# Patient Record
Sex: FEMALE | Race: WHITE | Employment: FULL TIME | URBAN - NONMETROPOLITAN AREA
[De-identification: names, ages, dates, MRNs, and addresses within clinical notes are randomized per-mention and may not be internally consistent; named-entity substitution may affect disease eponyms.]

---

## 2017-10-20 ENCOUNTER — HOSPITAL ENCOUNTER (OUTPATIENT)
Dept: NUCLEAR MEDICINE | Age: 27
Discharge: HOME | End: 2017-10-20
Payer: COMMERCIAL

## 2017-10-20 VITALS — WEIGHT: 80 LBS

## 2017-10-20 DIAGNOSIS — R10.811 RIGHT UPPER QUADRANT ABDOMINAL TENDERNESS, REBOUND TENDERNESS PRESENCE NOT SPECIFIED: ICD-10-CM

## 2017-10-20 PROCEDURE — A9537 TC99M MEBROFENIN: HCPCS | Performed by: INTERNAL MEDICINE

## 2017-10-20 PROCEDURE — 3430000000 HC RX DIAGNOSTIC RADIOPHARMACEUTICAL: Performed by: INTERNAL MEDICINE

## 2017-10-20 PROCEDURE — 78227 HEPATOBIL SYST IMAGE W/DRUG: CPT

## 2017-10-20 PROCEDURE — 6360000004 HC RX CONTRAST MEDICATION: Performed by: RADIOLOGY

## 2017-10-20 RX ADMIN — Medication 9 MILLICURIE: at 07:04

## 2017-10-20 RX ADMIN — SINCALIDE 0.73 MCG: 5 INJECTION, POWDER, LYOPHILIZED, FOR SOLUTION INTRAVENOUS at 08:00

## 2018-10-11 ENCOUNTER — ANESTHESIA (OUTPATIENT)
Dept: LABOR AND DELIVERY | Age: 28
End: 2018-10-11
Payer: COMMERCIAL

## 2018-10-11 ENCOUNTER — ANESTHESIA EVENT (OUTPATIENT)
Dept: LABOR AND DELIVERY | Age: 28
End: 2018-10-11
Payer: COMMERCIAL

## 2018-10-11 ENCOUNTER — HOSPITAL ENCOUNTER (INPATIENT)
Age: 28
LOS: 3 days | Discharge: HOME | End: 2018-10-14
Attending: OBSTETRICS & GYNECOLOGY | Admitting: OBSTETRICS & GYNECOLOGY
Payer: COMMERCIAL

## 2018-10-11 ENCOUNTER — APPOINTMENT (OUTPATIENT)
Dept: LABOR AND DELIVERY | Age: 28
End: 2018-10-11
Payer: COMMERCIAL

## 2018-10-11 LAB
ABO: NORMAL
AMPHETAMINE+METHAMPHETAMINE URINE SCREEN: NEGATIVE
ANTIBODY SCREEN: NORMAL
BARBITURATE QUANTITATIVE URINE: NEGATIVE
BASOPHILS # BLD: 0.2 %
BASOPHILS ABSOLUTE: 0 THOU/MM3 (ref 0–0.1)
BENZODIAZEPINE QUANTITATIVE URINE: NEGATIVE
CANNABINOID QUANTITATIVE URINE: NEGATIVE
COCAINE METABOLITE QUANTITATIVE URINE: NEGATIVE
EOSINOPHIL # BLD: 0.6 %
EOSINOPHILS ABSOLUTE: 0.1 THOU/MM3 (ref 0–0.4)
ERYTHROCYTE [DISTWIDTH] IN BLOOD BY AUTOMATED COUNT: 12.6 % (ref 11.5–14.5)
ERYTHROCYTE [DISTWIDTH] IN BLOOD BY AUTOMATED COUNT: 38.3 FL (ref 35–45)
HCT VFR BLD CALC: 33.7 % (ref 37–47)
HEMOGLOBIN: 11.2 GM/DL (ref 12–16)
IMMATURE GRANS (ABS): 0.09 THOU/MM3 (ref 0–0.07)
IMMATURE GRANULOCYTES: 0.8 %
LYMPHOCYTES # BLD: 20 %
LYMPHOCYTES ABSOLUTE: 2.3 THOU/MM3 (ref 1–4.8)
MCH RBC QN AUTO: 27.8 PG (ref 26–33)
MCHC RBC AUTO-ENTMCNC: 33.2 GM/DL (ref 32.2–35.5)
MCV RBC AUTO: 83.6 FL (ref 81–99)
MONOCYTES # BLD: 6.7 %
MONOCYTES ABSOLUTE: 0.8 THOU/MM3 (ref 0.4–1.3)
NUCLEATED RED BLOOD CELLS: 0 /100 WBC
OPIATES, URINE: NEGATIVE
OXYCODONE: NEGATIVE
PHENCYCLIDINE QUANTITATIVE URINE: NEGATIVE
PLATELET # BLD: 229 THOU/MM3 (ref 130–400)
PMV BLD AUTO: 10.1 FL (ref 9.4–12.4)
RBC # BLD: 4.03 MILL/MM3 (ref 4.2–5.4)
RH FACTOR: NORMAL
SEG NEUTROPHILS: 71.7 %
SEGMENTED NEUTROPHILS ABSOLUTE COUNT: 8.3 THOU/MM3 (ref 1.8–7.7)
WBC # BLD: 11.6 THOU/MM3 (ref 4.8–10.8)

## 2018-10-11 PROCEDURE — 1220000001 HC SEMI PRIVATE L&D R&B

## 2018-10-11 PROCEDURE — 6360000002 HC RX W HCPCS: Performed by: OBSTETRICS & GYNECOLOGY

## 2018-10-11 PROCEDURE — 86592 SYPHILIS TEST NON-TREP QUAL: CPT

## 2018-10-11 PROCEDURE — 85025 COMPLETE CBC W/AUTO DIFF WBC: CPT

## 2018-10-11 PROCEDURE — 86900 BLOOD TYPING SEROLOGIC ABO: CPT

## 2018-10-11 PROCEDURE — 2500000003 HC RX 250 WO HCPCS: Performed by: ANESTHESIOLOGY

## 2018-10-11 PROCEDURE — 3700000025 ANESTHESIA EPIDURAL BLOCK: Performed by: ANESTHESIOLOGY

## 2018-10-11 PROCEDURE — 36415 COLL VENOUS BLD VENIPUNCTURE: CPT

## 2018-10-11 PROCEDURE — 4A1HXCZ MONITORING OF PRODUCTS OF CONCEPTION, CARDIAC RATE, EXTERNAL APPROACH: ICD-10-PCS | Performed by: OBSTETRICS & GYNECOLOGY

## 2018-10-11 PROCEDURE — 6360000002 HC RX W HCPCS

## 2018-10-11 PROCEDURE — 86901 BLOOD TYPING SEROLOGIC RH(D): CPT

## 2018-10-11 PROCEDURE — 80307 DRUG TEST PRSMV CHEM ANLYZR: CPT

## 2018-10-11 PROCEDURE — 2709999900 HC NON-CHARGEABLE SUPPLY

## 2018-10-11 PROCEDURE — 2580000003 HC RX 258: Performed by: OBSTETRICS & GYNECOLOGY

## 2018-10-11 PROCEDURE — 86850 RBC ANTIBODY SCREEN: CPT

## 2018-10-11 RX ORDER — TERBUTALINE SULFATE 1 MG/ML
0.25 INJECTION, SOLUTION SUBCUTANEOUS ONCE
Status: COMPLETED | OUTPATIENT
Start: 2018-10-11 | End: 2018-10-12

## 2018-10-11 RX ORDER — SODIUM CHLORIDE, SODIUM LACTATE, POTASSIUM CHLORIDE, CALCIUM CHLORIDE 600; 310; 30; 20 MG/100ML; MG/100ML; MG/100ML; MG/100ML
INJECTION, SOLUTION INTRAVENOUS CONTINUOUS
Status: DISCONTINUED | OUTPATIENT
Start: 2018-10-11 | End: 2018-10-11 | Stop reason: SDUPTHER

## 2018-10-11 RX ORDER — DOCUSATE SODIUM 100 MG/1
100 CAPSULE, LIQUID FILLED ORAL 2 TIMES DAILY
Status: DISCONTINUED | OUTPATIENT
Start: 2018-10-11 | End: 2018-10-11 | Stop reason: SDUPTHER

## 2018-10-11 RX ORDER — BUTORPHANOL TARTRATE 1 MG/ML
INJECTION, SOLUTION INTRAMUSCULAR; INTRAVENOUS
Status: DISCONTINUED
Start: 2018-10-11 | End: 2018-10-12

## 2018-10-11 RX ORDER — DIPHENHYDRAMINE HYDROCHLORIDE 50 MG/ML
25 INJECTION INTRAMUSCULAR; INTRAVENOUS EVERY 4 HOURS PRN
Status: DISCONTINUED | OUTPATIENT
Start: 2018-10-11 | End: 2018-10-12

## 2018-10-11 RX ORDER — NALBUPHINE HCL 10 MG/ML
5 AMPUL (ML) INJECTION EVERY 4 HOURS PRN
Status: DISCONTINUED | OUTPATIENT
Start: 2018-10-11 | End: 2018-10-12 | Stop reason: HOSPADM

## 2018-10-11 RX ORDER — DIPHENHYDRAMINE HYDROCHLORIDE 50 MG/ML
25 INJECTION INTRAMUSCULAR; INTRAVENOUS EVERY 4 HOURS PRN
Status: DISCONTINUED | OUTPATIENT
Start: 2018-10-11 | End: 2018-10-11 | Stop reason: SDUPTHER

## 2018-10-11 RX ORDER — TERBUTALINE SULFATE 1 MG/ML
INJECTION, SOLUTION SUBCUTANEOUS
Status: DISCONTINUED
Start: 2018-10-11 | End: 2018-10-12

## 2018-10-11 RX ORDER — ACETAMINOPHEN 325 MG/1
650 TABLET ORAL EVERY 4 HOURS PRN
Status: DISCONTINUED | OUTPATIENT
Start: 2018-10-11 | End: 2018-10-11 | Stop reason: SDUPTHER

## 2018-10-11 RX ORDER — ACETAMINOPHEN 325 MG/1
650 TABLET ORAL EVERY 4 HOURS PRN
Status: DISCONTINUED | OUTPATIENT
Start: 2018-10-11 | End: 2018-10-12 | Stop reason: HOSPADM

## 2018-10-11 RX ORDER — SODIUM CHLORIDE, SODIUM LACTATE, POTASSIUM CHLORIDE, CALCIUM CHLORIDE 600; 310; 30; 20 MG/100ML; MG/100ML; MG/100ML; MG/100ML
INJECTION, SOLUTION INTRAVENOUS CONTINUOUS
Status: DISCONTINUED | OUTPATIENT
Start: 2018-10-11 | End: 2018-10-12

## 2018-10-11 RX ORDER — BUTORPHANOL TARTRATE 1 MG/ML
1 INJECTION, SOLUTION INTRAMUSCULAR; INTRAVENOUS
Status: DISCONTINUED | OUTPATIENT
Start: 2018-10-11 | End: 2018-10-12

## 2018-10-11 RX ORDER — ONDANSETRON 2 MG/ML
4 INJECTION INTRAMUSCULAR; INTRAVENOUS EVERY 6 HOURS PRN
Status: DISCONTINUED | OUTPATIENT
Start: 2018-10-11 | End: 2018-10-12 | Stop reason: HOSPADM

## 2018-10-11 RX ORDER — METHYLERGONOVINE MALEATE 0.2 MG/ML
200 INJECTION INTRAVENOUS PRN
Status: DISCONTINUED | OUTPATIENT
Start: 2018-10-11 | End: 2018-10-12

## 2018-10-11 RX ORDER — NALOXONE HYDROCHLORIDE 0.4 MG/ML
0.4 INJECTION, SOLUTION INTRAMUSCULAR; INTRAVENOUS; SUBCUTANEOUS PRN
Status: DISCONTINUED | OUTPATIENT
Start: 2018-10-11 | End: 2018-10-12 | Stop reason: HOSPADM

## 2018-10-11 RX ORDER — DOCUSATE SODIUM 100 MG/1
100 CAPSULE, LIQUID FILLED ORAL 2 TIMES DAILY
Status: DISCONTINUED | OUTPATIENT
Start: 2018-10-11 | End: 2018-10-12 | Stop reason: HOSPADM

## 2018-10-11 RX ORDER — ONDANSETRON 2 MG/ML
4 INJECTION INTRAMUSCULAR; INTRAVENOUS EVERY 6 HOURS PRN
Status: DISCONTINUED | OUTPATIENT
Start: 2018-10-11 | End: 2018-10-11 | Stop reason: SDUPTHER

## 2018-10-11 RX ORDER — METHYLERGONOVINE MALEATE 0.2 MG/ML
200 INJECTION INTRAVENOUS PRN
Status: DISCONTINUED | OUTPATIENT
Start: 2018-10-11 | End: 2018-10-11 | Stop reason: SDUPTHER

## 2018-10-11 RX ORDER — CARBOPROST TROMETHAMINE 250 UG/ML
250 INJECTION, SOLUTION INTRAMUSCULAR PRN
Status: DISCONTINUED | OUTPATIENT
Start: 2018-10-11 | End: 2018-10-11 | Stop reason: SDUPTHER

## 2018-10-11 RX ORDER — CARBOPROST TROMETHAMINE 250 UG/ML
250 INJECTION, SOLUTION INTRAMUSCULAR PRN
Status: DISCONTINUED | OUTPATIENT
Start: 2018-10-11 | End: 2018-10-12

## 2018-10-11 RX ADMIN — SODIUM CHLORIDE, POTASSIUM CHLORIDE, SODIUM LACTATE AND CALCIUM CHLORIDE: 600; 310; 30; 20 INJECTION, SOLUTION INTRAVENOUS at 18:23

## 2018-10-11 RX ADMIN — SODIUM CHLORIDE, POTASSIUM CHLORIDE, SODIUM LACTATE AND CALCIUM CHLORIDE: 600; 310; 30; 20 INJECTION, SOLUTION INTRAVENOUS at 21:10

## 2018-10-11 RX ADMIN — Medication 15 ML/HR: at 22:15

## 2018-10-11 RX ADMIN — TERBUTALINE SULFATE 0.25 MG: 1 INJECTION SUBCUTANEOUS at 19:01

## 2018-10-11 RX ADMIN — BUTORPHANOL TARTRATE 1 MG: 1 INJECTION, SOLUTION INTRAMUSCULAR; INTRAVENOUS at 18:24

## 2018-10-11 RX ADMIN — SODIUM CHLORIDE, POTASSIUM CHLORIDE, SODIUM LACTATE AND CALCIUM CHLORIDE: 600; 310; 30; 20 INJECTION, SOLUTION INTRAVENOUS at 16:24

## 2018-10-11 ASSESSMENT — PAIN SCALES - GENERAL: PAINLEVEL_OUTOF10: 7

## 2018-10-11 NOTE — FLOWSHEET NOTE
Dr anthony Robles phoned in to dept.  Is viewing strip and has ordered 0.25 mg of brethine subcue now and hold off on the pitocin until further orders

## 2018-10-11 NOTE — PLAN OF CARE
Problem: Anxiety:  Goal: Level of anxiety will decrease  Level of anxiety will decrease  Outcome: Ongoing  Pt anxiety level will remain low with support from staff and     Problem: Breathing Pattern - Ineffective:  Goal: Able to breathe comfortably  Able to breathe comfortably  Outcome: Ongoing  Pt will continue to have easy and unlabored respirs    Problem: Fluid Volume - Imbalance:  Goal: Absence of intrapartum hemorrhage signs and symptoms  Absence of intrapartum hemorrhage signs and symptoms  Outcome: Ongoing  Pt will continue to have absence of intrapartum heorrhage s/s    Problem: Infection - Intrapartum Infection:  Goal: Will show no infection signs and symptoms  Will show no infection signs and symptoms  Outcome: Ongoing  Pt will continue to remain afebrile    Problem: Labor Process - Prolonged:  Goal: Uterine contractions within specified parameters  Uterine contractions within specified parameters  Outcome: Ongoing  Pt will continue to have uterine ctx within normal limits    Problem:  Screening:  Goal: Ability to make informed decisions regarding treatment has improved  Ability to make informed decisions regarding treatment has improved  Outcome: Ongoing  Pt will continue to have the ability to make informed decisions regarding her tx    Problem: Pain - Acute:  Goal: Able to cope with pain  Able to cope with pain  Outcome: Ongoing  Pt is planning on an epidural for labor discomfort    Problem: Tissue Perfusion - Uteroplacental, Altered:  Goal: Absence of abnormal fetal heart rate pattern  Absence of abnormal fetal heart rate pattern  Outcome: Ongoing  FHT's will continue with mod variability and spontaneous accels    Problem: Urinary Retention:  Goal: Experiences of bladder distention will decrease  Experiences of bladder distention will decrease  Outcome: Ongoing  Pt willcontinue to void qs  Goal: Urinary elimination within specified parameters  Urinary elimination within specified

## 2018-10-12 VITALS
DIASTOLIC BLOOD PRESSURE: 59 MMHG | RESPIRATION RATE: 1 BRPM | OXYGEN SATURATION: 99 % | SYSTOLIC BLOOD PRESSURE: 106 MMHG | TEMPERATURE: 96.8 F

## 2018-10-12 LAB — RPR: NONREACTIVE

## 2018-10-12 PROCEDURE — 3609079900 HC CESAREAN SECTION: Performed by: OBSTETRICS & GYNECOLOGY

## 2018-10-12 PROCEDURE — 2580000003 HC RX 258: Performed by: OBSTETRICS & GYNECOLOGY

## 2018-10-12 PROCEDURE — 6360000002 HC RX W HCPCS: Performed by: ANESTHESIOLOGY

## 2018-10-12 PROCEDURE — 2500000003 HC RX 250 WO HCPCS: Performed by: ANESTHESIOLOGY

## 2018-10-12 PROCEDURE — 7100000000 HC PACU RECOVERY - FIRST 15 MIN: Performed by: OBSTETRICS & GYNECOLOGY

## 2018-10-12 PROCEDURE — 2500000003 HC RX 250 WO HCPCS

## 2018-10-12 PROCEDURE — 3E033VJ INTRODUCTION OF OTHER HORMONE INTO PERIPHERAL VEIN, PERCUTANEOUS APPROACH: ICD-10-PCS | Performed by: OBSTETRICS & GYNECOLOGY

## 2018-10-12 PROCEDURE — 2709999900 HC NON-CHARGEABLE SUPPLY

## 2018-10-12 PROCEDURE — 2580000003 HC RX 258: Performed by: ANESTHESIOLOGY

## 2018-10-12 PROCEDURE — 10907ZC DRAINAGE OF AMNIOTIC FLUID, THERAPEUTIC FROM PRODUCTS OF CONCEPTION, VIA NATURAL OR ARTIFICIAL OPENING: ICD-10-PCS | Performed by: OBSTETRICS & GYNECOLOGY

## 2018-10-12 PROCEDURE — 7100000001 HC PACU RECOVERY - ADDTL 15 MIN: Performed by: OBSTETRICS & GYNECOLOGY

## 2018-10-12 PROCEDURE — 6360000002 HC RX W HCPCS: Performed by: OBSTETRICS & GYNECOLOGY

## 2018-10-12 PROCEDURE — 6370000000 HC RX 637 (ALT 250 FOR IP): Performed by: OBSTETRICS & GYNECOLOGY

## 2018-10-12 PROCEDURE — 2580000003 HC RX 258

## 2018-10-12 PROCEDURE — S0028 INJECTION, FAMOTIDINE, 20 MG: HCPCS

## 2018-10-12 PROCEDURE — 2500000003 HC RX 250 WO HCPCS: Performed by: OBSTETRICS & GYNECOLOGY

## 2018-10-12 PROCEDURE — 6360000002 HC RX W HCPCS

## 2018-10-12 PROCEDURE — 1200000000 HC SEMI PRIVATE

## 2018-10-12 RX ORDER — DOCUSATE SODIUM 100 MG/1
100 CAPSULE, LIQUID FILLED ORAL 2 TIMES DAILY
Status: DISCONTINUED | OUTPATIENT
Start: 2018-10-12 | End: 2018-10-14 | Stop reason: HOSPADM

## 2018-10-12 RX ORDER — ZOLPIDEM TARTRATE 10 MG/1
10 TABLET ORAL NIGHTLY PRN
Status: DISCONTINUED | OUTPATIENT
Start: 2018-10-13 | End: 2018-10-14 | Stop reason: HOSPADM

## 2018-10-12 RX ORDER — MISOPROSTOL 200 UG/1
800 TABLET ORAL PRN
Status: DISCONTINUED | OUTPATIENT
Start: 2018-10-12 | End: 2018-10-14 | Stop reason: HOSPADM

## 2018-10-12 RX ORDER — NALOXONE HYDROCHLORIDE 0.4 MG/ML
0.4 INJECTION, SOLUTION INTRAMUSCULAR; INTRAVENOUS; SUBCUTANEOUS PRN
Status: ACTIVE | OUTPATIENT
Start: 2018-10-12 | End: 2018-10-13

## 2018-10-12 RX ORDER — CLINDAMYCIN PHOSPHATE 900 MG/50ML
900 INJECTION INTRAVENOUS
Status: COMPLETED | OUTPATIENT
Start: 2018-10-12 | End: 2018-10-12

## 2018-10-12 RX ORDER — CLINDAMYCIN PHOSPHATE 600 MG/50ML
600 INJECTION INTRAVENOUS EVERY 8 HOURS
Status: DISCONTINUED | OUTPATIENT
Start: 2018-10-12 | End: 2018-10-13

## 2018-10-12 RX ORDER — DIPHENHYDRAMINE HYDROCHLORIDE 50 MG/ML
25 INJECTION INTRAMUSCULAR; INTRAVENOUS EVERY 6 HOURS PRN
Status: DISCONTINUED | OUTPATIENT
Start: 2018-10-13 | End: 2018-10-14 | Stop reason: HOSPADM

## 2018-10-12 RX ORDER — OXYCODONE HYDROCHLORIDE 5 MG/1
5 TABLET ORAL EVERY 6 HOURS PRN
Qty: 28 TABLET | Refills: 0 | Status: SHIPPED | OUTPATIENT
Start: 2018-10-12 | End: 2018-10-19

## 2018-10-12 RX ORDER — BISACODYL 10 MG
10 SUPPOSITORY, RECTAL RECTAL DAILY PRN
Status: DISCONTINUED | OUTPATIENT
Start: 2018-10-12 | End: 2018-10-14 | Stop reason: HOSPADM

## 2018-10-12 RX ORDER — KETOROLAC TROMETHAMINE 30 MG/ML
INJECTION, SOLUTION INTRAMUSCULAR; INTRAVENOUS PRN
Status: DISCONTINUED | OUTPATIENT
Start: 2018-10-12 | End: 2018-10-12 | Stop reason: SDUPTHER

## 2018-10-12 RX ORDER — OXYCODONE HYDROCHLORIDE AND ACETAMINOPHEN 5; 325 MG/1; MG/1
1 TABLET ORAL EVERY 4 HOURS PRN
Status: DISCONTINUED | OUTPATIENT
Start: 2018-10-13 | End: 2018-10-14 | Stop reason: HOSPADM

## 2018-10-12 RX ORDER — OXYCODONE HYDROCHLORIDE 5 MG/1
5 TABLET ORAL EVERY 4 HOURS PRN
Status: DISPENSED | OUTPATIENT
Start: 2018-10-12 | End: 2018-10-13

## 2018-10-12 RX ORDER — ONDANSETRON 2 MG/ML
4 INJECTION INTRAMUSCULAR; INTRAVENOUS EVERY 6 HOURS PRN
Status: ACTIVE | OUTPATIENT
Start: 2018-10-12 | End: 2018-10-13

## 2018-10-12 RX ORDER — ACETAMINOPHEN 325 MG/1
650 TABLET ORAL EVERY 4 HOURS PRN
Status: DISCONTINUED | OUTPATIENT
Start: 2018-10-13 | End: 2018-10-14 | Stop reason: HOSPADM

## 2018-10-12 RX ORDER — MORPHINE SULFATE 10 MG/ML
INJECTION, SOLUTION INTRAMUSCULAR; INTRAVENOUS PRN
Status: DISCONTINUED | OUTPATIENT
Start: 2018-10-12 | End: 2018-10-12 | Stop reason: SDUPTHER

## 2018-10-12 RX ORDER — TRISODIUM CITRATE DIHYDRATE AND CITRIC ACID MONOHYDRATE 500; 334 MG/5ML; MG/5ML
15 SOLUTION ORAL ONCE
Status: DISCONTINUED | OUTPATIENT
Start: 2018-10-12 | End: 2018-10-12 | Stop reason: HOSPADM

## 2018-10-12 RX ORDER — CLINDAMYCIN PHOSPHATE 900 MG/50ML
INJECTION INTRAVENOUS
Status: COMPLETED
Start: 2018-10-12 | End: 2018-10-12

## 2018-10-12 RX ORDER — KETOROLAC TROMETHAMINE 30 MG/ML
15 INJECTION, SOLUTION INTRAMUSCULAR; INTRAVENOUS EVERY 6 HOURS
Status: COMPLETED | OUTPATIENT
Start: 2018-10-12 | End: 2018-10-13

## 2018-10-12 RX ORDER — DIPHENHYDRAMINE HCL 25 MG
25 TABLET ORAL EVERY 4 HOURS PRN
Status: DISCONTINUED | OUTPATIENT
Start: 2018-10-13 | End: 2018-10-14 | Stop reason: HOSPADM

## 2018-10-12 RX ORDER — PRENATAL WITH FERROUS FUM AND FOLIC ACID 3080; 920; 120; 400; 22; 1.84; 3; 20; 10; 1; 12; 200; 27; 25; 2 [IU]/1; [IU]/1; MG/1; [IU]/1; MG/1; MG/1; MG/1; MG/1; MG/1; MG/1; UG/1; MG/1; MG/1; MG/1; MG/1
1 TABLET ORAL DAILY
Status: DISCONTINUED | OUTPATIENT
Start: 2018-10-12 | End: 2018-10-14 | Stop reason: HOSPADM

## 2018-10-12 RX ORDER — ONDANSETRON 2 MG/ML
4 INJECTION INTRAMUSCULAR; INTRAVENOUS EVERY 6 HOURS PRN
Status: DISCONTINUED | OUTPATIENT
Start: 2018-10-12 | End: 2018-10-12 | Stop reason: HOSPADM

## 2018-10-12 RX ORDER — ONDANSETRON 4 MG/1
8 TABLET, FILM COATED ORAL EVERY 8 HOURS PRN
Status: DISCONTINUED | OUTPATIENT
Start: 2018-10-13 | End: 2018-10-14 | Stop reason: HOSPADM

## 2018-10-12 RX ORDER — ACETAMINOPHEN 325 MG/1
325 TABLET ORAL EVERY 4 HOURS PRN
Status: ACTIVE | OUTPATIENT
Start: 2018-10-12 | End: 2018-10-13

## 2018-10-12 RX ORDER — KETOROLAC TROMETHAMINE 30 MG/ML
30 INJECTION, SOLUTION INTRAMUSCULAR; INTRAVENOUS EVERY 6 HOURS
Status: DISPENSED | OUTPATIENT
Start: 2018-10-13 | End: 2018-10-14

## 2018-10-12 RX ORDER — DEXTROSE MONOHYDRATE 50 MG/ML
INJECTION, SOLUTION INTRAVENOUS
Status: DISCONTINUED
Start: 2018-10-12 | End: 2018-10-12

## 2018-10-12 RX ORDER — MORPHINE SULFATE 4 MG/ML
4 INJECTION, SOLUTION INTRAMUSCULAR; INTRAVENOUS
Status: DISCONTINUED | OUTPATIENT
Start: 2018-10-13 | End: 2018-10-14 | Stop reason: HOSPADM

## 2018-10-12 RX ORDER — FENTANYL CITRATE 50 UG/ML
INJECTION, SOLUTION INTRAMUSCULAR; INTRAVENOUS PRN
Status: DISCONTINUED | OUTPATIENT
Start: 2018-10-12 | End: 2018-10-12 | Stop reason: SDUPTHER

## 2018-10-12 RX ORDER — OXYCODONE HYDROCHLORIDE 5 MG/1
10 TABLET ORAL EVERY 4 HOURS PRN
Status: ACTIVE | OUTPATIENT
Start: 2018-10-12 | End: 2018-10-13

## 2018-10-12 RX ORDER — SODIUM CHLORIDE 0.9 % (FLUSH) 0.9 %
10 SYRINGE (ML) INJECTION PRN
Status: DISCONTINUED | OUTPATIENT
Start: 2018-10-12 | End: 2018-10-14 | Stop reason: HOSPADM

## 2018-10-12 RX ORDER — SODIUM CHLORIDE, SODIUM LACTATE, POTASSIUM CHLORIDE, CALCIUM CHLORIDE 600; 310; 30; 20 MG/100ML; MG/100ML; MG/100ML; MG/100ML
INJECTION, SOLUTION INTRAVENOUS CONTINUOUS PRN
Status: DISCONTINUED | OUTPATIENT
Start: 2018-10-12 | End: 2018-10-12 | Stop reason: SDUPTHER

## 2018-10-12 RX ORDER — SODIUM CHLORIDE, SODIUM LACTATE, POTASSIUM CHLORIDE, AND CALCIUM CHLORIDE .6; .31; .03; .02 G/100ML; G/100ML; G/100ML; G/100ML
1000 INJECTION, SOLUTION INTRAVENOUS ONCE
Status: DISCONTINUED | OUTPATIENT
Start: 2018-10-12 | End: 2018-10-12 | Stop reason: HOSPADM

## 2018-10-12 RX ORDER — FERROUS SULFATE 325(65) MG
325 TABLET ORAL
Status: DISCONTINUED | OUTPATIENT
Start: 2018-10-13 | End: 2018-10-14 | Stop reason: HOSPADM

## 2018-10-12 RX ORDER — TERBUTALINE SULFATE 1 MG/ML
INJECTION, SOLUTION SUBCUTANEOUS
Status: COMPLETED
Start: 2018-10-12 | End: 2018-10-12

## 2018-10-12 RX ORDER — DIPHENHYDRAMINE HYDROCHLORIDE 50 MG/ML
25 INJECTION INTRAMUSCULAR; INTRAVENOUS EVERY 6 HOURS PRN
Status: ACTIVE | OUTPATIENT
Start: 2018-10-12 | End: 2018-10-13

## 2018-10-12 RX ORDER — SIMETHICONE 80 MG
80 TABLET,CHEWABLE ORAL EVERY 6 HOURS PRN
Status: DISCONTINUED | OUTPATIENT
Start: 2018-10-12 | End: 2018-10-14 | Stop reason: HOSPADM

## 2018-10-12 RX ORDER — TRISODIUM CITRATE DIHYDRATE AND CITRIC ACID MONOHYDRATE 500; 334 MG/5ML; MG/5ML
SOLUTION ORAL
Status: DISCONTINUED
Start: 2018-10-12 | End: 2018-10-12 | Stop reason: WASHOUT

## 2018-10-12 RX ORDER — MORPHINE SULFATE 2 MG/ML
2 INJECTION, SOLUTION INTRAMUSCULAR; INTRAVENOUS
Status: DISCONTINUED | OUTPATIENT
Start: 2018-10-13 | End: 2018-10-14 | Stop reason: HOSPADM

## 2018-10-12 RX ORDER — LANOLIN 100 %
OINTMENT (GRAM) TOPICAL
Status: DISCONTINUED | OUTPATIENT
Start: 2018-10-12 | End: 2018-10-14 | Stop reason: HOSPADM

## 2018-10-12 RX ORDER — SODIUM CHLORIDE 0.9 % (FLUSH) 0.9 %
10 SYRINGE (ML) INJECTION EVERY 12 HOURS SCHEDULED
Status: DISCONTINUED | OUTPATIENT
Start: 2018-10-12 | End: 2018-10-14 | Stop reason: HOSPADM

## 2018-10-12 RX ORDER — LIDOCAINE HYDROCHLORIDE 20 MG/ML
INJECTION, SOLUTION INFILTRATION; PERINEURAL PRN
Status: DISCONTINUED | OUTPATIENT
Start: 2018-10-12 | End: 2018-10-12 | Stop reason: SDUPTHER

## 2018-10-12 RX ORDER — TERBUTALINE SULFATE 1 MG/ML
0.25 INJECTION, SOLUTION SUBCUTANEOUS ONCE
Status: DISCONTINUED | OUTPATIENT
Start: 2018-10-12 | End: 2018-10-12

## 2018-10-12 RX ORDER — ONDANSETRON 2 MG/ML
4 INJECTION INTRAMUSCULAR; INTRAVENOUS EVERY 6 HOURS PRN
Status: DISCONTINUED | OUTPATIENT
Start: 2018-10-13 | End: 2018-10-14 | Stop reason: HOSPADM

## 2018-10-12 RX ORDER — OXYTOCIN 10 [USP'U]/ML
INJECTION, SOLUTION INTRAMUSCULAR; INTRAVENOUS PRN
Status: DISCONTINUED | OUTPATIENT
Start: 2018-10-12 | End: 2018-10-12 | Stop reason: SDUPTHER

## 2018-10-12 RX ORDER — CARBOPROST TROMETHAMINE 250 UG/ML
250 INJECTION, SOLUTION INTRAMUSCULAR PRN
Status: DISCONTINUED | OUTPATIENT
Start: 2018-10-12 | End: 2018-10-14 | Stop reason: HOSPADM

## 2018-10-12 RX ORDER — AZITHROMYCIN 500 MG/1
INJECTION, POWDER, LYOPHILIZED, FOR SOLUTION INTRAVENOUS
Status: DISCONTINUED
Start: 2018-10-12 | End: 2018-10-12

## 2018-10-12 RX ORDER — OXYCODONE HYDROCHLORIDE AND ACETAMINOPHEN 5; 325 MG/1; MG/1
2 TABLET ORAL EVERY 4 HOURS PRN
Status: DISCONTINUED | OUTPATIENT
Start: 2018-10-13 | End: 2018-10-14 | Stop reason: HOSPADM

## 2018-10-12 RX ORDER — METHYLERGONOVINE MALEATE 0.2 MG/ML
200 INJECTION INTRAVENOUS PRN
Status: DISCONTINUED | OUTPATIENT
Start: 2018-10-12 | End: 2018-10-14 | Stop reason: HOSPADM

## 2018-10-12 RX ORDER — IBUPROFEN 800 MG/1
800 TABLET ORAL EVERY 8 HOURS
Status: DISCONTINUED | OUTPATIENT
Start: 2018-10-14 | End: 2018-10-13

## 2018-10-12 RX ORDER — SODIUM CHLORIDE, SODIUM LACTATE, POTASSIUM CHLORIDE, CALCIUM CHLORIDE 600; 310; 30; 20 MG/100ML; MG/100ML; MG/100ML; MG/100ML
INJECTION, SOLUTION INTRAVENOUS CONTINUOUS
Status: DISCONTINUED | OUTPATIENT
Start: 2018-10-12 | End: 2018-10-14 | Stop reason: HOSPADM

## 2018-10-12 RX ADMIN — SODIUM CHLORIDE, POTASSIUM CHLORIDE, SODIUM LACTATE AND CALCIUM CHLORIDE: 600; 310; 30; 20 INJECTION, SOLUTION INTRAVENOUS at 02:55

## 2018-10-12 RX ADMIN — CLINDAMYCIN PHOSPHATE 600 MG: 600 INJECTION, SOLUTION INTRAVENOUS at 14:03

## 2018-10-12 RX ADMIN — ACETAMINOPHEN 650 MG: 325 TABLET ORAL at 01:07

## 2018-10-12 RX ADMIN — LIDOCAINE HYDROCHLORIDE 20 ML: 20 INJECTION, SOLUTION INFILTRATION; PERINEURAL at 05:40

## 2018-10-12 RX ADMIN — Medication 1 MILLI-UNITS/MIN: at 03:13

## 2018-10-12 RX ADMIN — FENTANYL CITRATE 100 MCG: 50 INJECTION INTRAMUSCULAR; INTRAVENOUS at 05:40

## 2018-10-12 RX ADMIN — KETOROLAC TROMETHAMINE 15 MG: 30 INJECTION, SOLUTION INTRAMUSCULAR at 19:15

## 2018-10-12 RX ADMIN — Medication 50 MILLI-UNITS/MIN: at 06:54

## 2018-10-12 RX ADMIN — AZITHROMYCIN MONOHYDRATE 500 MG: 500 INJECTION, POWDER, LYOPHILIZED, FOR SOLUTION INTRAVENOUS at 05:57

## 2018-10-12 RX ADMIN — CLINDAMYCIN PHOSPHATE 900 MG: 900 INJECTION, SOLUTION INTRAVENOUS at 05:27

## 2018-10-12 RX ADMIN — PRENATAL WITH FERROUS FUM AND FOLIC ACID 1 TABLET: 3080; 920; 120; 400; 22; 1.84; 3; 20; 10; 1; 12; 200; 27; 25; 2 TABLET ORAL at 14:43

## 2018-10-12 RX ADMIN — CLINDAMYCIN PHOSPHATE 900 MG: 900 INJECTION INTRAVENOUS at 05:27

## 2018-10-12 RX ADMIN — GENTAMICIN SULFATE 267.6 MG: 40 INJECTION, SOLUTION INTRAMUSCULAR; INTRAVENOUS at 06:08

## 2018-10-12 RX ADMIN — SODIUM CHLORIDE, POTASSIUM CHLORIDE, SODIUM LACTATE AND CALCIUM CHLORIDE: 600; 310; 30; 20 INJECTION, SOLUTION INTRAVENOUS at 15:06

## 2018-10-12 RX ADMIN — OXYTOCIN 10 UNITS: 10 INJECTION, SOLUTION INTRAMUSCULAR; INTRAVENOUS at 06:05

## 2018-10-12 RX ADMIN — DOCUSATE SODIUM 100 MG: 100 CAPSULE, LIQUID FILLED ORAL at 20:55

## 2018-10-12 RX ADMIN — SODIUM CHLORIDE, POTASSIUM CHLORIDE, SODIUM LACTATE AND CALCIUM CHLORIDE: 600; 310; 30; 20 INJECTION, SOLUTION INTRAVENOUS at 05:38

## 2018-10-12 RX ADMIN — KETOROLAC TROMETHAMINE 30 MG: 60 INJECTION, SOLUTION INTRAMUSCULAR at 06:38

## 2018-10-12 RX ADMIN — TERBUTALINE SULFATE 0.25 MG: 1 INJECTION SUBCUTANEOUS at 00:33

## 2018-10-12 RX ADMIN — FAMOTIDINE 20 MG: 10 INJECTION, SOLUTION INTRAVENOUS at 05:28

## 2018-10-12 RX ADMIN — SODIUM CHLORIDE, POTASSIUM CHLORIDE, SODIUM LACTATE AND CALCIUM CHLORIDE: 600; 310; 30; 20 INJECTION, SOLUTION INTRAVENOUS at 22:00

## 2018-10-12 RX ADMIN — KETOROLAC TROMETHAMINE 30 MG: 30 INJECTION, SOLUTION INTRAMUSCULAR; INTRAVENOUS at 06:36

## 2018-10-12 RX ADMIN — OXYTOCIN 20 UNITS: 10 INJECTION, SOLUTION INTRAMUSCULAR; INTRAVENOUS at 06:35

## 2018-10-12 RX ADMIN — CLINDAMYCIN PHOSPHATE 600 MG: 600 INJECTION, SOLUTION INTRAVENOUS at 20:55

## 2018-10-12 RX ADMIN — MORPHINE SULFATE 2 MG: 10 INJECTION, SOLUTION INTRAMUSCULAR; INTRAVENOUS at 05:40

## 2018-10-12 RX ADMIN — SODIUM BICARBONATE 2 MEQ: 84 INJECTION, SOLUTION INTRAVENOUS at 05:40

## 2018-10-12 RX ADMIN — Medication 80 MG: at 20:55

## 2018-10-12 RX ADMIN — DOCUSATE SODIUM 100 MG: 100 CAPSULE, LIQUID FILLED ORAL at 14:43

## 2018-10-12 RX ADMIN — KETOROLAC TROMETHAMINE 15 MG: 30 INJECTION, SOLUTION INTRAMUSCULAR at 13:07

## 2018-10-12 ASSESSMENT — PULMONARY FUNCTION TESTS
PIF_VALUE: 0
PIF_VALUE: 1
PIF_VALUE: 0

## 2018-10-12 ASSESSMENT — PAIN SCALES - GENERAL
PAINLEVEL_OUTOF10: 5
PAINLEVEL_OUTOF10: 3
PAINLEVEL_OUTOF10: 0
PAINLEVEL_OUTOF10: 3

## 2018-10-12 NOTE — PLAN OF CARE
Problem: Fluid Volume - Imbalance:  Goal: Absence of postpartum hemorrhage signs and symptoms  Absence of postpartum hemorrhage signs and symptoms   Outcome: Ongoing  Vaginal bleeding WNL, no clots or foul odors. Problem: Pain - Acute:  Goal: Pain level will decrease  Pain level will decrease   Outcome: Ongoing  Scheduled toradol given with relief. Problem: Urinary Retention:  Goal: Urinary elimination within specified parameters  Urinary elimination within specified parameters   Outcome: Ongoing  Nieto catheter draining clear yellow urine. Problem: Discharge Planning:  Goal: Discharged to appropriate level of care  Discharged to appropriate level of care   Outcome: Ongoing  Plan to be discharged home with significant other     Problem: Infection - Surgical Site:  Goal: Will show no infection signs and symptoms  Will show no infection signs and symptoms   Outcome: Ongoing  Afebrile this shift. Problem: Mood - Altered:  Goal: Mood stable  Mood stable   Outcome: Ongoing  Emotional support provided. Problem: Nausea/Vomiting:  Goal: Absence of nausea/vomiting  Absence of nausea/vomiting   Outcome: Ongoing  No nausea noted. Problem: Venous Thromboembolism:  Goal: Will show no signs or symptoms of venous thromboembolism  Will show no signs or symptoms of venous thromboembolism   Outcome: Ongoing  Afebrile this shift. Comments: Care plan reviewed with patient and she contributes to goal setting and voices understanding of plan of care.

## 2018-10-12 NOTE — FLOWSHEET NOTE
Updated Dr. Elvis Johnson on patient status. Baseline remains at 140 with moderate variability and accelerations. Patient is having mild ctx every 6-9 min. SVE at 0130 was 5,80%-1. RN will start pitocin and continue with poc.

## 2018-10-12 NOTE — FLOWSHEET NOTE
Dr. Nabeel Bills notified of fetal heart tones and patient ctx. Patient is ctx every min. RN repositioned patient and when the patient was repositioned the heart tones dropped and the patient had a prolonged deceleration. The heart tones are trying to come back up to baseline. Interventions have been performed. RN would like Dr. Nabeel Bills to come to the unit. She is on her way and orders received.

## 2018-10-12 NOTE — L&D DELIVERY SUMMARY NOTE
Department of Obstetrics and Gynecology   Section Note    Pt Name: Sejal Woodson  MRN: [id number] [place] #: [de-identified]  YOB: 1990  Procedure Performed By: Hao Hernandez MD    Indications: non-reassuring fetal status fetal bradycardia x 3 and no progress    Pre-operative Diagnosis: 40 week pregnancy. Post-operative Diagnosis:  Same, Delivered, Living newbornMale  Procedure:  primary low transverse  section  Findings:  Normal tubes, ovaries and uterus. Baby Male, Apgars  8,9 and weight of 8 lbs and 2 ounces. Estimated Blood Loss:  700ml         Specimens: None     Complications:  CAN x 1         Condition: infant stable to general nursery and mother stable    Indications:     Sejal Woodson is a 34 y.o. female  at 44w3d who presented for   section for  failure to progress - arrest of dilation and non-reassuring fetal status. She understood the  risks and benefits and signed informed consent. Procedure: The patient was taken to the Operating Room where spinal anesthesia was placed. She was placed in the dorsal supine position with a leftward tilt and prepped and draped. A Pfannenstiel incision was made with the scalpel taken down to the fascia. The fascia was nicked in the midline. This incision extended laterally. The underlying rectus muscle dissected bluntly and with the Mitchell scissors. The peritoneum identified and entered sharply. This incision extended superiorly and inferior with good visualization of the bladder. The vesicouterine peritoneum was identified and entered sharply. This incision extended laterally and the bladder flap created digitally. The uterus was incised in a low transverse fashion and extended digitally. The vertex was removed from the uterus with the aid of vacuum assistance and fundal pressure. The nose and mouth were suctioned. The rest of the baby delivered. The cord was clamped and cut and the baby was stimulated.

## 2018-10-12 NOTE — FLOWSHEET NOTE
Patient dangled on side of bed and stood at bedside. Returned to bed ice pack to incision area. Tolerated well, call light in reach denies any needs at this time.

## 2018-10-12 NOTE — FLOWSHEET NOTE
Dr. Constantino Hopkins notified that baby is not having any accelerations. She did receive stadol at [phone]. RN will acoustic stim and check her cervix and do scalp stim.

## 2018-10-13 LAB — HEMOGLOBIN: 9.3 GM/DL (ref 12–16)

## 2018-10-13 PROCEDURE — 6370000000 HC RX 637 (ALT 250 FOR IP): Performed by: ANESTHESIOLOGY

## 2018-10-13 PROCEDURE — 2500000003 HC RX 250 WO HCPCS: Performed by: OBSTETRICS & GYNECOLOGY

## 2018-10-13 PROCEDURE — 6360000002 HC RX W HCPCS: Performed by: ANESTHESIOLOGY

## 2018-10-13 PROCEDURE — 85018 HEMOGLOBIN: CPT

## 2018-10-13 PROCEDURE — 6370000000 HC RX 637 (ALT 250 FOR IP): Performed by: OBSTETRICS & GYNECOLOGY

## 2018-10-13 PROCEDURE — 1200000000 HC SEMI PRIVATE

## 2018-10-13 PROCEDURE — 6360000002 HC RX W HCPCS: Performed by: OBSTETRICS & GYNECOLOGY

## 2018-10-13 PROCEDURE — 2709999900 HC NON-CHARGEABLE SUPPLY

## 2018-10-13 PROCEDURE — 36415 COLL VENOUS BLD VENIPUNCTURE: CPT

## 2018-10-13 RX ADMIN — KETOROLAC TROMETHAMINE 30 MG: 30 INJECTION, SOLUTION INTRAMUSCULAR at 08:10

## 2018-10-13 RX ADMIN — CLINDAMYCIN PHOSPHATE 600 MG: 600 INJECTION, SOLUTION INTRAVENOUS at 04:35

## 2018-10-13 RX ADMIN — OXYCODONE HYDROCHLORIDE AND ACETAMINOPHEN 1 TABLET: 5; 325 TABLET ORAL at 23:47

## 2018-10-13 RX ADMIN — OXYCODONE HYDROCHLORIDE 5 MG: 5 TABLET ORAL at 04:30

## 2018-10-13 RX ADMIN — OXYCODONE HYDROCHLORIDE AND ACETAMINOPHEN 1 TABLET: 5; 325 TABLET ORAL at 13:26

## 2018-10-13 RX ADMIN — IBUPROFEN 800 MG: 200 SUSPENSION ORAL at 15:18

## 2018-10-13 RX ADMIN — DOCUSATE SODIUM 100 MG: 100 CAPSULE, LIQUID FILLED ORAL at 19:43

## 2018-10-13 RX ADMIN — KETOROLAC TROMETHAMINE 15 MG: 30 INJECTION, SOLUTION INTRAMUSCULAR at 01:05

## 2018-10-13 RX ADMIN — DOCUSATE SODIUM 100 MG: 100 CAPSULE, LIQUID FILLED ORAL at 08:10

## 2018-10-13 ASSESSMENT — PAIN SCALES - GENERAL
PAINLEVEL_OUTOF10: 1
PAINLEVEL_OUTOF10: 5
PAINLEVEL_OUTOF10: 2
PAINLEVEL_OUTOF10: 4
PAINLEVEL_OUTOF10: 3
PAINLEVEL_OUTOF10: 5

## 2018-10-14 VITALS
TEMPERATURE: 97.8 F | HEIGHT: 59 IN | BODY MASS INDEX: 23.79 KG/M2 | HEART RATE: 99 BPM | SYSTOLIC BLOOD PRESSURE: 113 MMHG | WEIGHT: 118 LBS | DIASTOLIC BLOOD PRESSURE: 73 MMHG | OXYGEN SATURATION: 100 % | RESPIRATION RATE: 18 BRPM

## 2018-10-14 LAB
BASOPHILS # BLD: 0.1 %
BASOPHILS ABSOLUTE: 0 THOU/MM3 (ref 0–0.1)
EOSINOPHIL # BLD: 1.4 %
EOSINOPHILS ABSOLUTE: 0.1 THOU/MM3 (ref 0–0.4)
ERYTHROCYTE [DISTWIDTH] IN BLOOD BY AUTOMATED COUNT: 13.2 % (ref 11.5–14.5)
ERYTHROCYTE [DISTWIDTH] IN BLOOD BY AUTOMATED COUNT: 41.4 FL (ref 35–45)
HCT VFR BLD CALC: 29.5 % (ref 37–47)
HEMOGLOBIN: 9.3 GM/DL (ref 12–16)
IMMATURE GRANS (ABS): 0.1 THOU/MM3 (ref 0–0.07)
IMMATURE GRANULOCYTES: 0.9 %
LYMPHOCYTES # BLD: 19.8 %
LYMPHOCYTES ABSOLUTE: 2.1 THOU/MM3 (ref 1–4.8)
MCH RBC QN AUTO: 27.4 PG (ref 26–33)
MCHC RBC AUTO-ENTMCNC: 31.5 GM/DL (ref 32.2–35.5)
MCV RBC AUTO: 87 FL (ref 81–99)
MONOCYTES # BLD: 6.8 %
MONOCYTES ABSOLUTE: 0.7 THOU/MM3 (ref 0.4–1.3)
NUCLEATED RED BLOOD CELLS: 0 /100 WBC
PLATELET # BLD: 206 THOU/MM3 (ref 130–400)
PMV BLD AUTO: 9.9 FL (ref 9.4–12.4)
RBC # BLD: 3.39 MILL/MM3 (ref 4.2–5.4)
SEG NEUTROPHILS: 71 %
SEGMENTED NEUTROPHILS ABSOLUTE COUNT: 7.5 THOU/MM3 (ref 1.8–7.7)
WBC # BLD: 10.5 THOU/MM3 (ref 4.8–10.8)

## 2018-10-14 PROCEDURE — 36415 COLL VENOUS BLD VENIPUNCTURE: CPT

## 2018-10-14 PROCEDURE — 6370000000 HC RX 637 (ALT 250 FOR IP): Performed by: OBSTETRICS & GYNECOLOGY

## 2018-10-14 PROCEDURE — 85025 COMPLETE CBC W/AUTO DIFF WBC: CPT

## 2018-10-14 RX ORDER — IBUPROFEN 800 MG/1
800 TABLET ORAL EVERY 8 HOURS PRN
Qty: 40 TABLET | Refills: 0 | Status: ON HOLD | OUTPATIENT
Start: 2018-10-14 | End: 2021-07-03 | Stop reason: HOSPADM

## 2018-10-14 RX ORDER — OXYCODONE HYDROCHLORIDE AND ACETAMINOPHEN 5; 325 MG/1; MG/1
1 TABLET ORAL EVERY 6 HOURS PRN
Qty: 28 TABLET | Refills: 0 | Status: SHIPPED | OUTPATIENT
Start: 2018-10-14 | End: 2018-10-21

## 2018-10-14 RX ADMIN — OXYCODONE HYDROCHLORIDE AND ACETAMINOPHEN 1 TABLET: 5; 325 TABLET ORAL at 10:47

## 2018-10-14 RX ADMIN — IBUPROFEN 800 MG: 200 SUSPENSION ORAL at 05:43

## 2018-10-14 RX ADMIN — DOCUSATE SODIUM 100 MG: 100 CAPSULE, LIQUID FILLED ORAL at 10:47

## 2018-10-14 ASSESSMENT — PAIN SCALES - GENERAL
PAINLEVEL_OUTOF10: 4
PAINLEVEL_OUTOF10: 5

## 2018-10-14 NOTE — FLOWSHEET NOTE
Postpartum education brochure given, teaching complete. [place] postpartum depression screening discussed with patient, instructed to contact her healthcare provider if her score is > 10. Patient voiced understanding. Mother's blood type is AB+. Rhogam is not indicated.

## 2021-06-29 ENCOUNTER — APPOINTMENT (OUTPATIENT)
Dept: CT IMAGING | Age: 31
DRG: 387 | End: 2021-06-29
Payer: COMMERCIAL

## 2021-06-29 ENCOUNTER — HOSPITAL ENCOUNTER (INPATIENT)
Age: 31
LOS: 5 days | Discharge: HOME | DRG: 387 | End: 2021-07-04
Attending: FAMILY MEDICINE | Admitting: FAMILY MEDICINE
Payer: COMMERCIAL

## 2021-06-29 DIAGNOSIS — K50.10 CROHN'S DISEASE OF COLON WITHOUT COMPLICATION (HCC): Primary | ICD-10-CM

## 2021-06-29 PROBLEM — R10.9 ABDOMINAL PAIN: Status: ACTIVE | Noted: 2021-06-29

## 2021-06-29 LAB
ALBUMIN SERPL-MCNC: 3.6 G/DL (ref 3.5–5.1)
ALP BLD-CCNC: 160 U/L (ref 38–126)
ALT SERPL-CCNC: 12 U/L (ref 11–66)
ANION GAP SERPL CALCULATED.3IONS-SCNC: 16 MEQ/L (ref 8–16)
AST SERPL-CCNC: 11 U/L (ref 5–40)
BACTERIA: ABNORMAL /HPF
BASOPHILS # BLD: 0.2 %
BASOPHILS ABSOLUTE: 0 THOU/MM3 (ref 0–0.1)
BILIRUB SERPL-MCNC: 0.9 MG/DL (ref 0.3–1.2)
BILIRUBIN URINE: ABNORMAL
BLOOD, URINE: ABNORMAL
BUN BLDV-MCNC: 5 MG/DL (ref 7–22)
C-REACTIVE PROTEIN: 8.72 MG/DL (ref 0–1)
CALCIUM SERPL-MCNC: 9.1 MG/DL (ref 8.5–10.5)
CASTS 2: ABNORMAL /LPF
CASTS UA: ABNORMAL /LPF
CHARACTER, URINE: CLEAR
CHLORIDE BLD-SCNC: 100 MEQ/L (ref 98–111)
CO2: 21 MEQ/L (ref 23–33)
COLOR: ABNORMAL
CREAT SERPL-MCNC: 0.7 MG/DL (ref 0.4–1.2)
CRYSTALS, UA: ABNORMAL
EOSINOPHIL # BLD: 0 %
EOSINOPHILS ABSOLUTE: 0 THOU/MM3 (ref 0–0.4)
EPITHELIAL CELLS, UA: ABNORMAL /HPF
ERYTHROCYTE [DISTWIDTH] IN BLOOD BY AUTOMATED COUNT: 14.4 % (ref 11.5–14.5)
ERYTHROCYTE [DISTWIDTH] IN BLOOD BY AUTOMATED COUNT: 42 FL (ref 35–45)
GFR SERPL CREATININE-BSD FRML MDRD: > 90 ML/MIN/1.73M2
GLUCOSE BLD-MCNC: 112 MG/DL (ref 70–108)
GLUCOSE URINE: NEGATIVE MG/DL
HCT VFR BLD CALC: 37.5 % (ref 37–47)
HEMOGLOBIN: 11.8 GM/DL (ref 12–16)
ICTOTEST: NEGATIVE
IMMATURE GRANS (ABS): 0.07 THOU/MM3 (ref 0–0.07)
IMMATURE GRANULOCYTES: 0.5 %
KETONES, URINE: 80
LACTIC ACID, SEPSIS: 0.8 MMOL/L (ref 0.5–1.9)
LEUKOCYTE ESTERASE, URINE: NEGATIVE
LIPASE: 23.2 U/L (ref 5.6–51.3)
LYMPHOCYTES # BLD: 5.5 %
LYMPHOCYTES ABSOLUTE: 0.7 THOU/MM3 (ref 1–4.8)
MCH RBC QN AUTO: 25.6 PG (ref 26–33)
MCHC RBC AUTO-ENTMCNC: 31.5 GM/DL (ref 32.2–35.5)
MCV RBC AUTO: 81.3 FL (ref 81–99)
MISCELLANEOUS 2: ABNORMAL
MONOCYTES # BLD: 3.8 %
MONOCYTES ABSOLUTE: 0.5 THOU/MM3 (ref 0.4–1.3)
NITRITE, URINE: NEGATIVE
NUCLEATED RED BLOOD CELLS: 0 /100 WBC
OSMOLALITY CALCULATION: 271.8 MOSMOL/KG (ref 275–300)
PH UA: 5.5 (ref 5–9)
PLATELET # BLD: 414 THOU/MM3 (ref 130–400)
PMV BLD AUTO: 8.7 FL (ref 9.4–12.4)
POTASSIUM REFLEX MAGNESIUM: 3.7 MEQ/L (ref 3.5–5.2)
PREGNANCY, SERUM: NEGATIVE
PROCALCITONIN: < 0.02 NG/ML (ref 0.01–0.09)
PROTEIN UA: 30
RBC # BLD: 4.61 MILL/MM3 (ref 4.2–5.4)
RBC URINE: ABNORMAL /HPF
RENAL EPITHELIAL, UA: ABNORMAL
SEG NEUTROPHILS: 90 %
SEGMENTED NEUTROPHILS ABSOLUTE COUNT: 11.5 THOU/MM3 (ref 1.8–7.7)
SODIUM BLD-SCNC: 137 MEQ/L (ref 135–145)
SPECIFIC GRAVITY, URINE: 1.03 (ref 1–1.03)
TOTAL PROTEIN: 7.3 G/DL (ref 6.1–8)
UROBILINOGEN, URINE: 1 EU/DL (ref 0–1)
WBC # BLD: 12.8 THOU/MM3 (ref 4.8–10.8)
WBC UA: ABNORMAL /HPF
YEAST: ABNORMAL

## 2021-06-29 PROCEDURE — 99284 EMERGENCY DEPT VISIT MOD MDM: CPT

## 2021-06-29 PROCEDURE — 80053 COMPREHEN METABOLIC PANEL: CPT

## 2021-06-29 PROCEDURE — 99222 1ST HOSP IP/OBS MODERATE 55: CPT | Performed by: FAMILY MEDICINE

## 2021-06-29 PROCEDURE — 2580000003 HC RX 258: Performed by: PHYSICIAN ASSISTANT

## 2021-06-29 PROCEDURE — 96375 TX/PRO/DX INJ NEW DRUG ADDON: CPT

## 2021-06-29 PROCEDURE — 1200000003 HC TELEMETRY R&B

## 2021-06-29 PROCEDURE — 36415 COLL VENOUS BLD VENIPUNCTURE: CPT

## 2021-06-29 PROCEDURE — 83605 ASSAY OF LACTIC ACID: CPT

## 2021-06-29 PROCEDURE — 84145 PROCALCITONIN (PCT): CPT

## 2021-06-29 PROCEDURE — 6360000004 HC RX CONTRAST MEDICATION: Performed by: PHYSICIAN ASSISTANT

## 2021-06-29 PROCEDURE — 6360000002 HC RX W HCPCS: Performed by: PHYSICIAN ASSISTANT

## 2021-06-29 PROCEDURE — 2580000003 HC RX 258: Performed by: FAMILY MEDICINE

## 2021-06-29 PROCEDURE — 86140 C-REACTIVE PROTEIN: CPT

## 2021-06-29 PROCEDURE — 83690 ASSAY OF LIPASE: CPT

## 2021-06-29 PROCEDURE — 81001 URINALYSIS AUTO W/SCOPE: CPT

## 2021-06-29 PROCEDURE — 74177 CT ABD & PELVIS W/CONTRAST: CPT

## 2021-06-29 PROCEDURE — 6360000002 HC RX W HCPCS: Performed by: FAMILY MEDICINE

## 2021-06-29 PROCEDURE — 6370000000 HC RX 637 (ALT 250 FOR IP): Performed by: FAMILY MEDICINE

## 2021-06-29 PROCEDURE — 96374 THER/PROPH/DIAG INJ IV PUSH: CPT

## 2021-06-29 PROCEDURE — 84703 CHORIONIC GONADOTROPIN ASSAY: CPT

## 2021-06-29 PROCEDURE — 85025 COMPLETE CBC W/AUTO DIFF WBC: CPT

## 2021-06-29 PROCEDURE — 96376 TX/PRO/DX INJ SAME DRUG ADON: CPT

## 2021-06-29 RX ORDER — METHYLPREDNISOLONE SODIUM SUCCINATE 125 MG/2ML
125 INJECTION, POWDER, LYOPHILIZED, FOR SOLUTION INTRAMUSCULAR; INTRAVENOUS ONCE
Status: COMPLETED | OUTPATIENT
Start: 2021-06-29 | End: 2021-06-29

## 2021-06-29 RX ORDER — PREDNISONE 20 MG/1
40 TABLET ORAL DAILY
Status: DISCONTINUED | OUTPATIENT
Start: 2021-06-29 | End: 2021-07-01

## 2021-06-29 RX ORDER — ONDANSETRON 2 MG/ML
4 INJECTION INTRAMUSCULAR; INTRAVENOUS ONCE
Status: COMPLETED | OUTPATIENT
Start: 2021-06-29 | End: 2021-06-29

## 2021-06-29 RX ORDER — ONDANSETRON 4 MG/1
4 TABLET, ORALLY DISINTEGRATING ORAL EVERY 8 HOURS PRN
Status: DISCONTINUED | OUTPATIENT
Start: 2021-06-29 | End: 2021-07-04 | Stop reason: HOSPADM

## 2021-06-29 RX ORDER — METRONIDAZOLE 500 MG/1
500 TABLET ORAL EVERY 8 HOURS SCHEDULED
Status: DISCONTINUED | OUTPATIENT
Start: 2021-06-29 | End: 2021-07-01

## 2021-06-29 RX ORDER — FENTANYL CITRATE 50 UG/ML
75 INJECTION, SOLUTION INTRAMUSCULAR; INTRAVENOUS ONCE
Status: COMPLETED | OUTPATIENT
Start: 2021-06-29 | End: 2021-06-29

## 2021-06-29 RX ORDER — SODIUM CHLORIDE 9 MG/ML
25 INJECTION, SOLUTION INTRAVENOUS PRN
Status: DISCONTINUED | OUTPATIENT
Start: 2021-06-29 | End: 2021-07-04 | Stop reason: HOSPADM

## 2021-06-29 RX ORDER — SODIUM CHLORIDE 9 MG/ML
INJECTION, SOLUTION INTRAVENOUS CONTINUOUS
Status: DISCONTINUED | OUTPATIENT
Start: 2021-06-30 | End: 2021-07-04 | Stop reason: HOSPADM

## 2021-06-29 RX ORDER — KETOROLAC TROMETHAMINE 30 MG/ML
30 INJECTION, SOLUTION INTRAMUSCULAR; INTRAVENOUS ONCE
Status: COMPLETED | OUTPATIENT
Start: 2021-06-29 | End: 2021-06-29

## 2021-06-29 RX ORDER — SODIUM CHLORIDE 0.9 % (FLUSH) 0.9 %
5-40 SYRINGE (ML) INJECTION PRN
Status: DISCONTINUED | OUTPATIENT
Start: 2021-06-29 | End: 2021-07-04 | Stop reason: HOSPADM

## 2021-06-29 RX ORDER — MORPHINE SULFATE 2 MG/ML
2 INJECTION, SOLUTION INTRAMUSCULAR; INTRAVENOUS EVERY 4 HOURS PRN
Status: DISCONTINUED | OUTPATIENT
Start: 2021-06-29 | End: 2021-06-30

## 2021-06-29 RX ORDER — CIPROFLOXACIN 2 MG/ML
400 INJECTION, SOLUTION INTRAVENOUS EVERY 12 HOURS
Status: DISCONTINUED | OUTPATIENT
Start: 2021-06-29 | End: 2021-07-01

## 2021-06-29 RX ORDER — ACETAMINOPHEN 325 MG/1
650 TABLET ORAL EVERY 6 HOURS PRN
Status: DISCONTINUED | OUTPATIENT
Start: 2021-06-29 | End: 2021-07-04 | Stop reason: HOSPADM

## 2021-06-29 RX ORDER — POLYETHYLENE GLYCOL 3350 17 G/17G
17 POWDER, FOR SOLUTION ORAL DAILY PRN
Status: DISCONTINUED | OUTPATIENT
Start: 2021-06-29 | End: 2021-07-04 | Stop reason: HOSPADM

## 2021-06-29 RX ORDER — ACETAMINOPHEN 650 MG/1
650 SUPPOSITORY RECTAL EVERY 6 HOURS PRN
Status: DISCONTINUED | OUTPATIENT
Start: 2021-06-29 | End: 2021-07-04 | Stop reason: HOSPADM

## 2021-06-29 RX ORDER — 0.9 % SODIUM CHLORIDE 0.9 %
1000 INTRAVENOUS SOLUTION INTRAVENOUS ONCE
Status: COMPLETED | OUTPATIENT
Start: 2021-06-29 | End: 2021-06-29

## 2021-06-29 RX ORDER — SODIUM CHLORIDE 0.9 % (FLUSH) 0.9 %
5-40 SYRINGE (ML) INJECTION EVERY 12 HOURS SCHEDULED
Status: DISCONTINUED | OUTPATIENT
Start: 2021-06-29 | End: 2021-07-04 | Stop reason: HOSPADM

## 2021-06-29 RX ORDER — ONDANSETRON 2 MG/ML
4 INJECTION INTRAMUSCULAR; INTRAVENOUS EVERY 6 HOURS PRN
Status: DISCONTINUED | OUTPATIENT
Start: 2021-06-29 | End: 2021-07-04 | Stop reason: HOSPADM

## 2021-06-29 RX ADMIN — IOHEXOL 50 ML: 240 INJECTION, SOLUTION INTRATHECAL; INTRAVASCULAR; INTRAVENOUS; ORAL at 16:05

## 2021-06-29 RX ADMIN — CIPROFLOXACIN 400 MG: 2 INJECTION, SOLUTION INTRAVENOUS at 21:26

## 2021-06-29 RX ADMIN — SODIUM CHLORIDE 1000 ML: 9 INJECTION, SOLUTION INTRAVENOUS at 16:55

## 2021-06-29 RX ADMIN — SODIUM CHLORIDE 1000 ML: 9 INJECTION, SOLUTION INTRAVENOUS at 14:52

## 2021-06-29 RX ADMIN — MORPHINE SULFATE 2 MG: 2 INJECTION, SOLUTION INTRAMUSCULAR; INTRAVENOUS at 19:35

## 2021-06-29 RX ADMIN — IOPAMIDOL 80 ML: 755 INJECTION, SOLUTION INTRAVENOUS at 15:55

## 2021-06-29 RX ADMIN — FENTANYL CITRATE 75 MCG: 50 INJECTION, SOLUTION INTRAMUSCULAR; INTRAVENOUS at 16:28

## 2021-06-29 RX ADMIN — KETOROLAC TROMETHAMINE 30 MG: 30 INJECTION, SOLUTION INTRAMUSCULAR; INTRAVENOUS at 16:29

## 2021-06-29 RX ADMIN — FENTANYL CITRATE 75 MCG: 50 INJECTION, SOLUTION INTRAMUSCULAR; INTRAVENOUS at 14:52

## 2021-06-29 RX ADMIN — PREDNISONE 40 MG: 20 TABLET ORAL at 21:26

## 2021-06-29 RX ADMIN — SODIUM CHLORIDE 25 ML: 9 INJECTION, SOLUTION INTRAVENOUS at 18:26

## 2021-06-29 RX ADMIN — ONDANSETRON 4 MG: 2 INJECTION INTRAMUSCULAR; INTRAVENOUS at 14:52

## 2021-06-29 RX ADMIN — METHYLPREDNISOLONE SODIUM SUCCINATE 125 MG: 125 INJECTION, POWDER, FOR SOLUTION INTRAMUSCULAR; INTRAVENOUS at 16:55

## 2021-06-29 RX ADMIN — METRONIDAZOLE 500 MG: 500 TABLET ORAL at 21:27

## 2021-06-29 ASSESSMENT — ENCOUNTER SYMPTOMS
VOMITING: 1
ABDOMINAL PAIN: 1
NAUSEA: 1

## 2021-06-29 ASSESSMENT — PAIN SCALES - GENERAL
PAINLEVEL_OUTOF10: 7
PAINLEVEL_OUTOF10: 3
PAINLEVEL_OUTOF10: 9
PAINLEVEL_OUTOF10: 9
PAINLEVEL_OUTOF10: 7
PAINLEVEL_OUTOF10: 10
PAINLEVEL_OUTOF10: 9

## 2021-06-29 ASSESSMENT — PAIN DESCRIPTION - ORIENTATION
ORIENTATION: RIGHT;MID;LOWER
ORIENTATION: RIGHT;LOWER

## 2021-06-29 ASSESSMENT — PAIN DESCRIPTION - LOCATION
LOCATION: ABDOMEN
LOCATION: ABDOMEN

## 2021-06-29 ASSESSMENT — PAIN DESCRIPTION - FREQUENCY
FREQUENCY: CONTINUOUS
FREQUENCY: CONTINUOUS

## 2021-06-29 ASSESSMENT — PAIN DESCRIPTION - DESCRIPTORS
DESCRIPTORS: ACHING
DESCRIPTORS: SHARP

## 2021-06-29 ASSESSMENT — PAIN DESCRIPTION - PAIN TYPE
TYPE: ACUTE PAIN
TYPE: ACUTE PAIN

## 2021-06-29 NOTE — ED TRIAGE NOTES
Pt comes to the ED via lobby with LLQ pain. Pt states she has a hx of crohns. Pt was recently seen at [facility] and CT states she may have inflammation around her spleen. Pt states she may feel constipatied and hasn't eaten much and has been losing weight.

## 2021-06-29 NOTE — LETTER
[facility]  [address] [facility] [address]  Phone: [phone]    No name on file. July 4, 2021     Patient: Leia Lee   YOB: 1990   Date of Visit: 6/29/2021       To Whom It May Concern: It is my medical opinion that Carolyn Madera may return to work on 07/12/2021. If you have any questions or concerns, please don't hesitate to call. Sincerely,  Verbally Dr. Jena Bach.    Electronically signed by Wing San RN on 7/4/2021 at 3:17 PM

## 2021-06-29 NOTE — ED NOTES
Patient transferred to [facility] room 020 Nurse informed.       Zakiya Toure  06/29/21 1724

## 2021-06-29 NOTE — LETTER
[facility]  [address] [facility] [address]  Phone: [phone]             June 30, 2021    Patient: Saúl Linder   YOB: 1990   Date of Visit: 6/29/2021       To Whom It May Concern:    Kailash Jha was seen and treated in our facility  Beginning on 6/29/2021 through current.        Sincerely,       Dr Ada Chowdhury  /  Elba Herron RN        Signature:__________________________________

## 2021-06-29 NOTE — ED NOTES
ED to inpatient nurses report    Chief Complaint   Patient presents with    Abdominal Pain      Present to ED from home  LOC: alert and orientated to name, place, date  Vital signs   Vitals:    06/29/21 1424 06/29/21 1525 06/29/21 1645   BP: 108/66 95/62 99/63   Pulse: 117 98 110   Resp: 16 16 18   Temp: 98.3 °F (36.8 °C)     TempSrc: Oral     SpO2: 100% 100% 100%   Weight: 85 lb (38.6 kg)     Height: 4' 11\" (1.499 m)        Oxygen Baseline RA    Current needs required RA Bipap/Cpap No  LDAs:   Peripheral IV 06/29/21 Right Antecubital (Active)   Site Assessment Clean;Dry; Intact 06/29/21 1646   Line Status Specimen collected 06/29/21 1452   Dressing Status Clean;Dry; Intact 06/29/21 1452   Dressing Intervention New 06/29/21 1452     Mobility: Independent  Pending ED orders: None  Present condition: Stable    Electronically signed by Osorio Silva RN on 6/29/2021 at 5:07 PM       Osorio Silva RN  06/29/21 6079

## 2021-06-29 NOTE — ED PROVIDER NOTES
[facility]  eMERGENCY dEPARTMENT eNCOUnter          CHIEF COMPLAINT       Chief Complaint   Patient presents with    Abdominal Pain       Nurses Notes reviewed and I agree except as noted inthe HPI. HISTORY OF PRESENT ILLNESS    Luh Mcclure is a 31 y.o. female who presents to the Emergency Department for the evaluation of abdominal pain. Patient reports a history of Crohn's disease. She was previously on Humira but due to loss of insurance has been off of this medication for a while. She was given a trial of medications after recent visit at [place] [address] and states she just got approved through [facility] for reinitiation of the medication on , is scheduled to start this . She states she has been having some issues with generalized abdominal pain over the past 2 weeks. Around 9 AM this morning she had a bowel movement and noticed severe worsening of pain which is not typical of her prior Crohn's flares. She has had associated nausea and vomiting over the past 2 days as well as decreased oral intake and appetite. She has not tried any treatment at home. Denies any associated fever, constipation, diarrhea, urinary changes or blood in her emesis/stool. The HPI was provided by the patient. REVIEW OF SYSTEMS     Review of Systems   Gastrointestinal: Positive for abdominal pain, nausea and vomiting. All other systems reviewed and are negative. PAST MEDICAL HISTORY    has a past medical history of Crohn's colitis ([facility] [address] SURGICAL HISTORY      has a past surgical history that includes [place] tooth extraction and pr  delivery only (N/A, 10/12/2018).     CURRENT MEDICATIONS       Previous Medications    ADALIMUMAB (HUMIRA) 40 MG/0.8ML INJECTION    Inject 40 mg into the skin once    IBUPROFEN (IBU) 800 MG TABLET    Take 1 tablet by mouth every 8 hours as needed for Pain    PRENATAL VIT-FE FUMARATE-FA (PRENATAL 19 PO)    Take by mouth       ALLERGIES     is allergic to duricef [cefadroxil] and penicillins. FAMILY HISTORY     She indicated that the status of her mother is unknown.   family history includes Other in her mother. SOCIAL HISTORY      reports that she has never smoked. She has never used smokeless tobacco. She reports that she does not drink alcohol and does not use drugs. PHYSICAL EXAM     INITIAL VITALS:  height is 4' 11\" (1.499 m) and weight is 85 lb (38.6 kg). Her oral temperature is 98.3 °F (36.8 °C). Her blood pressure is 99/63 and her pulse is 110. Her respiration is 18 and oxygen saturation is 100%. Physical Exam  Vitals and nursing note reviewed. Constitutional:       Comments: Appears uncomfortable, occasional grimacing and clutching the abdomen in pain   HENT:      Head: Normocephalic and atraumatic. Cardiovascular:      Rate and Rhythm: Normal rate. Pulmonary:      Effort: Pulmonary effort is normal. No respiratory distress. Abdominal:      Tenderness: There is generalized abdominal tenderness. There is guarding. Skin:     General: Skin is warm and dry. Neurological:      General: No focal deficit present. Mental Status: She is alert. Psychiatric:         Mood and Affect: Mood normal.         DIFFERENTIAL DIAGNOSIS:   Differential diagnoses are discussed    DIAGNOSTIC RESULTS     EKG: All EKG's are interpreted by the Emergency Department Physician who either signs or Co-signsthis chart in the absence of a cardiologist.          RADIOLOGY: non-plain film images(s) such as CT, Ultrasound and MRI are read by the radiologist.    CT ABDOMEN PELVIS W IV CONTRAST Additional Contrast? Oral   Final Result   Markedly thickened edematous distal ileum including the terminal ileum highly suggestive of active inflammatory bowel disease. **This report has been created using voice recognition software. It may contain minor errors which are inherent in voice recognition technology. ** Final report electronically signed by Dr. Beto Whitaker on 6/29/2021 4:16 PM          LABS:      Labs Reviewed   CBC WITH AUTO DIFFERENTIAL - Abnormal; Notable for the following components:       Result Value    WBC 12.8 (*)     Hemoglobin 11.8 (*)     MCH 25.6 (*)     MCHC 31.5 (*)     Platelets 862 (*)     MPV 8.7 (*)     Segs Absolute 11.5 (*)     Lymphocytes Absolute 0.7 (*)     All other components within normal limits   COMPREHENSIVE METABOLIC PANEL W/ REFLEX TO MG FOR LOW K - Abnormal; Notable for the following components:    Glucose 112 (*)     BUN 5 (*)     CO2 21 (*)     Alkaline Phosphatase 160 (*)     All other components within normal limits   C-REACTIVE PROTEIN - Abnormal; Notable for the following components:    CRP 8.72 (*)     All other components within normal limits   URINE WITH REFLEXED MICRO - Abnormal; Notable for the following components:    Bilirubin Urine MODERATE (*)     Ketones, Urine 80 (*)     Blood, Urine LARGE (*)     Protein, UA 30 (*)     Color, UA DK YELLOW (*)     All other components within normal limits   OSMOLALITY - Abnormal; Notable for the following components:    Osmolality Calc 271.8 (*)     All other components within normal limits   LIPASE   HCG, SERUM, QUALITATIVE   LACTATE, SEPSIS   PROCALCITONIN   BILE ACIDS, TOTAL   ANION GAP   GLOMERULAR FILTRATION RATE, ESTIMATED       EMERGENCY DEPARTMENT COURSE:   Vitals:    Vitals:    06/29/21 1424 06/29/21 1525 06/29/21 1645   BP: 108/66 95/62 99/63   Pulse: 117 98 110   Resp: 16 16 18   Temp: 98.3 °F (36.8 °C)     TempSrc: Oral     SpO2: 100% 100% 100%   Weight: 85 lb (38.6 kg)     Height: 4' 11\" (1.499 m)        3:05 PM EDT: The patient was seen and evaluated. Patient presents tachycardic for complaint of abdominal pain. States it does not feel consistent with prior Crohn's disease flares and she is concerned it could be her gallbladder or appendix.   She has generalized tenderness with guarding on exam, appears uncomfortable. She was given IV fluids with fentanyl and Zofran. She did have some borderline hypotension but improvement in heart rate though she denied any improvement in her pain. Laboratory results revealed 12,800 white blood cell count with hemoglobin 11.8. CRP elevated at 8.72 with lactic acid and procalcitonin within normal limits. CT of the abdomen shows markedly thickened edematous distal ileum including the terminal ileum, highly suggestive of active inflammatory bowel disease. Results were discussed with the patient. Due to the persistence of her severe pain as well as rebound tachycardia, discussed potential admission and she was agreeable with this. Discussed with attending provider who is agreeable as well. Will initiate treatment with Solu-Medrol. I did reach out to Dr. Saniya Morales, GI on call for Dr. Carolin Tinajero. No return call after 45 minutes. Discussed with the hospitalist service will admit for further care. CRITICAL CARE:   None    CONSULTS:  Hospitalist    PROCEDURES:  None    FINAL IMPRESSION      1. Crohn's disease of colon without complication ([facility] [address]          DISPOSITION/PLAN   Admit    PATIENT REFERRED TO:  No follow-up provider specified.     DISCHARGEMEDICATIONS:  New Prescriptions    No medications on file       (Please note that portions of this note were completedwith a voice recognition program.  Efforts were made to edit the dictations but occasionally words are mis-transcribed.)        Dany Walker PA-C  06/29/21 6421

## 2021-06-29 NOTE — H&P
History & Physical        Patient:  Leia Lee  YOB: 1990    MRN: [id number]     Acct: [de-identified]    PCP: Wellington Shukla MD    Date of Admission: 2021    Date of Service: Pt seen/examined on 21    Chief Complaint:    Abdominal pain    History Of Present Illness:    31 y.o. female, with a hx of Crohn's disease, who presented to [address] [place] [address] with generalized abdominal pain, intermittent for the past 2 weeks, non radiating, not related to food intake and accompanied by night sweats and chills. This morning, pain has gotten more severe, around 9-10/10, accompanied nausea but not vomiting. She denies any fever, nausea, vomiting, diarrhea, constipation, hematochezia or melena. ]    She has been on Humira for around 2 years, but had to stop last February due to a change in insurance. She was noted to be tachycardic in ED. CT abdomen/plv showed Markedly thickened edematous distal ileum including the terminal ileum highly suggestive of active inflammatory bowel disease. She was given Solumedrol x 1 and admitted under the hospitalist service. Past Medical History:          Diagnosis Date    Crohn's colitis [facility])        Past Surgical History:          Procedure Laterality Date    PA  DELIVERY ONLY N/A 10/12/2018     SECTION performed by Rad Portillo MD at [address] [place] [address]         Medications Prior to Admission:      Prior to Admission medications    Medication Sig Start Date End Date Taking?  Authorizing Provider   ibuprofen (IBU) 800 MG tablet Take 1 tablet by mouth every 8 hours as needed for Pain 10/14/18   Olman Monroy MD   adalimumab (HUMIRA) 40 MG/0.8ML injection Inject 40 mg into the skin once    Historical Provider, MD   Prenatal Vit-Fe Fumarate-FA (PRENATAL 19 PO) Take by mouth    Historical Provider, MD       Allergies:  Duricef [cefadroxil] and Penicillins    Social History:      TOBACCO:   reports that she has never smoked. She has never used smokeless tobacco.  ETOH:   reports no history of alcohol use. Family History:       Reviewed in detail and negative for DM, CAD, Cancer, CVA. Positive as follows:        Problem Relation Age of Onset    Other Mother        Diet:  ADULT DIET; Clear Liquid    REVIEW OF SYSTEMS:   Pertinent positives as noted in the HPI. All other systems reviewed and negative. PHYSICAL EXAM:    BP (!) 101/59   Pulse 108   Temp 99.1 °F (37.3 °C) (Oral)   Resp 20   Ht 4' 11\" (1.499 m)   Wt 85 lb (38.6 kg)   SpO2 97%   BMI 17.17 kg/m²     General appearance:  No apparent distress, appears stated age and cooperative. HEENT:  Normal cephalic, atraumatic without obvious deformity. Pupils equal, round, and reactive to light. Extra ocular muscles intact. Conjunctivae/corneas clear. Neck: Supple, with full range of motion. No jugular venous distention. Trachea midline. Respiratory:  Normal respiratory effort. Clear to auscultation, bilaterally without Rales/Wheezes/Rhonchi. Cardiovascular:  Regular rate and rhythm with normal S1/S2 without murmurs, rubs or gallops. Abdomen: Soft, diffused tenderness but more prominent in the hypogastric/LLQ, voluntary guarding, no rigidity  Musculoskeletal:  No clubbing, cyanosis or edema bilaterally. Full range of motion without deformity. Skin: Skin color, texture, turgor normal.  No rashes or lesions. Neurologic:  Neurovascularly intact without any focal sensory/motor deficits.  Cranial nerves: II-XII intact, grossly non-focal.  Psychiatric:  Alert and oriented, thought content appropriate, normal insight  Capillary Refill: Brisk,< 3 seconds   Peripheral Pulses: +2 palpable, equal bilaterally       Labs:     Recent Labs     06/29/21  1451   WBC 12.8*   HGB 11.8*   HCT 37.5   *     Recent Labs     06/29/21  1451      K 3.7      CO2 21*   BUN 5*   CREATININE 0.7   CALCIUM 9.1     Recent Labs     06/29/21  1451   AST 11   ALT 12 BILITOT 0.9   ALKPHOS 160*     No results for input(s): INR in the last 72 hours. No results for input(s): Jadene Moh in the last 72 hours. Urinalysis:      Lab Results   Component Value Date    NITRU NEGATIVE 06/29/2021    WBCUA 0-2 06/29/2021    BACTERIA NONE SEEN 06/29/2021    RBCUA 10-15 06/29/2021    BLOODU LARGE 06/29/2021    GLUCOSEU NEGATIVE 06/29/2021       Intake & Output:  No intake/output data recorded. No intake/output data recorded. Radiology:       CT ABDOMEN PELVIS W IV CONTRAST Additional Contrast? Oral   Final Result   Markedly thickened edematous distal ileum including the terminal ileum highly suggestive of active inflammatory bowel disease. **This report has been created using voice recognition software. It may contain minor errors which are inherent in voice recognition technology. **      Final report electronically signed by Dr. Ruy Blancas on 6/29/2021 4:16 PM             Code Status: Full Code      PT/OT Eval Status:     [facility] Problems    Diagnosis Date Noted    Abdominal pain [R10.9] 06/29/2021       PLAN:    Acute Flare of Crohn's Disease  CT abdomen/pelvis shows markedly thickened edematous distal ileum including the terminal ileum highly suggestive of active inflammatory bowel disease. Leukocytosis, CRP elevated  Started on Cipro + Flagyl for now  Patient given solumedrol in ED, continue with steroids  Adequate pain control with tylenol and morphine  Clear liquid for now, antiemetics  Consult GI      Thank you Dann Zhang MD for the opportunity to be involved in this patient's care.     Electronically signed by Gabriela Bansal MD on 6/29/2021 at 6:16 PM

## 2021-06-30 LAB
ADENOVIRUS F 40 41 PCR: NOT DETECTED
ANION GAP SERPL CALCULATED.3IONS-SCNC: 10 MEQ/L (ref 8–16)
ASTROVIRUS PCR: NOT DETECTED
BUN BLDV-MCNC: 5 MG/DL (ref 7–22)
CALCIUM SERPL-MCNC: 8.1 MG/DL (ref 8.5–10.5)
CAMPYLOBACTER PCR: NOT DETECTED
CHLORIDE BLD-SCNC: 107 MEQ/L (ref 98–111)
CLOSTRIDIUM DIFFICILE, PCR: NOT DETECTED
CO2: 22 MEQ/L (ref 23–33)
CREAT SERPL-MCNC: 0.5 MG/DL (ref 0.4–1.2)
CRYPTOSPORIDIUM PCR: NOT DETECTED
CYCLOSPORA CAYETANENSIS PCR: NOT DETECTED
E COLI 0157 PCR: NORMAL
E COLI ENTEROAGGREGATIVE PCR: NOT DETECTED
E COLI ENTEROPATHOGENIC PCR: NOT DETECTED
E COLI ENTEROTOXIGENIC PCR: NOT DETECTED
E COLI SHIGA LIKE TOXIN PCR: NOT DETECTED
E COLI SHIGELLA/ENTEROINVASIVE PCR: NOT DETECTED
E HISTOLYTICA GI FILM ARRAY: NOT DETECTED
ERYTHROCYTE [DISTWIDTH] IN BLOOD BY AUTOMATED COUNT: 14.3 % (ref 11.5–14.5)
ERYTHROCYTE [DISTWIDTH] IN BLOOD BY AUTOMATED COUNT: 43.1 FL (ref 35–45)
GFR SERPL CREATININE-BSD FRML MDRD: > 90 ML/MIN/1.73M2
GIARDIA LAMBLIA PCR: NOT DETECTED
GLUCOSE BLD-MCNC: 135 MG/DL (ref 70–108)
HCT VFR BLD CALC: 31.2 % (ref 37–47)
HEMOGLOBIN: 9.7 GM/DL (ref 12–16)
MCH RBC QN AUTO: 25.8 PG (ref 26–33)
MCHC RBC AUTO-ENTMCNC: 31.1 GM/DL (ref 32.2–35.5)
MCV RBC AUTO: 83 FL (ref 81–99)
NOROVIRUS GI GII PCR: NOT DETECTED
PLATELET # BLD: 321 THOU/MM3 (ref 130–400)
PLESIOMONAS SHIGELLOIDES PCR: NOT DETECTED
PMV BLD AUTO: 9 FL (ref 9.4–12.4)
POTASSIUM REFLEX MAGNESIUM: 3.8 MEQ/L (ref 3.5–5.2)
RBC # BLD: 3.76 MILL/MM3 (ref 4.2–5.4)
ROTAVIRUS A PCR: NOT DETECTED
SALMONELLA PCR: NOT DETECTED
SAPOVIRUS PCR: NOT DETECTED
SODIUM BLD-SCNC: 139 MEQ/L (ref 135–145)
VIBRIO CHOLERAE PCR: NOT DETECTED
VIBRIO PCR: NOT DETECTED
WBC # BLD: 11.6 THOU/MM3 (ref 4.8–10.8)
YERSINIA ENTEROCOLITICA PCR: NOT DETECTED

## 2021-06-30 PROCEDURE — 6360000002 HC RX W HCPCS: Performed by: FAMILY MEDICINE

## 2021-06-30 PROCEDURE — 2580000003 HC RX 258: Performed by: FAMILY MEDICINE

## 2021-06-30 PROCEDURE — 99232 SBSQ HOSP IP/OBS MODERATE 35: CPT | Performed by: INTERNAL MEDICINE

## 2021-06-30 PROCEDURE — 80048 BASIC METABOLIC PNL TOTAL CA: CPT

## 2021-06-30 PROCEDURE — 6360000002 HC RX W HCPCS: Performed by: INTERNAL MEDICINE

## 2021-06-30 PROCEDURE — 1200000003 HC TELEMETRY R&B

## 2021-06-30 PROCEDURE — 6370000000 HC RX 637 (ALT 250 FOR IP): Performed by: FAMILY MEDICINE

## 2021-06-30 PROCEDURE — 2580000003 HC RX 258: Performed by: PHYSICIAN ASSISTANT

## 2021-06-30 PROCEDURE — 85027 COMPLETE CBC AUTOMATED: CPT

## 2021-06-30 PROCEDURE — 0097U HC GI PTHGN MULT REV TRANS & AMP PRB TECH 22 TRGT: CPT

## 2021-06-30 PROCEDURE — 36415 COLL VENOUS BLD VENIPUNCTURE: CPT

## 2021-06-30 RX ORDER — 0.9 % SODIUM CHLORIDE 0.9 %
500 INTRAVENOUS SOLUTION INTRAVENOUS ONCE
Status: COMPLETED | OUTPATIENT
Start: 2021-06-30 | End: 2021-06-30

## 2021-06-30 RX ORDER — MORPHINE SULFATE 2 MG/ML
2 INJECTION, SOLUTION INTRAMUSCULAR; INTRAVENOUS EVERY 4 HOURS PRN
Status: DISCONTINUED | OUTPATIENT
Start: 2021-06-30 | End: 2021-06-30

## 2021-06-30 RX ORDER — HYOSCYAMINE SULFATE 0.125 MG
125 TABLET,DISINTEGRATING ORAL EVERY 4 HOURS PRN
Status: DISCONTINUED | OUTPATIENT
Start: 2021-06-30 | End: 2021-07-04 | Stop reason: HOSPADM

## 2021-06-30 RX ORDER — MORPHINE SULFATE 2 MG/ML
2 INJECTION, SOLUTION INTRAMUSCULAR; INTRAVENOUS EVERY 4 HOURS PRN
Status: DISCONTINUED | OUTPATIENT
Start: 2021-06-30 | End: 2021-07-01

## 2021-06-30 RX ADMIN — SODIUM CHLORIDE: 9 INJECTION, SOLUTION INTRAVENOUS at 22:42

## 2021-06-30 RX ADMIN — SODIUM CHLORIDE 500 ML: 9 INJECTION, SOLUTION INTRAVENOUS at 02:46

## 2021-06-30 RX ADMIN — METRONIDAZOLE 500 MG: 500 TABLET ORAL at 13:42

## 2021-06-30 RX ADMIN — SODIUM CHLORIDE, PRESERVATIVE FREE 10 ML: 5 INJECTION INTRAVENOUS at 09:48

## 2021-06-30 RX ADMIN — SODIUM CHLORIDE: 9 INJECTION, SOLUTION INTRAVENOUS at 13:42

## 2021-06-30 RX ADMIN — METRONIDAZOLE 500 MG: 500 TABLET ORAL at 05:34

## 2021-06-30 RX ADMIN — MORPHINE SULFATE 2 MG: 2 INJECTION, SOLUTION INTRAMUSCULAR; INTRAVENOUS at 22:40

## 2021-06-30 RX ADMIN — MORPHINE SULFATE 2 MG: 2 INJECTION, SOLUTION INTRAMUSCULAR; INTRAVENOUS at 17:07

## 2021-06-30 RX ADMIN — SODIUM CHLORIDE: 9 INJECTION, SOLUTION INTRAVENOUS at 05:34

## 2021-06-30 RX ADMIN — ACETAMINOPHEN 650 MG: 325 TABLET ORAL at 11:37

## 2021-06-30 RX ADMIN — PREDNISONE 40 MG: 20 TABLET ORAL at 09:26

## 2021-06-30 RX ADMIN — CIPROFLOXACIN 400 MG: 2 INJECTION, SOLUTION INTRAVENOUS at 09:47

## 2021-06-30 ASSESSMENT — PAIN SCALES - GENERAL
PAINLEVEL_OUTOF10: 8
PAINLEVEL_OUTOF10: 7
PAINLEVEL_OUTOF10: 3
PAINLEVEL_OUTOF10: 3
PAINLEVEL_OUTOF10: 0
PAINLEVEL_OUTOF10: 6
PAINLEVEL_OUTOF10: 3
PAINLEVEL_OUTOF10: 3

## 2021-06-30 NOTE — CARE COORDINATION
6/30/21, 8:17 AM EDT  DISCHARGE PLANNING EVALUATION:    Maeve Joseph       Admitted: 6/29/2021/ Ruddy Mery 13 day: 1   Location: -20/020-A Reason for admit: Abdominal pain [R10.9]   PMH:  has a past medical history of Crohn's colitis ([facility] [address] To ED with abdominal pain, hx Crohn's dz, was taking Humira in past (lost insurance coverage)  CT abdomen: Markedly thickened edematous distal ileum including the terminal ileum highly suggestive of active inflammatory bowel disease. Procedure: na  Barriers to Discharge:  Cipro/Flagyl IV, Prednisone,strict stool counts, pain control, IVF, clear liquids. PCP: Maritza Anand MD  Readmission Risk Score: 7%    Patient Goals/Plan/Treatment Preferences: Met with Clau this morning. She currently lives home with her  and child. She is independent of ADL's, has current PCP, and will be on her 's insurance starting tomorrow 07/01/21. She was approved for her Humira and this will be delivered on 07/09/21. She declines HH or needs. Transportation/Food Security/Housekeeping Addressed:  No issues identified.

## 2021-06-30 NOTE — PLAN OF CARE
Problem: Pain:  Goal: Patient's pain/discomfort is manageable  Description: Patient's pain/discomfort is manageable  6/30/2021 0531 by Desmond Hagen RN  Outcome: Met This Shift  Note: Frequent position changes, medications, and warm blankets helped with patient's comfort level. Patient had pain 6/10 medications given and now patient states that she is at a tolerable level. Problem: Discharge Planning:  Goal: Patients continuum of care needs are met  Description: Patients continuum of care needs are met  6/30/2021 0531 by Desmond Hagen RN  Outcome: Met This Shift  Note: Patient will discharge home with  when medically stable. Problem: Bowel Function - Altered:  Goal: Bowel elimination is within specified parameters  Description: Bowel elimination is within specified parameters  Outcome: Ongoing  Note: Patient continued to have diarrhea during this shift. Patient was given a bolus and put on continuous fluids. Care plan reviewed with patient. Patient verbalizes understanding of plan of care and contributes to goal setting.

## 2021-06-30 NOTE — PROGRESS NOTES
Hospitalist      Patient:  Jacobo Garcesw    Unit/Bed:6K-20/020-A  YOB: 1990  MRN: [id number]   Acct: [de-identified]     PCP: Citlaly Pugh MD  Date of Admission: 6/29/2021        Assessment and Plan:        1. Crohn's exacerbation: We will continue IV Cipro and Flagyl for now GI put prednisone on hold, will need to avoid NSAID due to the ulcerogenic potential,  2. Lower abdominal pain secondary to #1  3. Nausea and vomiting secondary to #1  4. Chronic anemia probably secondary to lack of B12 absorption    CC: Abdominal pain    HPI: Initial H&P \"She came to the ED yesterday for abdominal pain, nausea, vomiting, and diarrhea that started approximately 2 weeks ago. Her pain is mostly lower bilaterally and is continuous. It is worse with eating, does not matter what she eats. A heating pad sometimes helps the pain. Denies hematemesis. She no longer feels nauseous. She is tolerating a clear liquid diet without increased pain or nausea. She denies melena and hematochezia. Denies fever and chills. CT A/P demonstrated markedly thickened edematous distal ileum including the terminal ileum highly suggestive of active inflammatory bowel disease. Initial WBC 12.8. Afebrile since admission. She was started on Humira in 2017 with good control of the Crohn's. However she lost her insurance and went off the Humira in 02/2021. She was recently given a couple samples of Humira and was recently approved with a prior authorization for Humira for one year. She states her Humira is supposed to be delivered 07/09/21. She was started on Prednisone 20mg daily a couple months ago until she could get restarted on the Humira. She states she finished the Prednisone a couple weeks ago. Last colonoscopy 2017 demonstrated some erosions and friability to the terminal ileum, patchy focal erythema in the distal sigmoid/rectum, otherwise unremarkable. Pathology demonstrated acute & chronic ileitis.  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 Pertinent positives noted. Otherwise ROS is negative) : Abdominal pain is improving    PMH:  Per HPI and       Diagnosis Date    Crohn's colitis ([address]      SHX:        Procedure Laterality Date    MD  DELIVERY ONLY N/A 10/12/2018     SECTION performed by Reji García MD at [address]:       Problem Relation Age of Onset    Other Mother      SOCHX:   Social History     Socioeconomic History    Marital status:      Spouse name: None    Number of children: None    Years of education: None    Highest education level: None   Occupational History    None   Tobacco Use    Smoking status: Never Smoker    Smokeless tobacco: Never Used   Vaping Use    Vaping Use: Never used   Substance and Sexual Activity    Alcohol use: No    Drug use: No    Sexual activity: Yes     Partners: Male   Other Topics Concern    None   Social History Narrative    None     Social Determinants of Health     Financial Resource Strain:     Difficulty of Paying Living Expenses:    Food Insecurity:     Worried About Running Out of Food in the Last Year:     Ran Out of Food in the Last Year:    Transportation Needs:     Lack of Transportation (Medical):      Lack of Transportation (Non-Medical):    Physical Activity:     Days of Exercise per Week:     Minutes of Exercise per Session:    Stress:     Feeling of Stress :    Social Connections:     Frequency of Communication with Friends and Family:     Frequency of Social Gatherings with Friends and Family:     Attends [employer] Services:     Active Member of Clubs or Organizations:     Attends Club or Organization Meetings:     Marital Status: Intimate Partner Violence:     Fear of Current or Ex-Partner:     Emotionally Abused:     Physically Abused:     Sexually Abused: Allergies: Duricef [cefadroxil] and Penicillins  Medications:     sodium chloride 25 mL (06/29/21 1826)    sodium chloride 125 mL/hr at 06/30/21 0534      sodium chloride flush  5-40 mL Intravenous 2 times per day    enoxaparin  40 mg Subcutaneous Daily    ciprofloxacin  400 mg Intravenous Q12H    metroNIDAZOLE  500 mg Oral 3 times per day    [Held by provider] predniSONE  40 mg Oral Daily     Prior to Admission medications    Medication Sig Start Date End Date Taking? Authorizing Provider   ibuprofen (IBU) 800 MG tablet Take 1 tablet by mouth every 8 hours as needed for Pain 10/14/18   Biju Tay MD   adalimumab (HUMIRA) 40 MG/0.8ML injection Inject 40 mg into the skin once    Historical Provider, MD   Prenatal Vit-Fe Fumarate-FA (PRENATAL 19 PO) Take by mouth    Historical Provider, MD      PHYSICAL EXAM:    BP 97/63   Pulse 65   Temp 97.6 °F (36.4 °C) (Oral)   Resp 18   Ht 4' 11\" (1.499 m)   Wt 89 lb 12.8 oz (40.7 kg)   SpO2 98%   Breastfeeding No   BMI 18.14 kg/m²     General appearance:  No apparent distress, appears stated age and cooperative. HEENT:  Normal cephalic, atraumatic without obvious deformity. Pupils equal, round, and reactive to light. Extra ocular muscles intact. Conjunctivae/corneas clear. Neck: Supple, with full range of motion. no jugular venous distention. Trachea midline. no carotid bruits  Respiratory:  Normal respiratory effort. Clear to auscultation, bilaterally without Rales/Wheezes/Rhonchi. Breath sounds equal bilaterally  Cardiovascular:  Regular rate and rhythm with normal S1/S2 without murmurs, rubs or gallops. PMI non displaced  Abdomen: Soft, tender, distended with normal bowel sounds. No guarding, rebound. Musculoskeletal:  No clubbing, cyanosis or edema bilaterally. Full range of motion without deformity.   Skin: Skin color, texture, turgor normal.  No rashes or lesions, or suspicious lesions. Neurologic:  Neurovascularly intact without any focal sensory/motor deficits. Cranial nerves: II-XII intact, grossly non-focal.  Psychiatric:  Alert and oriented, thought content appropriate, normal insight  Capillary Refill: Brisk,< 2 seconds   Peripheral Pulses: +2 palpable, equal bilaterally upper and lower extremities  Lymphatics: no lymphadenopathy    Data: (All radiographs, tracings, PFTs, and imaging are personally viewed and interpreted unless otherwise noted).    Recent Labs     06/29/21  1451 06/30/21  0516   WBC 12.8* 11.6*   HGB 11.8* 9.7*   HCT 37.5 31.2*   * 321      Recent Labs     06/29/21  1451 06/30/21  0516    139   K 3.7 3.8    107   CO2 21* 22*   BUN 5* 5*   CREATININE 0.7 0.5   CALCIUM 9.1 8.1*      Recent Labs     06/29/21  1451   AST 11   ALT 12   BILITOT 0.9   ALKPHOS 160*       No results for input(s): INR in the last 72 hours.  No results for input(s): Sol Mary Ellen in the last 72 hours. Radiology reports-   CT ABDOMEN PELVIS W IV CONTRAST Additional Contrast? Oral    Result Date: 6/29/2021  PROCEDURE: CT ABDOMEN PELVIS W IV CONTRAST CLINICAL INFORMATION: history of crohn's; abdominal pain . COMPARISON: No prior study. TECHNIQUE: 2-D multiplanar postcontrast images of the abdomen and pelvis Isovue-370 IV and Omnipaque 240 oral contrast All CT scans at this facility use dose modulation, iterative reconstruction, and/or weight-based dosing when appropriate to reduce radiation dose to as low as reasonably achievable. FINDINGS: Abdomen pelvis Pelvis Markedly thickened and edematous distal ileum including the terminal ileum at least the last 10 cm of the ileum is diffusely thickened and edematous. Measures up to 2.4 cm at the cecum. There is no obstruction. There is a small amount of free fluid in the pelvis. Urinary bladder is unremarkable.  Liver, spleen, and pancreas are within normal limits. Gallbladder is contracted. Adrenals and kidneys appear normal. Aorta is unremarkable. Prominent periuterine vessels. No free air is seen. Lung bases Lung bases are clear undersurface of the heart is unremarkable. Markedly thickened edematous distal ileum including the terminal ileum highly suggestive of active inflammatory bowel disease. **This report has been created using voice recognition software. It may contain minor errors which are inherent in voice recognition technology. ** Final report electronically signed by Dr. Young Ruth on 6/29/2021 4:16 PM      Electronically signed by Quan Harmon DO on 6/30/2021 at 1:12 PM

## 2021-06-30 NOTE — FLOWSHEET NOTE
06/29/21 2310   Provider Notification   Reason for Communication Review case   Provider Name Loren Hung   Provider Notification Advance Practice Clinician (CNS/NP/CNM/CRNA/PA)   Method of Communication Secure Message   Response See orders   Notification Time [phone]   Patient here for a crohn's flare. VS 88/53 (63), Hr 76. Patient is asymptomatic but she is now having diarrhea. She is on a clear liquid diet but not much intake. Can I have an order for continuous fluids and maybe a bolus.

## 2021-06-30 NOTE — CONSULTS
Consult History & Physical      Patient:  James Arechiga  YOB: 1990  MRN: [id number]     Acct: [de-identified]    Chief Complaint:    Chief Complaint   Patient presents with    Abdominal Pain       Date of Service: Pt seen/examined in consultation on 6/30/2021    History Of Present Illness:      31 y.o. female who we are asked to see/evaluate by Raymond Garcia DO for medical management of Crohn's flare. She came to the ED yesterday for abdominal pain, nausea, vomiting, and diarrhea that started approximately 2 weeks ago. Her pain is mostly lower bilaterally and is continuous. It is worse with eating, does not matter what she eats. A heating pad sometimes helps the pain. Denies hematemesis. She no longer feels nauseous. She is tolerating a clear liquid diet without increased pain or nausea. She denies melena and hematochezia. Denies fever and chills. CT A/P demonstrated markedly thickened edematous distal ileum including the terminal ileum highly suggestive of active inflammatory bowel disease. Initial WBC 12.8. Afebrile since admission. She was started on Humira in 2017 with good control of the Crohn's. However she lost her insurance and went off the Humira in 02/2021. She was recently given a couple samples of Humira and was recently approved with a prior authorization for Humira for one year. She states her Humira is supposed to be delivered 07/09/21. She was started on Prednisone 20mg daily a couple months ago until she could get restarted on the Humira. She states she finished the Prednisone a couple weeks ago. Last colonoscopy 2017 demonstrated some erosions and friability to the terminal ileum, patchy focal erythema in the distal sigmoid/rectum, otherwise unremarkable. Pathology demonstrated acute & chronic ileitis.  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 Past Medical History:    Past Medical History:   Diagnosis Date    Crohn's colitis ([address]        Home Medications:  Prior to Admission medications    Medication Sig Start Date End Date Taking? Authorizing Provider   ibuprofen (IBU) 800 MG tablet Take 1 tablet by mouth every 8 hours as needed for Pain 10/14/18   Selina Solorio MD   adalimumab (HUMIRA) 40 MG/0.8ML injection Inject 40 mg into the skin once    Historical Provider, MD   Prenatal Vit-Fe Fumarate-FA (PRENATAL 19 PO) Take by mouth    Historical Provider, MD       Surgical History:  Past Surgical History:   Procedure Laterality Date    [place]  DELIVERY ONLY N/A 10/12/2018     SECTION performed by Nay Ramesh MD at [address] [place]         Family History:  Family History   Problem Relation Age of Onset    Other Mother        Past GI History:  Crohn's disease, esophageal dilatation, EGD, colonoscopy  Dr. Giovanni Chakraborty patient    Allergies:  Duricef [cefadroxil] and Penicillins    Social History:   TOBACCO:   reports that she has never smoked. She has never used smokeless tobacco.  ETOH:   reports no history of alcohol use. Review Of Systems  GENERAL: No fever, chills or weight loss. EYES:  No  blurred vision, double vision   CARDIOVASCULAR: No chest pain or palpitations. RESPIRATORY:  No dyspnea or cough. GI:  See HPI  MUSCULOSKELETAL: No new painful or swollen joints or myalgias. :   No dysuria or hematuria. SKIN:  No rashes or jaundice. NEUROLOGIC:  No headaches or seizures, numbness or tingling of arms, or legs. PSYCH:  No anxiety or depression. ENDOCRINE:  No polyuria or polydipsia.     BLOOD:  +anemia      PHYSICAL EXAM:  BP (!) 101/56   Pulse 76   Temp 97.5 °F (36.4 °C) (Oral)   Resp 15   Ht 4' 11\" (1.499 m)   Wt 89 lb 12.8 oz (40.7 kg)   SpO2 100%   Breastfeeding No   BMI 18.14 kg/m²     General appearance: No apparent distress, appears stated age and cooperative. HEENT: Normal cephalic, atraumatic without obvious deformity. Pupils equal, round, and reactive to light. Neck: Supple, with full range of motion. No jugular venous distention. Trachea midline. Respiratory:  Normal respiratory effort. Clear to auscultation, bilaterally without Rales/Wheezes/Rhonchi. Cardiovascular: Regular rate and rhythm without murmurs, rubs or gallops. Abdomen: Soft, tender throughout worse to the lower abdomen with palpation, mildly distended with active bowel sounds. Musculoskeletal: No clubbing, cyanosis or edema bilaterally. Skin: Pink, warm, dry. No rashes or lesions. Psychiatric: Alert and oriented, thought content appropriate, normal insight    Labs:   Recent Labs     06/30/21  0516   WBC 11.6*   HGB 9.7*   HCT 31.2*        Recent Labs     06/30/21  0516      K 3.8      CO2 22*   BUN 5*   CREATININE 0.5   CALCIUM 8.1*     Recent Labs     06/29/21  1451   AST 11   ALT 12   BILITOT 0.9   ALKPHOS 160*     Radiology:   CT abdomen/pelvis 06/29/21      Impression   Markedly thickened edematous distal ileum including the terminal ileum highly suggestive of active inflammatory bowel disease. Code Status: Full Code    ASSESSMENT:  1. Crohn's exacerbation  2. Lower abdominal pain secondary to #2  3. Nausea & vomiting secondary to #2- improving  4. Diarrhea secondary to #2  5. Leukocytosis- trending down  6.  Chronic anemia- no GI bleeding noted    PLAN:    Monitor H & H, transfuse prn  Strict I & O  Heat as needed  Stop IV narcotic analgesics, not recommended in the treatment of Crohn's exacerbation due to chronicity of disease and risk for addiction as well as masking symptom improvement  Hyoscyamine prn  Continue Antiemetics prn  IVF  Advance to full liquid diet  Stool panel  Continue IV Cipro & Flagyl for now  Hold Prednisone  Supportive care per primary team  Will follow       Case reviewed and impression/plan reviewed in collaboration with Dr. Hina Collier  Electronically signed by PRAVEENA Tucker CNP on 6/30/2021 at 10:37 AM    [facility]  Thank you for the consultation.

## 2021-07-01 ENCOUNTER — APPOINTMENT (OUTPATIENT)
Dept: GENERAL RADIOLOGY | Age: 31
DRG: 387 | End: 2021-07-01
Payer: COMMERCIAL

## 2021-07-01 PROCEDURE — 6370000000 HC RX 637 (ALT 250 FOR IP): Performed by: FAMILY MEDICINE

## 2021-07-01 PROCEDURE — 6360000002 HC RX W HCPCS: Performed by: FAMILY MEDICINE

## 2021-07-01 PROCEDURE — 74018 RADEX ABDOMEN 1 VIEW: CPT

## 2021-07-01 PROCEDURE — 1200000003 HC TELEMETRY R&B

## 2021-07-01 PROCEDURE — 6360000002 HC RX W HCPCS: Performed by: NURSE PRACTITIONER

## 2021-07-01 PROCEDURE — 2580000003 HC RX 258: Performed by: FAMILY MEDICINE

## 2021-07-01 PROCEDURE — 99232 SBSQ HOSP IP/OBS MODERATE 35: CPT | Performed by: NURSE PRACTITIONER

## 2021-07-01 PROCEDURE — 6370000000 HC RX 637 (ALT 250 FOR IP): Performed by: NURSE PRACTITIONER

## 2021-07-01 PROCEDURE — 6360000002 HC RX W HCPCS: Performed by: INTERNAL MEDICINE

## 2021-07-01 RX ORDER — METHYLPREDNISOLONE SODIUM SUCCINATE 125 MG/2ML
60 INJECTION, POWDER, LYOPHILIZED, FOR SOLUTION INTRAMUSCULAR; INTRAVENOUS EVERY 12 HOURS
Status: DISCONTINUED | OUTPATIENT
Start: 2021-07-01 | End: 2021-07-03

## 2021-07-01 RX ADMIN — SODIUM CHLORIDE, PRESERVATIVE FREE 10 ML: 5 INJECTION INTRAVENOUS at 20:50

## 2021-07-01 RX ADMIN — HYDROMORPHONE HYDROCHLORIDE 0.5 MG: 1 INJECTION, SOLUTION INTRAMUSCULAR; INTRAVENOUS; SUBCUTANEOUS at 11:02

## 2021-07-01 RX ADMIN — HYOSCYAMINE SULFATE 125 MCG: 0.12 TABLET, ORALLY DISINTEGRATING ORAL at 07:44

## 2021-07-01 RX ADMIN — METRONIDAZOLE 500 MG: 500 TABLET ORAL at 00:47

## 2021-07-01 RX ADMIN — METHYLPREDNISOLONE SODIUM SUCCINATE 60 MG: 125 INJECTION, POWDER, FOR SOLUTION INTRAMUSCULAR; INTRAVENOUS at 14:58

## 2021-07-01 RX ADMIN — HYOSCYAMINE SULFATE 125 MCG: 0.12 TABLET, ORALLY DISINTEGRATING ORAL at 14:54

## 2021-07-01 RX ADMIN — ONDANSETRON 4 MG: 2 INJECTION INTRAMUSCULAR; INTRAVENOUS at 11:02

## 2021-07-01 RX ADMIN — HYDROMORPHONE HYDROCHLORIDE 0.5 MG: 1 INJECTION, SOLUTION INTRAMUSCULAR; INTRAVENOUS; SUBCUTANEOUS at 16:52

## 2021-07-01 RX ADMIN — SODIUM CHLORIDE: 9 INJECTION, SOLUTION INTRAVENOUS at 14:55

## 2021-07-01 RX ADMIN — HYOSCYAMINE SULFATE 125 MCG: 0.12 TABLET, ORALLY DISINTEGRATING ORAL at 20:50

## 2021-07-01 RX ADMIN — MORPHINE SULFATE 2 MG: 2 INJECTION, SOLUTION INTRAMUSCULAR; INTRAVENOUS at 03:06

## 2021-07-01 RX ADMIN — HYDROMORPHONE HYDROCHLORIDE 0.5 MG: 1 INJECTION, SOLUTION INTRAMUSCULAR; INTRAVENOUS; SUBCUTANEOUS at 20:50

## 2021-07-01 RX ADMIN — METRONIDAZOLE 500 MG: 500 TABLET ORAL at 06:22

## 2021-07-01 RX ADMIN — CIPROFLOXACIN 400 MG: 2 INJECTION, SOLUTION INTRAVENOUS at 06:22

## 2021-07-01 RX ADMIN — SODIUM CHLORIDE: 9 INJECTION, SOLUTION INTRAVENOUS at 07:26

## 2021-07-01 RX ADMIN — MORPHINE SULFATE 2 MG: 2 INJECTION, SOLUTION INTRAMUSCULAR; INTRAVENOUS at 07:38

## 2021-07-01 ASSESSMENT — PAIN SCALES - GENERAL
PAINLEVEL_OUTOF10: 9
PAINLEVEL_OUTOF10: 7
PAINLEVEL_OUTOF10: 7
PAINLEVEL_OUTOF10: 8
PAINLEVEL_OUTOF10: 6
PAINLEVEL_OUTOF10: 5
PAINLEVEL_OUTOF10: 8
PAINLEVEL_OUTOF10: 9
PAINLEVEL_OUTOF10: 9
PAINLEVEL_OUTOF10: 8
PAINLEVEL_OUTOF10: 7
PAINLEVEL_OUTOF10: 7

## 2021-07-01 ASSESSMENT — PAIN DESCRIPTION - DESCRIPTORS
DESCRIPTORS: SHARP
DESCRIPTORS: SHARP

## 2021-07-01 ASSESSMENT — PAIN DESCRIPTION - PAIN TYPE
TYPE: ACUTE PAIN

## 2021-07-01 ASSESSMENT — PAIN DESCRIPTION - LOCATION
LOCATION: ABDOMEN

## 2021-07-01 ASSESSMENT — PAIN DESCRIPTION - FREQUENCY: FREQUENCY: INTERMITTENT

## 2021-07-01 ASSESSMENT — PAIN - FUNCTIONAL ASSESSMENT: PAIN_FUNCTIONAL_ASSESSMENT: ACTIVITIES ARE NOT PREVENTED

## 2021-07-01 ASSESSMENT — PAIN DESCRIPTION - ORIENTATION
ORIENTATION: RIGHT;MID;LOWER
ORIENTATION: LOWER
ORIENTATION: RIGHT;MID;LOWER

## 2021-07-01 ASSESSMENT — PAIN DESCRIPTION - ONSET: ONSET: ON-GOING

## 2021-07-01 ASSESSMENT — PAIN DESCRIPTION - PROGRESSION: CLINICAL_PROGRESSION: NOT CHANGED

## 2021-07-01 NOTE — PROGRESS NOTES
Hospitalist Progress Note    Patient:  Franck Carr      Unit/Bed:6K-20/020-A    YOB: 1990    MRN: [id number]       Acct: [de-identified]     PCP: Carina Vasquez MD    Date of Admission: 6/29/2021    Assessment/Plan:    1. Acute Crohn's exacerbation--appreciate GI input~recommend to stop IV narcotic analgesics as not recommended in the treatment of Crohn's exacerbation due to chronicity of disease and risk for addiction as well as masking symptom improvement however when I evaluated the patient and spouse in the room stating Dulce Tuttle has never had pain like this before\" so I did add Dilaudid via moderate to severe pain protocol and contacted Flor Rosas from [facility] and again she highly recommends no narcotics; Cipro, Flagyl 6/29; prednisone held per GI; KUB checked this morning secondary to patient complaining of increased abdominal pain that revealed nonobstructive bowel gas pattern; CRP elevated at 8.72; patient was on Humira since 2017 and unfortunately lost her insurance February of this year and has been off Humira since however received prior authorization for Humira will be delivered to the patient next week; has K pad in place  2. Lower abdominal pain--likely secondary to #1  3. Nausea, vomiting and diarrhea--likely secondary to #1; GI bacterial pathogen was negative  4. Leukocytosis--check in the morning, afebrile  5. Chronic normocytic anemia--check in the morning    Expected discharge date: Pending clinical course    Disposition:    [x] Home       [] TCU       [] Rehab       [] Psych       [] SNF       [] [place]       [] Other-    Chief Complaint: Abdominal pain    Hospital Course: Per progress note dated 6/30: \"\"She came to the ED yesterday for abdominal pain, nausea, vomiting, and diarrhea that started approximately 2 weeks ago. Her pain is mostly lower bilaterally and is continuous. It is worse with eating, does not matter what she eats.  A heating pad sometimes helps the pain. Denies hematemesis. She no longer feels nauseous. She is tolerating a clear liquid diet without increased pain or nausea. She denies melena and hematochezia. Denies fever and chills. CT A/P demonstrated markedly thickened edematous distal ileum including the terminal ileum highly suggestive of active inflammatory bowel disease. Initial WBC 12.8. Afebrile since admission. She was started on Humira in 2017 with good control of the Crohn's. However she lost her insurance and went off the Humira in 02/2021. She was recently given a couple samples of Humira and was recently approved with a prior authorization for Humira for one year. She states her Humira is supposed to be delivered 07/09/21. She was started on Prednisone 20mg daily a couple months ago until she could get restarted on the Humira. She states she finished the Prednisone a couple weeks ago. Last colonoscopy 2017 demonstrated some erosions and friability to the terminal ileum, patchy focal erythema in the distal sigmoid/rectum, otherwise unremarkable. Pathology demonstrated acute & chronic ileitis.  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, successful dilatation of 48 Fr dilator\"    7/1--> I received a call from the nurse early this morning regarding increased abdominal pain and distention so I ordered a KUB that did not reveal an obstruction; when I went to evaluate the patient the spouse was at the bedside and stated Antonio Orantes is never had pain like this before\" so I changed her Dilaudid to moderate to severe protocol and [place] [place] with GI and again highly recommends against narcotics and will be around to see the patient; she is hemodynamically stable, white count improved as of yesterday    Subjective (past 24 hours): Right lower quadrant across to lower mid abdominal area rates 10 out of 10 along with some nausea      Medications:  Reviewed    Infusion Medications    sodium chloride 25 mL (06/29/21 1826)    sodium chloride 125 mL/hr at 06/30/21 2242     Scheduled Medications    sodium chloride flush  5-40 mL Intravenous 2 times per day    enoxaparin  40 mg Subcutaneous Daily    ciprofloxacin  400 mg Intravenous Q12H    metroNIDAZOLE  500 mg Oral 3 times per day    [Held by provider] predniSONE  40 mg Oral Daily     PRN Meds: hyoscyamine, morphine, sodium chloride flush, sodium chloride, ondansetron **OR** ondansetron, polyethylene glycol, acetaminophen **OR** acetaminophen      Intake/Output Summary (Last 24 hours) at 7/1/2021 0720  Last data filed at 6/30/2021 2309  Gross per 24 hour   Intake 4475.85 ml   Output 0 ml   Net 4475.85 ml       Diet:  ADULT DIET; Full Liquid    Exam:  /63   Pulse 66   Temp 98.3 °F (36.8 °C) (Oral)   Resp 18   Ht 4' 11\" (1.499 m)   Wt 94 lb 3.2 oz (42.7 kg)   SpO2 99%   Breastfeeding No   BMI 19.03 kg/m²     General appearance: No apparent distress, appears stated age and cooperative. HEENT: Pupils equal, round, and reactive to light. Conjunctivae/corneas clear. Neck: Supple, with full range of motion. No jugular venous distention. Trachea midline. Respiratory:  Normal respiratory effort. Clear to auscultation, bilaterally without Rales/Wheezes/Rhonchi. Cardiovascular: Regular rate and rhythm with normal S1/S2 without murmurs, rubs or gallops. Abdomen: Soft, tender right lower quadrant across the lower mid abdominal area, non-distended with normal bowel sounds. Musculoskeletal: passive and active ROM x 4 extremities. Skin: Skin color, texture, turgor normal.    Neurologic:  Neurovascularly intact without any focal sensory/motor deficits.  Cranial nerves: II-XII intact, grossly non-focal.  Psychiatric: Alert and oriented, thought content appropriate  Capillary Refill: Brisk,< 3 seconds   Peripheral Pulses: +2 palpable, equal bilaterally       Labs:   Recent Labs     06/29/21  1451 06/30/21  0516   WBC 12.8* 11.6*   HGB 11.8* 9.7*   HCT 37.5 31.2*   * 321     Recent Labs     06/29/21  1451 06/30/21  0516    139   K 3.7 3.8    107   CO2 21* 22*   BUN 5* 5*   CREATININE 0.7 0.5   CALCIUM 9.1 8.1*     Recent Labs     06/29/21  1451   AST 11   ALT 12   BILITOT 0.9   ALKPHOS 160*       Recent Labs     06/29/21  1451   PROCAL < 0.02     Microbiology:    GI bacterial pathogen negative    Urinalysis:      Lab Results   Component Value Date    NITRU NEGATIVE 06/29/2021    WBCUA 0-2 06/29/2021    BACTERIA NONE SEEN 06/29/2021    RBCUA 10-15 06/29/2021    BLOODU LARGE 06/29/2021    GLUCOSEU NEGATIVE 06/29/2021       Radiology:  CT ABDOMEN PELVIS W IV CONTRAST Additional Contrast? Oral    Result Date: 6/29/2021  PROCEDURE: CT ABDOMEN PELVIS W IV CONTRAST CLINICAL INFORMATION: history of crohn's; abdominal pain . COMPARISON: No prior study. TECHNIQUE: 2-D multiplanar postcontrast images of the abdomen and pelvis Isovue-370 IV and Omnipaque 240 oral contrast All CT scans at this facility use dose modulation, iterative reconstruction, and/or weight-based dosing when appropriate to reduce radiation dose to as low as reasonably achievable. FINDINGS: Abdomen pelvis Pelvis Markedly thickened and edematous distal ileum including the terminal ileum at least the last 10 cm of the ileum is diffusely thickened and edematous. Measures up to 2.4 cm at the cecum. There is no obstruction. There is a small amount of free fluid in the pelvis. Urinary bladder is unremarkable. Liver, spleen, and pancreas are within normal limits. Gallbladder is contracted. Adrenals and kidneys appear normal. Aorta is unremarkable. Prominent periuterine vessels. No free air is seen. Lung bases Lung bases are clear undersurface of the heart is unremarkable.      Markedly thickened edematous distal ileum including the terminal ileum highly suggestive of active inflammatory bowel disease. **This report has been created using voice recognition software. It may contain minor errors which are inherent in voice recognition technology. ** Final report electronically signed by Dr. Buzz Gr on 6/29/2021 4:16 PM      DVT prophylaxis: [x] Lovenox                                 [] SCDs                                 [] SQ Heparin                                 [] Encourage ambulation           [] Already on Anticoagulation     Code Status: Full Code    Tele:   [] yes             [x] no    Active Hospital Problems    Diagnosis Date Noted    [facility] [G73.47]     Abdominal pain [R10.9] 06/29/2021       Electronically signed by PRAVEENA Lerma CNP on 7/1/2021 at 7:20 AM

## 2021-07-01 NOTE — CARE COORDINATION
7/1/21, 12:03 PM EDT    DISCHARGE ON GOING [address] day: 2  Location: -20/020-A Reason for admit: Abdominal pain [R10.9]   Procedure: none  Barriers to Discharge: Continue IV ATBs, IVF, and pain control. Advanced to [facility] diet. PCP: Danielle Garces MD  Readmission Risk Score: 7%  Patient Goals/Plan/Treatment Preferences: Home with . Denies needs.

## 2021-07-01 NOTE — PROGRESS NOTES
Message sent to [facility]. Patient is having 9/10abdominal pain. No relief with the morphine or the Oscimin.

## 2021-07-01 NOTE — PROGRESS NOTES
deferred    Labs:   CBC:   Lab Results   Component Value Date    WBC 11.6 06/30/2021    HGB 9.7 06/30/2021    HCT 31.2 06/30/2021    MCV 83.0 06/30/2021     06/30/2021     BMP:   Lab Results   Component Value Date     06/30/2021    K 3.8 06/30/2021     06/30/2021    CO2 22 06/30/2021    BUN 5 06/30/2021    CREATININE 0.5 06/30/2021    CALCIUM 8.1 06/30/2021     Lipids:   Lab Results   Component Value Date    ALKPHOS 160 06/29/2021    ALT 12 06/29/2021    AST 11 06/29/2021    BILITOT 0.9 06/29/2021    LABALBU 3.6 06/29/2021    LIPASE 23.2 06/29/2021      Ref.  Range 6/30/2021 13:30   Campylobacter PCR Latest Ref Range: Not Detected  Not Detected   Yersinia Enterocolitica PCR Latest Ref Range: Not Detected  Not Detected   Cryptosporidium PCR Latest Ref Range: Not Detected  Not Detected   Clostridium difficile, PCR Latest Ref Range: Not Detected  Not Detected   Giardia Lamblia PCR Latest Ref Range: Not Detected  Not Detected   Adenovirus F 40 41 PCR Latest Ref Range: Not Detected  Not Detected   E Coli O157 PCR Latest Ref Range: Not Detected  NA   E Coli Enteroaggregative PCR Latest Ref Range: Not Detected  Not Detected   E Coli Enteropathogenic PCR Latest Ref Range: Not Detected  Not Detected   E Coli Enterotoxigenic PCR Latest Ref Range: Not Detected  Not Detected   E Coli Shiga Like Toxin PCR Latest Ref Range: Not Detected  Not Detected   E Coli Shigella/Enteroinvasive PCR Latest Ref Range: Not Detected  Not Detected   Norovirus GI GII PCR Latest Ref Range: Not Detected  Not Detected   Plesiomonas Shigelloides PCR Latest Ref Range: Not Detected  Not Detected   Rotavirus A PCR Latest Ref Range: Not Detected  Not Detected   Salmonella PCR Latest Ref Range: Not Detected  Not Detected   Sapovirus PCR Latest Ref Range: Not Detected  Not Detected   Vibrio Cholerae PCR Latest Ref Range: Not Detected  Not Detected   Vibrio PCR Latest Ref Range: Not Detected  Not Detected   Cyclospora Cayetanensis PCR Latest Ref Range: Not Detected  Not Detected   E HISTOLYTICA GI FILM ARRAY Latest Ref Range: Not Detected  Not Detected   Astrovirus PCR Latest Ref Range: Not Detected  Not Detected     Significant Diagnostic Studies:   KUB 07/01/21      Impression       Nonobstructive bowel gas pattern. Current Meds:  Scheduled Meds:   [START ON 7/2/2021] enoxaparin  30 mg Subcutaneous Daily    methylPREDNISolone  60 mg Intravenous Q12H    sodium chloride flush  5-40 mL Intravenous 2 times per day     Continuous Infusions:   sodium chloride 25 mL (06/29/21 1826)    sodium chloride 125 mL/hr at 07/01/21 0726       Assessment:  32 yo F admitted 06/29/21 for abd pain n/v, diarrhea. Denies fever & chills. CT A/P demonstrated markedly thickened edematous distal ileum including the terminal ileum highly suggestive of active inflammatory bowel disease. Initial WBC 12.8. Afebrile. Stool sample negative. KUB negative. H/O Crohn's disease, started on Humira 2017. She lost her insurance 02/2021 & had been off Humira. Recently received prior authorization for Humira, will be delivered to patient next week. 1. Crohn's exacerbation  2. Lower abdominal pain secondary to #2  3. Nausea & vomiting secondary to #2- improving  4. Diarrhea secondary to #2  5. Leukocytosis- trending down  6. Chronic anemia- no GI bleeding noted       Plan:    Monitor H & H, transfuse prn  Strict I & O  Heat as needed  Lengthy discussion had regarding Crohn's exacerbation, treatment, length of treatment, and plan of care. Lengthy discussion had regarding narcotic analgesics in the setting of Crohn's disease and exacerbation. RN present during evaluation.    Continue Hyoscyamine prn  Continue Antiemetics prn  IVF  Diet as tolerated  Stop Cipro & Flagyl  IV Solumedrol BID  Case discussed with primary  Supportive care per primary team  Will follow    Case reviewed and impression/plan reviewed in collaboration with Dr. Leila Denis  Electronically signed by Lacie Wright Woody Oliver - CNP on 7/1/2021 at 12:34 PM    [facility]     Approximately 40 minutes spent doing the following: Examination, patient evaluation, 24 chart review done; all tests, results, and plan of care reviewed with the patient; all questions answered. Greater than 50% of the time was spent counseling and educated the patient.

## 2021-07-01 NOTE — PLAN OF CARE
Problem: Safety:  Goal: Free from accidental physical injury  Description: Free from accidental physical injury  Outcome: Ongoing  Goal: Free from intentional harm  Description: Free from intentional harm  Outcome: Ongoing     Problem: Daily Care:  Goal: Daily care needs are met  Description: Daily care needs are met  Outcome: Ongoing  Note: Daily needs are met. Patient has no complaints at this time. Patient satisfied. Will continue to monitor. Problem: Pain:  Goal: Patient's pain/discomfort is manageable  Description: Patient's pain/discomfort is manageable  Outcome: Ongoing  Note: Patient complaining of pain 7/10 on a scale of 0-10. PRN pain medication given, see MAR. Patient repositioned and rest is encouraged. Will continue to monitor. Goal: Pain level will decrease  Description: Pain level will decrease  Outcome: Ongoing  Goal: Control of acute pain  Description: Control of acute pain  Outcome: Ongoing  Goal: Control of chronic pain  Description: Control of chronic pain  Outcome: Ongoing     Problem: Skin Integrity:  Goal: Skin integrity will stabilize  Description: Skin integrity will stabilize  Outcome: Ongoing  Note: No new skin breakdown noted. Encouraged patient to reposition in bed. Will continue to monitor. Problem: Discharge Planning:  Goal: Patients continuum of care needs are met  Description: Patients continuum of care needs are met  Outcome: Ongoing  Note: Patient needs are met. Patient satisfied. No further needs at this time. Will continue to monitor. Problem: Discharge Planning:  Goal: Participates in care planning  Description: Participates in care planning  Outcome: Ongoing  Goal: Discharged to appropriate level of care  Description: Discharged to appropriate level of care  Outcome: Ongoing  Note: Patient plans to be discharged to home. Will continue to reassess needs. Problem:  Bowel Function - Altered:  Goal: Bowel elimination is within specified parameters  Description: Bowel elimination is within specified parameters  Outcome: Ongoing     Care plan reviewed with patient and. Patient verbalize understanding of the plan of care and contribute to goal setting.

## 2021-07-02 LAB
ALBUMIN SERPL-MCNC: 2.6 G/DL (ref 3.5–5.1)
ALP BLD-CCNC: 85 U/L (ref 38–126)
ALT SERPL-CCNC: 8 U/L (ref 11–66)
ANION GAP SERPL CALCULATED.3IONS-SCNC: 10 MEQ/L (ref 8–16)
AST SERPL-CCNC: 8 U/L (ref 5–40)
BILIRUB SERPL-MCNC: 0.4 MG/DL (ref 0.3–1.2)
BUN BLDV-MCNC: 5 MG/DL (ref 7–22)
CALCIUM SERPL-MCNC: 8.4 MG/DL (ref 8.5–10.5)
CHLORIDE BLD-SCNC: 110 MEQ/L (ref 98–111)
CO2: 24 MEQ/L (ref 23–33)
CREAT SERPL-MCNC: 0.5 MG/DL (ref 0.4–1.2)
ERYTHROCYTE [DISTWIDTH] IN BLOOD BY AUTOMATED COUNT: 14.8 % (ref 11.5–14.5)
ERYTHROCYTE [DISTWIDTH] IN BLOOD BY AUTOMATED COUNT: 45.1 FL (ref 35–45)
GFR SERPL CREATININE-BSD FRML MDRD: > 90 ML/MIN/1.73M2
GLUCOSE BLD-MCNC: 108 MG/DL (ref 70–108)
HCT VFR BLD CALC: 32.7 % (ref 37–47)
HEMOGLOBIN: 10 GM/DL (ref 12–16)
MCH RBC QN AUTO: 25.4 PG (ref 26–33)
MCHC RBC AUTO-ENTMCNC: 30.6 GM/DL (ref 32.2–35.5)
MCV RBC AUTO: 83.2 FL (ref 81–99)
PLATELET # BLD: 365 THOU/MM3 (ref 130–400)
PMV BLD AUTO: 9.1 FL (ref 9.4–12.4)
POTASSIUM REFLEX MAGNESIUM: 3.9 MEQ/L (ref 3.5–5.2)
RBC # BLD: 3.93 MILL/MM3 (ref 4.2–5.4)
SODIUM BLD-SCNC: 144 MEQ/L (ref 135–145)
TOTAL PROTEIN: 5.2 G/DL (ref 6.1–8)
WBC # BLD: 13.8 THOU/MM3 (ref 4.8–10.8)

## 2021-07-02 PROCEDURE — 6360000002 HC RX W HCPCS: Performed by: NURSE PRACTITIONER

## 2021-07-02 PROCEDURE — 36415 COLL VENOUS BLD VENIPUNCTURE: CPT

## 2021-07-02 PROCEDURE — 85027 COMPLETE CBC AUTOMATED: CPT

## 2021-07-02 PROCEDURE — 80053 COMPREHEN METABOLIC PANEL: CPT

## 2021-07-02 PROCEDURE — 2580000003 HC RX 258: Performed by: NURSE PRACTITIONER

## 2021-07-02 PROCEDURE — 6370000000 HC RX 637 (ALT 250 FOR IP): Performed by: NURSE PRACTITIONER

## 2021-07-02 PROCEDURE — 2580000003 HC RX 258: Performed by: FAMILY MEDICINE

## 2021-07-02 PROCEDURE — 1200000003 HC TELEMETRY R&B

## 2021-07-02 PROCEDURE — 99232 SBSQ HOSP IP/OBS MODERATE 35: CPT | Performed by: NURSE PRACTITIONER

## 2021-07-02 RX ADMIN — SODIUM CHLORIDE: 9 INJECTION, SOLUTION INTRAVENOUS at 00:31

## 2021-07-02 RX ADMIN — METHYLPREDNISOLONE SODIUM SUCCINATE 60 MG: 125 INJECTION, POWDER, FOR SOLUTION INTRAMUSCULAR; INTRAVENOUS at 04:13

## 2021-07-02 RX ADMIN — SODIUM CHLORIDE, PRESERVATIVE FREE 10 ML: 5 INJECTION INTRAVENOUS at 19:40

## 2021-07-02 RX ADMIN — HYOSCYAMINE SULFATE 125 MCG: 0.12 TABLET, ORALLY DISINTEGRATING ORAL at 17:03

## 2021-07-02 RX ADMIN — HYDROMORPHONE HYDROCHLORIDE 0.5 MG: 1 INJECTION, SOLUTION INTRAMUSCULAR; INTRAVENOUS; SUBCUTANEOUS at 19:40

## 2021-07-02 RX ADMIN — SODIUM CHLORIDE: 9 INJECTION, SOLUTION INTRAVENOUS at 08:09

## 2021-07-02 RX ADMIN — SODIUM CHLORIDE: 9 INJECTION, SOLUTION INTRAVENOUS at 15:27

## 2021-07-02 RX ADMIN — METHYLPREDNISOLONE SODIUM SUCCINATE 60 MG: 125 INJECTION, POWDER, FOR SOLUTION INTRAMUSCULAR; INTRAVENOUS at 15:27

## 2021-07-02 ASSESSMENT — PAIN SCALES - GENERAL
PAINLEVEL_OUTOF10: 2
PAINLEVEL_OUTOF10: 4
PAINLEVEL_OUTOF10: 2
PAINLEVEL_OUTOF10: 7
PAINLEVEL_OUTOF10: 2

## 2021-07-02 ASSESSMENT — PAIN DESCRIPTION - LOCATION
LOCATION: ABDOMEN

## 2021-07-02 ASSESSMENT — PAIN DESCRIPTION - PAIN TYPE
TYPE: ACUTE PAIN

## 2021-07-02 ASSESSMENT — PAIN DESCRIPTION - ORIENTATION
ORIENTATION: LOWER

## 2021-07-02 ASSESSMENT — PAIN DESCRIPTION - ONSET: ONSET: ON-GOING

## 2021-07-02 NOTE — PLAN OF CARE
Problem: Safety:  Goal: Free from accidental physical injury  Description: Free from accidental physical injury  Outcome: Ongoing  Note: Pt remains free from harm at this time, will continue to assess and encourage a calm environment. Problem: Daily Care:  Goal: Daily care needs are met  Description: Daily care needs are met  Outcome: Ongoing  Note: Daily needs are met at this time, will continue to assist as needed. Problem: Pain:  Goal: Patient's pain/discomfort is manageable  Description: Patient's pain/discomfort is manageable  Outcome: Ongoing  Note: Pt is resting quietly at this time, she did get dilaudid for pain 7/10. Refer to [facility] and flowsheet. Problem: Skin Integrity:  Goal: Skin integrity will stabilize  Description: Skin integrity will stabilize  Outcome: Ongoing  Note: Pt ambulates well and turns self frequently. Bruising scattered throughout. Problem: Discharge Planning:  Goal: Patients continuum of care needs are met  Description: Patients continuum of care needs are met  Outcome: Ongoing  Note: Pt needs are met at this time, will continue to assist as needed. Problem: Discharge Planning:  Goal: Participates in care planning  Description: Participates in care planning  Outcome: Ongoing  Note: Pt is active in plan of care, continue to update as needed. Problem: Discharge Planning:  Goal: Discharged to appropriate level of care  Description: Discharged to appropriate level of care  Outcome: Ongoing  Note: Pt prefers to return home with significant other upon discharge. Problem: Bowel Function - Altered:  Goal: Bowel elimination is within specified parameters  Description: Bowel elimination is within specified parameters  Outcome: Ongoing  Note: Bowel sounds hypoactive, will continue to assess. Pt did have multiple times today. Care plan reviewed with patient. Patient verbalize understanding of the plan of care and contribute to goal setting.

## 2021-07-02 NOTE — PROGRESS NOTES
Gastroenterology Progress Note:     Patient Name:  Lorrayne Dakin   MRN: [id number]  [id number]  YOB: 1990  Admit Date: 6/29/2021  2:21 PM  Primary Care Physician: Gillian Moraes MD   6K-20/020-A     Patient seen and examined. 24 hours events and chart reviewed. Subjective: Patient resting in bed,  present. She continues to have generalized abdominal pain, but says it is improving. Denies nausea and vomiting. She is taking in some full liquids, but endorses cramping with eating. She had 2 bowel movements yesterday, no blood noted. She states the first stool was loose and the second had some form. Objective:  /61   Pulse 70   Temp 97.6 °F (36.4 °C) (Oral)   Resp 16   Ht 4' 11\" (1.499 m)   Wt 94 lb 3.2 oz (42.7 kg)   SpO2 98%   Breastfeeding No   BMI 19.03 kg/m²     Physical Exam:    General:  Nourished in no distress  HEENT: Atraumatic, normocephalic. Moist oral mucous membranes. Neck: Supple without adenopathy, JVD, thyromegaly or masses. Trachea midline. CV: Heart RRR, no murmurs, rubs, gallops. Resp: Even, easy without cough or accessory use. Lungs clear to ascultation bilaterally. Abd: Round, soft, tender throughout with palpation. No hepatosplenomegaly or mass present. Active bowel sounds heard. Mild distention noted. Ext:  Without cyanosis, clubbing, edema. Skin: Pink, warm, dry  Neuro:  Alert, oriented x 3 with no obvious deficits.        Rectal: deferred    Labs:   CBC:   Lab Results   Component Value Date    WBC 13.8 07/02/2021    HGB 10.0 07/02/2021    HCT 32.7 07/02/2021    MCV 83.2 07/02/2021     07/02/2021     BMP:   Lab Results   Component Value Date     07/02/2021    K 3.9 07/02/2021     07/02/2021    CO2 24 07/02/2021    BUN 5 07/02/2021    CREATININE 0.5 07/02/2021    CALCIUM 8.4 07/02/2021     Lipids:   Lab Results   Component Value Date    ALKPHOS 85 07/02/2021    ALT 8 07/02/2021    AST 8 07/02/2021    BILITOT 0.4 07/02/2021 LABALBU 2.6 07/02/2021    LIPASE 23.2 06/29/2021     Current Meds:  Scheduled Meds:   enoxaparin  30 mg Subcutaneous Daily    methylPREDNISolone  60 mg Intravenous Q12H    sodium chloride flush  5-40 mL Intravenous 2 times per day     Continuous Infusions:   sodium chloride 25 mL (06/29/21 1826)    sodium chloride 75 mL/hr at 07/02/21 1121       Assessment:  32 yo F admitted 06/29/21 for abd pain n/v, diarrhea. Denies fever & chills. CT A/P demonstrated markedly thickened edematous distal ileum including the terminal ileum highly suggestive of active inflammatory bowel disease. Initial WBC 12.8. Afebrile. Stool sample negative. KUB negative. H/O Crohn's disease, started on Humira 2017. She lost her insurance 02/2021 & had been off Humira. Recently received prior authorization for Humira, will be delivered to patient next week. ATBs stopped, IV Solumedrol started 07/01/21. 1. Crohn's exacerbation  2. Lower abdominal pain secondary to #2  3. Nausea & vomiting secondary to #2- improving  4. Diarrhea secondary to #2  5. Leukocytosis- likely secondary to steroid administration  6. Chronic anemia- no GI bleeding noted        Plan:    · Monitor H & H, transfuse prn  · Strict I & O  · Heat as needed  · Continue Hyoscyamine prn  · Continue Antiemetics prn  · IVF  · Full liquid diet  · Continue IVP Solumedrol BID, may consider transitioning to PO tomorrow  · Case discussed at length with primary Danny MOTLEY  · Supportive care per primary team  Will follow    Case reviewed and impression/plan reviewed in collaboration with Dr. Matilde High  Electronically signed by PRAVEENA Anglin CNP on 7/2/2021 at 12:39 PM    [facility]     If there are any questions or concerns this weekend, please call Dr. Matilde High as he is covering for [facility].

## 2021-07-02 NOTE — CARE COORDINATION
7/2/21, 7:45 AM EDT    DISCHARGE ON GOING [address] day: 3  Location: -20/020-A Reason for admit: Abdominal pain [R10.9]   Procedure: none  Barriers to Discharge: WBC 13.8. IVF, IV solumedrol bid, pain control. GI foillowing, [place] diet ordered. PCP: Juliano Arriaga MD  Readmission Risk Score: 7%  Patient Goals/Plan/Treatment Preferences: Home with . Denies needs.

## 2021-07-02 NOTE — PROGRESS NOTES
Hospitalist Progress Note    Patient:  Luh Mcclure      Unit/Bed:6K-20/020-A    YOB: 1990    MRN: [id number]       Acct: [de-identified]     PCP: Rohit Hart MD    Date of Admission: 6/29/2021    Assessment/Plan:    1. Acute Crohn's exacerbation--appreciate GI input; Cipro, Flagyl 6/29-7/1; Solu-Medrol 60 mg IV push twice a day started 7/1 with improvement in patient's pain; patient has not required any IV narcotics since 7/1 evening; CRP elevated at 8.72; patient was on Humira since 2017 and unfortunately lost her insurance February of this year and has been off Humira since however received prior authorization for Humira will be delivered to the patient next week  2. Lower abdominal pain--likely secondary to #1; improved  3. Nausea, vomiting and diarrhea--likely secondary to #1; GI bacterial pathogen was negative; improved  4. Leukocytosis-- afebrile; increase slightly but patient started on steroids 7/1  5. Chronic normocytic anemia--stable    Expected discharge date: Pending clinical course    Disposition:    [x] Home       [] TCU       [] Rehab       [] Psych       [] SNF       [] [facility]       [] Other-    Chief Complaint: Abdominal pain    Hospital Course: Per progress note dated 6/30: \"\"She came to the ED yesterday for abdominal pain, nausea, vomiting, and diarrhea that started approximately 2 weeks ago. Her pain is mostly lower bilaterally and is continuous. It is worse with eating, does not matter what she eats. A heating pad sometimes helps the pain. Denies hematemesis. She no longer feels nauseous. She is tolerating a clear liquid diet without increased pain or nausea. She denies melena and hematochezia. Denies fever and chills. CT A/P demonstrated markedly thickened edematous distal ileum including the terminal ileum highly suggestive of active inflammatory bowel disease. Initial WBC 12.8. Afebrile since admission.  She was started on Humira in 2017 with good control feels so much better today; loose stools    Medications:  Reviewed    Infusion Medications    sodium chloride 25 mL (06/29/21 1826)    sodium chloride 125 mL/hr at 07/02/21 0031     Scheduled Medications    enoxaparin  30 mg Subcutaneous Daily    methylPREDNISolone  60 mg Intravenous Q12H    sodium chloride flush  5-40 mL Intravenous 2 times per day     PRN Meds: HYDROmorphone **OR** HYDROmorphone, hyoscyamine, sodium chloride flush, sodium chloride, ondansetron **OR** ondansetron, polyethylene glycol, acetaminophen **OR** acetaminophen      Intake/Output Summary (Last 24 hours) at 7/2/2021 5546  Last data filed at 7/2/2021 0401  Gross per 24 hour   Intake 3046.71 ml   Output --   Net 3046.71 ml       Diet:  ADULT DIET; Full Liquid; Low Fat (less than or equal to 50 gm/day); Lactose-Controlled, Low Caffeine    Exam:  /63   Pulse 84   Temp 98 °F (36.7 °C) (Oral)   Resp 16   Ht 4' 11\" (1.499 m)   Wt 94 lb 3.2 oz (42.7 kg)   SpO2 100%   Breastfeeding No   BMI 19.03 kg/m²     General appearance: No apparent distress, appears stated age and cooperative. HEENT: Pupils equal, round, and reactive to light. Conjunctivae/corneas clear. Neck: Supple, with full range of motion. No jugular venous distention. Trachea midline. Respiratory:  Normal respiratory effort. Clear to auscultation, bilaterally without Rales/Wheezes/Rhonchi. Cardiovascular: Regular rate and rhythm with normal S1/S2 without murmurs, rubs or gallops. Abdomen: Soft, tender right lower quadrant across the lower mid abdominal area but much improved, non-distended with normal bowel sounds. Musculoskeletal: passive and active ROM x 4 extremities. Skin: Skin color, texture, turgor normal.    Neurologic:  Neurovascularly intact without any focal sensory/motor deficits.  Cranial nerves: II-XII intact, grossly non-focal.  Psychiatric: Alert and oriented, thought content appropriate  Capillary Refill: Brisk,< 3 seconds   Peripheral Pulses: +2 palpable, equal bilaterally       Labs:   Recent Labs     06/29/21  1451 06/30/21  0516 07/02/21  0558   WBC 12.8* 11.6* 13.8*   HGB 11.8* 9.7* 10.0*   HCT 37.5 31.2* 32.7*   * 321 365     Recent Labs     06/29/21  1451 06/30/21  0516    139   K 3.7 3.8    107   CO2 21* 22*   BUN 5* 5*   CREATININE 0.7 0.5   CALCIUM 9.1 8.1*     Recent Labs     06/29/21  1451   AST 11   ALT 12   BILITOT 0.9   ALKPHOS 160*       Recent Labs     06/29/21  1451   PROCAL < 0.02     Microbiology:    GI bacterial pathogen negative    Urinalysis:      Lab Results   Component Value Date    NITRU NEGATIVE 06/29/2021    WBCUA 0-2 06/29/2021    BACTERIA NONE SEEN 06/29/2021    RBCUA 10-15 06/29/2021    BLOODU LARGE 06/29/2021    GLUCOSEU NEGATIVE 06/29/2021       Radiology:  CT ABDOMEN PELVIS W IV CONTRAST Additional Contrast? Oral    Result Date: 6/29/2021  PROCEDURE: CT ABDOMEN PELVIS W IV CONTRAST CLINICAL INFORMATION: history of crohn's; abdominal pain . COMPARISON: No prior study. TECHNIQUE: 2-D multiplanar postcontrast images of the abdomen and pelvis Isovue-370 IV and Omnipaque 240 oral contrast All CT scans at this facility use dose modulation, iterative reconstruction, and/or weight-based dosing when appropriate to reduce radiation dose to as low as reasonably achievable. FINDINGS: Abdomen pelvis Pelvis Markedly thickened and edematous distal ileum including the terminal ileum at least the last 10 cm of the ileum is diffusely thickened and edematous. Measures up to 2.4 cm at the cecum. There is no obstruction. There is a small amount of free fluid in the pelvis. Urinary bladder is unremarkable. Liver, spleen, and pancreas are within normal limits. Gallbladder is contracted. Adrenals and kidneys appear normal. Aorta is unremarkable. Prominent periuterine vessels. No free air is seen. Lung bases Lung bases are clear undersurface of the heart is unremarkable.      Markedly thickened edematous distal ileum including the terminal ileum highly suggestive of active inflammatory bowel disease. **This report has been created using voice recognition software. It may contain minor errors which are inherent in voice recognition technology. ** Final report electronically signed by Dr. Felicia Hill on 6/29/2021 4:16 PM      DVT prophylaxis: [x] Lovenox                                 [] SCDs                                 [] SQ Heparin                                 [] Encourage ambulation           [] Already on Anticoagulation     Code Status: Full Code    Tele:   [] yes             [x] no    Active Hospital Problems    Diagnosis Date Noted    [facility] [B91.08]     Abdominal pain [R10.9] 06/29/2021       Electronically signed by PRAVEENA Gastelum CNP on 7/2/2021 at 6:33 AM

## 2021-07-03 PROCEDURE — 99232 SBSQ HOSP IP/OBS MODERATE 35: CPT | Performed by: NURSE PRACTITIONER

## 2021-07-03 PROCEDURE — 6360000002 HC RX W HCPCS: Performed by: NURSE PRACTITIONER

## 2021-07-03 PROCEDURE — 6370000000 HC RX 637 (ALT 250 FOR IP): Performed by: INTERNAL MEDICINE

## 2021-07-03 PROCEDURE — 1200000003 HC TELEMETRY R&B

## 2021-07-03 RX ORDER — HYOSCYAMINE SULFATE 0.125 MG
125 TABLET,DISINTEGRATING ORAL EVERY 4 HOURS PRN
Qty: 180 TABLET | Refills: 0 | Status: SHIPPED | OUTPATIENT
Start: 2021-07-03 | End: 2021-07-04

## 2021-07-03 RX ORDER — PREDNISONE 20 MG/1
20 TABLET ORAL 2 TIMES DAILY
Status: DISCONTINUED | OUTPATIENT
Start: 2021-07-03 | End: 2021-07-04 | Stop reason: HOSPADM

## 2021-07-03 RX ORDER — PREDNISONE 20 MG/1
20 TABLET ORAL 2 TIMES DAILY
Qty: 20 TABLET | Refills: 0 | Status: SHIPPED | OUTPATIENT
Start: 2021-07-03 | End: 2021-07-04

## 2021-07-03 RX ADMIN — METHYLPREDNISOLONE SODIUM SUCCINATE 60 MG: 125 INJECTION, POWDER, FOR SOLUTION INTRAMUSCULAR; INTRAVENOUS at 01:41

## 2021-07-03 RX ADMIN — PREDNISONE 20 MG: 20 TABLET ORAL at 20:25

## 2021-07-03 RX ADMIN — PREDNISONE 20 MG: 20 TABLET ORAL at 14:36

## 2021-07-03 ASSESSMENT — PAIN DESCRIPTION - LOCATION: LOCATION: ABDOMEN

## 2021-07-03 ASSESSMENT — PAIN DESCRIPTION - ORIENTATION: ORIENTATION: LOWER

## 2021-07-03 ASSESSMENT — PAIN SCALES - GENERAL
PAINLEVEL_OUTOF10: 0
PAINLEVEL_OUTOF10: 2

## 2021-07-03 ASSESSMENT — PAIN DESCRIPTION - PAIN TYPE: TYPE: ACUTE PAIN

## 2021-07-03 NOTE — PROGRESS NOTES
Gi Progress Note  Subjective:  CC:  Abdominal pain  Patient reports:  Feeling less pain; wants to try solid food    Diet:  ADULT DIET; Regular      Medications:  Scheduled Meds:   predniSONE  20 mg Oral BID    enoxaparin  30 mg Subcutaneous Daily    sodium chloride flush  5-40 mL Intravenous 2 times per day     Continuous Infusions:   sodium chloride 25 mL (06/29/21 1826)    sodium chloride 75 mL/hr at 07/02/21 1527     PRN Meds:HYDROmorphone **OR** HYDROmorphone, hyoscyamine, sodium chloride flush, sodium chloride, ondansetron **OR** ondansetron, polyethylene glycol, acetaminophen **OR** acetaminophen    Objective:    Lab Results   Component Value Date    WBC 13.8 07/02/2021    RBC 3.93 07/02/2021    RBC 5.27 11/08/2019    HGB 10.0 07/02/2021    HCT 32.7 07/02/2021    MCV 83.2 07/02/2021    MCH 25.4 07/02/2021    MCHC 30.6 07/02/2021    RDW 13.5 11/08/2019     07/02/2021    MPV 9.1 07/02/2021     Lab Results   Component Value Date     07/02/2021    K 3.9 07/02/2021     07/02/2021    CO2 24 07/02/2021    BUN 5 07/02/2021    CREATININE 0.5 07/02/2021    GLUCOSE 108 07/02/2021    GLUCOSE 108 11/08/2019    CALCIUM 8.4 07/02/2021     Lab Results   Component Value Date    CALCIUM 8.4 07/02/2021     No results found for: IONCA  No results found for: MG  No results found for: PHOS  No results found for: BNP  Lab Results   Component Value Date    ALKPHOS 85 07/02/2021    ALT 8 07/02/2021    AST 8 07/02/2021    PROT 5.2 07/02/2021    BILITOT 0.4 07/02/2021    LABALBU 2.6 07/02/2021     No results found for: LACTA  No results found for: AMYLASE  Lab Results   Component Value Date    LIPASE 23.2 06/29/2021     No results found for: CHOL, TRIG, HDL, LDLCALC  No results for input(s): POCGLU in the last 72 hours. No results for input(s): CKTOTAL, CKMB, TROPONINI in the last 72 hours.   No results found for: LABA1C  No results found for: INR, PROTIME  No results found for: PHART, PO2ART, SBR3PHN, TPZ9PSS,

## 2021-07-03 NOTE — PROGRESS NOTES
Hospitalist Progress Note    Patient:  Yeimi Fowler      Unit/Bed:6K-20/020-A    YOB: 1990    MRN: [id number]       Acct: [de-identified]     PCP: Mellissa Chavira MD    Date of Admission: 6/29/2021    Assessment/Plan:    1. Acute Crohn's exacerbation--appreciate GI input; Cipro, Flagyl 6/29-7/1; Solu-Medrol 60 mg IV push twice a day started 7/1 per GI with improvement in patient's pain; patient has not required any IV narcotics since 7/1 evening; CRP elevated at 8.72; patient was on Humira since 2017 and unfortunately lost her insurance February of this year and has been off Humira since however received prior authorization for Humira will be delivered to the patient next week  2. Lower abdominal pain--likely secondary to #1; improved  3. Nausea, vomiting and diarrhea--likely secondary to #1; GI bacterial pathogen was negative; much improved  4. Leukocytosis-- afebrile; increase slightly but patient started on steroids 7/1  5. Chronic normocytic anemia--stable    Expected discharge date: Pending clinical course    Disposition:    [x] Home       [] TCU       [] Rehab       [] Psych       [] SNF       [] [facility]       [] Other-    Chief Complaint: Abdominal pain    Hospital Course: Per progress note dated 6/30: \"\"She came to the ED yesterday for abdominal pain, nausea, vomiting, and diarrhea that started approximately 2 weeks ago. Her pain is mostly lower bilaterally and is continuous. It is worse with eating, does not matter what she eats. A heating pad sometimes helps the pain. Denies hematemesis. She no longer feels nauseous. She is tolerating a clear liquid diet without increased pain or nausea. She denies melena and hematochezia. Denies fever and chills. CT A/P demonstrated markedly thickened edematous distal ileum including the terminal ileum highly suggestive of active inflammatory bowel disease. Initial WBC 12.8. Afebrile since admission.  She was started on Humira in 2017 with good control of the Crohn's. However she lost her insurance and went off the Humira in 02/2021. She was recently given a couple samples of Humira and was recently approved with a prior authorization for Humira for one year. She states her Humira is supposed to be delivered 07/09/21. She was started on Prednisone 20mg daily a couple months ago until she could get restarted on the Humira. She states she finished the Prednisone a couple weeks ago. Last colonoscopy 2017 demonstrated some erosions and friability to the terminal ileum, patchy focal erythema in the distal sigmoid/rectum, otherwise unremarkable. Pathology demonstrated acute & chronic ileitis.  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, successful dilatation of 48 Fr dilator\"    7/1--> I received a call from the nurse early this morning regarding increased abdominal pain and distention so I ordered a KUB that did not reveal an obstruction; when I went to evaluate the patient the spouse was at the bedside and stated Yasemin Ojeda is never had pain like this before\" so I changed her Dilaudid to moderate to severe protocol and Lesly Lav with GI and again highly recommends against narcotics and will be around to see the patient; she is hemodynamically stable, white count improved as of yesterday    7/2--> laboratory studies are essentially normal except mild white count 13.8 however patient was started on steroids yesterday; hemodynamically stable; she looks and acts so much better today, spouse at bedside; I discussed with Rina Guillory at [facility] that patient requested to have a dose of Humira here and she does not think that is going to be approved as she is going to be receiving her shipment next week    7/3--> hemodynamically stable and afebrile; use Dilaudid 0.5 mg x 1 dose last evening; I discussed with her on trying to eat to get substance in her stomach to avoid further irritation when she gets transition to oral steroids; questioning if the patient should be placed on Protonix or Pepcid secondary to being on IV steroids however we will leave this to GI    Subjective (past 24 hours): Right lower quadrant across to lower mid abdominal area rates 2 out of 10~states is a crampy sensation; states she had 1 loose stool yesterday and then another one that seem to be forming up; she states with her abdominal cramping she is really not eating much    Medications:  Reviewed    Infusion Medications    sodium chloride 25 mL (06/29/21 1826)    sodium chloride 75 mL/hr at 07/02/21 1527     Scheduled Medications    enoxaparin  30 mg Subcutaneous Daily    methylPREDNISolone  60 mg Intravenous Q12H    sodium chloride flush  5-40 mL Intravenous 2 times per day     PRN Meds: HYDROmorphone **OR** HYDROmorphone, hyoscyamine, sodium chloride flush, sodium chloride, ondansetron **OR** ondansetron, polyethylene glycol, acetaminophen **OR** acetaminophen      Intake/Output Summary (Last 24 hours) at 7/3/2021 0603  Last data filed at 7/3/2021 0409  Gross per 24 hour   Intake 3410 ml   Output --   Net 3410 ml       Diet:  ADULT DIET; Full Liquid; Low Fat (less than or equal to 50 gm/day); Lactose-Controlled, Low Caffeine    Exam:  /66   Pulse 58   Temp 98 °F (36.7 °C) (Oral)   Resp 16   Ht 4' 11\" (1.499 m)   Wt 94 lb 3.2 oz (42.7 kg)   SpO2 98%   Breastfeeding No   BMI 19.03 kg/m²     General appearance: No apparent distress, appears stated age and cooperative. HEENT: Pupils equal, round, and reactive to light. Conjunctivae/corneas clear. Neck: Supple, with full range of motion. No jugular venous distention. Trachea midline. Respiratory:  Normal respiratory effort. Clear to auscultation, bilaterally without Rales/Wheezes/Rhonchi.   Cardiovascular: Regular rate and rhythm with normal S1/S2 without murmurs, rubs or gallops. Abdomen: Soft, tender right lower quadrant across the lower mid abdominal area but much improved, mildly distended with normal bowel sounds. Musculoskeletal: passive and active ROM x 4 extremities. Skin: Skin color, texture, turgor normal.    Neurologic:  Neurovascularly intact without any focal sensory/motor deficits. Cranial nerves: II-XII intact, grossly non-focal.  Psychiatric: Alert and oriented, thought content appropriate  Capillary Refill: Brisk,< 3 seconds   Peripheral Pulses: +2 palpable, equal bilaterally       Labs:   Recent Labs     07/02/21  0558   WBC 13.8*   HGB 10.0*   HCT 32.7*        Recent Labs     07/02/21  0558      K 3.9      CO2 24   BUN 5*   CREATININE 0.5   CALCIUM 8.4*     Recent Labs     07/02/21  0558   AST 8   ALT 8*   BILITOT 0.4   ALKPHOS 85     Microbiology:    GI bacterial pathogen negative    Urinalysis:      Lab Results   Component Value Date    NITRU NEGATIVE 06/29/2021    WBCUA 0-2 06/29/2021    BACTERIA NONE SEEN 06/29/2021    RBCUA 10-15 06/29/2021    BLOODU LARGE 06/29/2021    GLUCOSEU NEGATIVE 06/29/2021       Radiology:  CT ABDOMEN PELVIS W IV CONTRAST Additional Contrast? Oral    Result Date: 6/29/2021  PROCEDURE: CT ABDOMEN PELVIS W IV CONTRAST CLINICAL INFORMATION: history of crohn's; abdominal pain . COMPARISON: No prior study. TECHNIQUE: 2-D multiplanar postcontrast images of the abdomen and pelvis Isovue-370 IV and Omnipaque 240 oral contrast All CT scans at this facility use dose modulation, iterative reconstruction, and/or weight-based dosing when appropriate to reduce radiation dose to as low as reasonably achievable. FINDINGS: Abdomen pelvis Pelvis Markedly thickened and edematous distal ileum including the terminal ileum at least the last 10 cm of the ileum is diffusely thickened and edematous. Measures up to 2.4 cm at the cecum. There is no obstruction. There is a small amount of free fluid in the pelvis.  Urinary bladder is unremarkable. Liver, spleen, and pancreas are within normal limits. Gallbladder is contracted. Adrenals and kidneys appear normal. Aorta is unremarkable. Prominent periuterine vessels. No free air is seen. Lung bases Lung bases are clear undersurface of the heart is unremarkable. Markedly thickened edematous distal ileum including the terminal ileum highly suggestive of active inflammatory bowel disease. **This report has been created using voice recognition software. It may contain minor errors which are inherent in voice recognition technology. ** Final report electronically signed by Dr. Rick Gaona on 6/29/2021 4:16 PM      DVT prophylaxis: [x] Lovenox                                 [] SCDs                                 [] SQ Heparin                                 [] Encourage ambulation           [] Already on Anticoagulation     Code Status: Full Code    Tele:   [] yes             [x] no    Active Hospital Problems    Diagnosis Date Noted    Cro[facility]'s disease of large intestine without complication ([facility] [A56.00]     Abdominal pain [R10.9] 06/29/2021       Electronically signed by PRAVEENA Kasper CNP on 7/3/2021 at 6:03 AM

## 2021-07-04 VITALS
HEART RATE: 63 BPM | OXYGEN SATURATION: 98 % | BODY MASS INDEX: 18.99 KG/M2 | DIASTOLIC BLOOD PRESSURE: 74 MMHG | SYSTOLIC BLOOD PRESSURE: 124 MMHG | TEMPERATURE: 98.2 F | HEIGHT: 59 IN | RESPIRATION RATE: 18 BRPM | WEIGHT: 94.2 LBS

## 2021-07-04 PROCEDURE — 99239 HOSP IP/OBS DSCHRG MGMT >30: CPT | Performed by: HOSPITALIST

## 2021-07-04 PROCEDURE — 6370000000 HC RX 637 (ALT 250 FOR IP): Performed by: INTERNAL MEDICINE

## 2021-07-04 RX ORDER — PREDNISONE 20 MG/1
20 TABLET ORAL 2 TIMES DAILY
Qty: 20 TABLET | Refills: 0 | Status: ON HOLD | OUTPATIENT
Start: 2021-07-04 | End: 2021-07-19 | Stop reason: HOSPADM

## 2021-07-04 RX ORDER — HYOSCYAMINE SULFATE 0.125 MG
125 TABLET,DISINTEGRATING ORAL EVERY 4 HOURS PRN
Qty: 180 TABLET | Refills: 0 | Status: SHIPPED | OUTPATIENT
Start: 2021-07-04 | End: 2021-07-29

## 2021-07-04 RX ADMIN — PREDNISONE 20 MG: 20 TABLET ORAL at 08:17

## 2021-07-04 ASSESSMENT — PAIN SCALES - GENERAL: PAINLEVEL_OUTOF10: 0

## 2021-07-04 NOTE — DISCHARGE SUMMARY
Discharge Summary    Patient:  Mary Ann Goss  YOB: 1990    MRN: [id number]   Acct: [de-identified]    Primary Care Physician: Sumeet Landin MD    Admit date:  6/29/2021    Discharge date:   7/4/2021      Discharge Diagnoses:   <principal problem not specified>  Active Problems:    Abdominal pain    Crohn's disease of large intestine without complication ([address]  Resolved Problems:    * No resolved hospital problems. *        Admitted for: (HPI)  31 y.o. female, with a hx of Crohn's disease, who presented to [address] [place] [address] with generalized abdominal pain, intermittent for the past 2 weeks, non radiating, not related to food intake and accompanied by night sweats and chills. This morning, pain has gotten more severe, around 9-10/10, accompanied nausea but not vomiting. She denies any fever, nausea, vomiting, diarrhea, constipation, hematochezia or melena. ]     She has been on Humira for around 2 years, but had to stop last February due to a change in insurance. She was noted to be tachycardic in ED. CT abdomen/plv showed Markedly thickened edematous distal ileum including the terminal ileum highly suggestive of active inflammatory bowel disease. She was given Solumedrol x 1 and admitted under the hospitalist service. Hospital Course:  1. Acute Crohn's exacerbation--appreciate GI input; Cipro, Flagyl 6/29-7/1; Solu-Medrol 60 mg IV push twice a day started 7/1 per GI with improvement in patient's pain; patient has not required any IV narcotics since 7/1 evening; CRP elevated at 8.72; patient was on Humira since 2017 and unfortunately lost her insurance February of this year and has been off Humira since however received prior authorization for Humira will be delivered to the patient next week. Patient discharged on prednisone 20 mg daily. Patient to take this up until she sees GI on 7/14/2021.   Patient will also receive Humira shot on 7/9/2021.  2. Lower abdominal pain--likely secondary distress  Chest - clear to auscultation, no wheezes, rales or rhonchi, symmetric air entry  Heart - normal rate, regular rhythm, normal S1, S2, no murmurs, rubs, clicks or gallops  Abdomen - soft, lower abdominal tenderness, mild distention, no masses or organomegaly  Obese: No; Protuberant: No   Neurological - alert, oriented, normal speech, no focal findings or movement disorder noted  Extremities - peripheral pulses normal, no pedal edema, no clubbing or cyanosis  Skin - normal coloration and turgor, no rashes, no suspicious skin lesions noted    Procedures: None    Diagnostic Test: CBC, BMP, stool analysis for pathogens    Radiology reports as per the Radiologist  Radiology: CT ABDOMEN PELVIS W IV CONTRAST Additional Contrast? Oral    Result Date: 6/29/2021  PROCEDURE: CT ABDOMEN PELVIS W IV CONTRAST CLINICAL INFORMATION: history of crohn's; abdominal pain . COMPARISON: No prior study. TECHNIQUE: 2-D multiplanar postcontrast images of the abdomen and pelvis Isovue-370 IV and Omnipaque 240 oral contrast All CT scans at this facility use dose modulation, iterative reconstruction, and/or weight-based dosing when appropriate to reduce radiation dose to as low as reasonably achievable. FINDINGS: Abdomen pelvis Pelvis Markedly thickened and edematous distal ileum including the terminal ileum at least the last 10 cm of the ileum is diffusely thickened and edematous. Measures up to 2.4 cm at the cecum. There is no obstruction. There is a small amount of free fluid in the pelvis. Urinary bladder is unremarkable. Liver, spleen, and pancreas are within normal limits. Gallbladder is contracted. Adrenals and kidneys appear normal. Aorta is unremarkable. Prominent periuterine vessels. No free air is seen. Lung bases Lung bases are clear undersurface of the heart is unremarkable. Markedly thickened edematous distal ileum including the terminal ileum highly suggestive of active inflammatory bowel disease.  **This report has been created using voice recognition software. It may contain minor errors which are inherent in voice recognition technology. ** Final report electronically signed by Dr. Ramon Antunez on 6/29/2021 4:16 PM       Results for orders placed or performed during the hospital encounter of 06/29/21   Gastrointestinal Panel, Molecular    Specimen: Stool   Result Value Ref Range    Campylobacter PCR Not Detected Not Detected    Clostridium difficile, PCR Not Detected Not Detected    Plesiomonas Shigelloides PCR Not Detected Not Detected    Salmonella PCR Not Detected Not Detected    Vibrio PCR Not Detected Not Detected    Vibrio Cholerae PCR Not Detected Not Detected    Yersinia Enterocolitica PCR Not Detected Not Detected    E Coli Enteroaggregative PCR Not Detected Not Detected    E Coli Enteropathogenic PCR Not Detected Not Detected    E Coli Enterotoxigenic PCR Not Detected Not Detected    E Coli Shiga Like Toxin PCR Not Detected Not Detected    E Coli O157 PCR NA Not Detected    E Coli Shigella/Enteroinvasive PCR Not Detected Not Detected    Cryptosporidium PCR Not Detected Not Detected    Cyclospora Cayetanensis PCR Not Detected Not Detected    E HISTOLYTICA GI FILM ARRAY Not Detected Not Detected    Giardia Lamblia PCR Not Detected Not Detected    Adenovirus F 40 39 PCR Not Detected Not Detected    Astrovirus PCR Not Detected Not Detected    Norovirus GI GII PCR Not Detected Not Detected    Rotavirus A PCR Not Detected Not Detected    Sapovirus PCR Not Detected Not Detected   CBC auto differential   Result Value Ref Range    WBC 12.8 (H) 4.8 - 10.8 thou/mm3    RBC 4.61 4.20 - 5.40 mill/mm3    Hemoglobin 11.8 (L) 12.0 - 16.0 gm/dl    Hematocrit 37.5 37.0 - 47.0 %    MCV 81.3 81.0 - 99.0 fL    MCH 25.6 (L) 26.0 - 33.0 pg    MCHC 31.5 (L) 32.2 - 35.5 gm/dl    RDW-CV 14.4 11.5 - 14.5 %    RDW-SD 42.0 35.0 - 45.0 fL    Platelets 216 (H) 360 - 400 thou/mm3    MPV 8.7 (L) 9.4 - 12.4 fL    Seg Neutrophils 90.0 %    Lymphocytes 5.5 %    Monocytes 3.8 %    Eosinophils 0.0 %    Basophils 0.2 %    Immature Granulocytes 0.5 %    Segs Absolute 11.5 (H) 1 - 7 thou/mm3    Lymphocytes Absolute 0.7 (L) 1.0 - 4.8 thou/mm3    Monocytes Absolute 0.5 0.4 - 1.3 thou/mm3    Eosinophils Absolute 0.0 0.0 - 0.4 thou/mm3    Basophils Absolute 0.0 0.0 - 0.1 thou/mm3    Immature Grans (Abs) 0.07 0.00 - 0.07 thou/mm3    nRBC 0 /100 wbc   Comprehensive Metabolic Panel w/ Reflex to MG   Result Value Ref Range    Glucose 112 (H) 70 - 108 mg/dL    CREATININE 0.7 0.4 - 1.2 mg/dL    BUN 5 (L) 7 - 22 mg/dL    Sodium 137 135 - 145 meq/L    Potassium reflex Magnesium 3.7 3.5 - 5.2 meq/L    Chloride 100 98 - 111 meq/L    CO2 21 (L) 23 - 33 meq/L    Calcium 9.1 8.5 - 10.5 mg/dL    AST 11 5 - 40 U/L    Alkaline Phosphatase 160 (H) 38 - 126 U/L    Total Protein 7.3 6.1 - 8.0 g/dL    Albumin 3.6 3.5 - 5.1 g/dL    Total Bilirubin 0.9 0.3 - 1.2 mg/dL    ALT 12 11 - 66 U/L   C-reactive protein   Result Value Ref Range    CRP 8.72 (H) 0.00 - 1.00 mg/dl   Lipase   Result Value Ref Range    Lipase 23.2 5.6 - 51.3 U/L   HCG Qualitative, Serum   Result Value Ref Range    Preg, Serum NEGATIVE NEGATIVE   Lactate, Sepsis   Result Value Ref Range    Lactic Acid, Sepsis 0.8 0.5 - 1.9 mmol/L   Procalcitonin   Result Value Ref Range    Procalcitonin < 0.02 0.01 - 0.09 ng/mL   Urine with Reflexed Micro   Result Value Ref Range    Glucose, Ur NEGATIVE NEGATIVE mg/dl    Bilirubin Urine MODERATE (A) NEGATIVE    Ketones, Urine 80 (A) NEGATIVE    Specific Gravity, Urine 1.027 1.002 - 1.030    Blood, Urine LARGE (A) NEGATIVE    pH, UA 5.5 5.0 - 9.0    Protein, UA 30 (A) NEGATIVE    Urobilinogen, Urine 1.0 0.0 - 1.0 eu/dl    Nitrite, Urine NEGATIVE NEGATIVE    Leukocyte Esterase, Urine NEGATIVE NEGATIVE    Color, UA DK YELLOW (A) STRAW-YELLOW    Character, Urine CLEAR CLEAR-SL CLOUD    RBC, UA 10-15 0-2/hpf /hpf    WBC, UA 0-2 0-4/hpf /hpf    Epithelial Cells, UA 3-5 3-5/hpf /hpf    Bacteria, UA NONE SEEN FEW/NONE SEEN /hpf    Casts UA 4-8 HYALINE NONE SEEN /lpf    Crystals, UA NONE SEEN NONE SEEN    Renal Epithelial, UA NONE SEEN NONE SEEN    Yeast, UA NONE SEEN NONE SEEN    CASTS 2 NONE SEEN NONE SEEN /lpf    MISCELLANEOUS 2 NONE SEEN    Bile Acids, Total   Result Value Ref Range    Ictotest NEGATIVE NEGATIVE   Anion Gap   Result Value Ref Range    Anion Gap 16.0 8.0 - 16.0 meq/L   Glomerular Filtration Rate, Estimated   Result Value Ref Range    Est, Glom Filt Rate >90 ml/min/1.73m2   Osmolality   Result Value Ref Range    Osmolality Calc 271.8 (L) 275.0 - 300.0 mOsmol/kg   Basic Metabolic Panel w/ Reflex to MG   Result Value Ref Range    Sodium 139 135 - 145 meq/L    Potassium reflex Magnesium 3.8 3.5 - 5.2 meq/L    Chloride 107 98 - 111 meq/L    CO2 22 (L) 23 - 33 meq/L    Glucose 135 (H) 70 - 108 mg/dL    BUN 5 (L) 7 - 22 mg/dL    CREATININE 0.5 0.4 - 1.2 mg/dL    Calcium 8.1 (L) 8.5 - 10.5 mg/dL   CBC   Result Value Ref Range    WBC 11.6 (H) 4.8 - 10.8 thou/mm3    RBC 3.76 (L) 4.20 - 5.40 mill/mm3    Hemoglobin 9.7 (L) 12.0 - 16.0 gm/dl    Hematocrit 31.2 (L) 37.0 - 47.0 %    MCV 83.0 81.0 - 99.0 fL    MCH 25.8 (L) 26.0 - 33.0 pg    MCHC 31.1 (L) 32.2 - 35.5 gm/dl    RDW-CV 14.3 11.5 - 14.5 %    RDW-SD 43.1 35.0 - 45.0 fL    Platelets 105 534 - 094 thou/mm3    MPV 9.0 (L) 9.4 - 12.4 fL   Anion Gap   Result Value Ref Range    Anion Gap 10.0 8.0 - 16.0 meq/L   Glomerular Filtration Rate, Estimated   Result Value Ref Range    Est, Glom Filt Rate >90 ml/min/1.73m2   CBC   Result Value Ref Range    WBC 13.8 (H) 4.8 - 10.8 thou/mm3    RBC 3.93 (L) 4.20 - 5.40 mill/mm3    Hemoglobin 10.0 (L) 12.0 - 16.0 gm/dl    Hematocrit 32.7 (L) 37.0 - 47.0 %    MCV 83.2 81.0 - 99.0 fL    MCH 25.4 (L) 26.0 - 33.0 pg    MCHC 30.6 (L) 32.2 - 35.5 gm/dl    RDW-CV 14.8 (H) 11.5 - 14.5 %    RDW-SD 45.1 (H) 35.0 - 45.0 fL    Platelets 001 561 - 676 thou/mm3    MPV 9.1 (L) 9.4 - 12.4 fL   Comprehensive Metabolic Panel w/ Reflex to MG   Result Value Ref Range    Glucose 108 70 - 108 mg/dL    CREATININE 0.5 0.4 - 1.2 mg/dL    BUN 5 (L) 7 - 22 mg/dL    Sodium 144 135 - 145 meq/L    Potassium reflex Magnesium 3.9 3.5 - 5.2 meq/L    Chloride 110 98 - 111 meq/L    CO2 24 23 - 33 meq/L    Calcium 8.4 (L) 8.5 - 10.5 mg/dL    AST 8 5 - 40 U/L    Alkaline Phosphatase 85 38 - 126 U/L    Total Protein 5.2 (L) 6.1 - 8.0 g/dL    Albumin 2.6 (L) 3.5 - 5.1 g/dL    Total Bilirubin 0.4 0.3 - 1.2 mg/dL    ALT 8 (L) 11 - 66 U/L   Anion Gap   Result Value Ref Range    Anion Gap 10.0 8.0 - 16.0 meq/L   Glomerular Filtration Rate, Estimated   Result Value Ref Range    Est, Glom Filt Rate >90 ml/min/1.73m2       Diet:  ADULT DIET;  Regular    Activity:  Up ad kofi (Patient can move independently)    Follow-up:  in 1-2 weeks with Jaxon Edwards MD, follow-up with GI on 7/14/2021    Disposition: home    Condition: Stable    Time Spent: 35 minutes    Electronically signed by Dario Reyes MD on 7/4/2021 at 3:20 PM    Discharging Hospitalist

## 2021-07-04 NOTE — PLAN OF CARE
Problem: Safety:  Goal: Free from accidental physical injury  Description: Free from accidental physical injury  7/4/2021 1028 by Vinay Arana RN  Outcome: Met This Shift     Problem: Safety:  Goal: Free from intentional harm  Description: Free from intentional harm  7/4/2021 1028 by Vinay Arana RN  Outcome: Met This Shift     Problem: Daily Care:  Goal: Daily care needs are met  Description: Daily care needs are met  7/4/2021 1028 by Vinay Arana RN  Note: Assist with ADLs as needed. Problem: Pain:  Description: Pain management should include both nonpharmacologic and pharmacologic interventions. Goal: Patient's pain/discomfort is manageable  Description: Patient's pain/discomfort is manageable  7/4/2021 1028 by Vinay Arana RN  Outcome: Met This Shift  Note: Patient denies any pain. Problem: Pain:  Description: Pain management should include both nonpharmacologic and pharmacologic interventions. Goal: Pain level will decrease  Description: Pain level will decrease  7/4/2021 1028 by Vinay Arana RN  Outcome: Met This Shift     Problem: Pain:  Description: Pain management should include both nonpharmacologic and pharmacologic interventions. Goal: Control of acute pain  Description: Control of acute pain  7/4/2021 1028 by Vinay Arana RN  Outcome: Met This Shift     Problem: Pain:  Description: Pain management should include both nonpharmacologic and pharmacologic interventions.   Goal: Control of chronic pain  Description: Control of chronic pain  7/4/2021 1028 by Vinay Arana RN  Outcome: Met This Shift     Problem: Skin Integrity:  Goal: Skin integrity will stabilize  Description: Skin integrity will stabilize  7/4/2021 1028 by Vinay Arana RN  Outcome: Met This Shift     Problem: Discharge Planning:  Goal: Patients continuum of care needs are met  Description: Patients continuum of care needs are met  7/4/2021 1028 by Nessa Patiño RN  Outcome: Met This Shift  Note: Patient plans to return home at discharge with . Problem: Discharge Planning:  Goal: Participates in care planning  Description: Participates in care planning  7/4/2021 1028 by Nessa Patiño RN  Outcome: Met This Shift     Problem: Discharge Planning:  Goal: Discharged to appropriate level of care  Description: Discharged to appropriate level of care  7/4/2021 1028 by Nessa Patiño RN  Outcome: Met This Shift     Problem: Bowel Function - Altered:  Goal: Bowel elimination is within specified parameters  Description: Bowel elimination is within specified parameters  7/4/2021 1028 by Nessa Patiño RN  Outcome: Ongoing  Note: Patient having loose stools. Care plan reviewed with patient. Patient verbalize understanding of the plan of care and contribute to goal setting.

## 2021-07-04 NOTE — PROGRESS NOTES
Discharge teaching and instructions for diagnosis/procedure of Chrons exacerbation completed with patient using teachback method. AVS reviewed. Printed prescriptions given to patient. Patient voiced understanding regarding prescriptions, follow up appointments, and care of self at home. Discharged in a wheelchair to  home with support per family.

## 2021-07-09 ENCOUNTER — HOSPITAL ENCOUNTER (INPATIENT)
Age: 31
LOS: 10 days | Discharge: HOME | DRG: 356 | End: 2021-07-19
Attending: FAMILY MEDICINE | Admitting: SURGERY
Payer: COMMERCIAL

## 2021-07-09 ENCOUNTER — APPOINTMENT (OUTPATIENT)
Dept: GENERAL RADIOLOGY | Age: 31
DRG: 356 | End: 2021-07-09
Payer: COMMERCIAL

## 2021-07-09 ENCOUNTER — APPOINTMENT (OUTPATIENT)
Dept: CT IMAGING | Age: 31
DRG: 356 | End: 2021-07-09
Payer: COMMERCIAL

## 2021-07-09 DIAGNOSIS — D72.829 LEUKOCYTOSIS, UNSPECIFIED TYPE: ICD-10-CM

## 2021-07-09 DIAGNOSIS — K50.014 CROHN'S DISEASE OF SMALL INTESTINE WITH ABSCESS (HCC): ICD-10-CM

## 2021-07-09 DIAGNOSIS — K66.8 INTRA-ABDOMINAL FREE AIR OF UNKNOWN ETIOLOGY: Primary | ICD-10-CM

## 2021-07-09 DIAGNOSIS — R10.84 GENERALIZED ABDOMINAL PAIN: ICD-10-CM

## 2021-07-09 PROBLEM — K65.1 INTRA-ABDOMINAL ABSCESS (HCC): Status: ACTIVE | Noted: 2021-07-09

## 2021-07-09 LAB
ALBUMIN SERPL-MCNC: 2.7 G/DL (ref 3.5–5.1)
ALP BLD-CCNC: 203 U/L (ref 38–126)
ALT SERPL-CCNC: 79 U/L (ref 11–66)
ANION GAP SERPL CALCULATED.3IONS-SCNC: 12 MEQ/L (ref 8–16)
AST SERPL-CCNC: 69 U/L (ref 5–40)
BASOPHILS # BLD: 0 %
BASOPHILS ABSOLUTE: 0 THOU/MM3 (ref 0–0.1)
BILIRUB SERPL-MCNC: 1.2 MG/DL (ref 0.3–1.2)
BUN BLDV-MCNC: 10 MG/DL (ref 7–22)
CALCIUM SERPL-MCNC: 8.8 MG/DL (ref 8.5–10.5)
CHLORIDE BLD-SCNC: 98 MEQ/L (ref 98–111)
CO2: 27 MEQ/L (ref 23–33)
CREAT SERPL-MCNC: 0.3 MG/DL (ref 0.4–1.2)
DIFFERENTIAL TYPE: ABNORMAL
EOSINOPHIL # BLD: 0 %
EOSINOPHILS ABSOLUTE: 0 THOU/MM3 (ref 0–0.4)
ERYTHROCYTE [DISTWIDTH] IN BLOOD BY AUTOMATED COUNT: 14.6 % (ref 11.5–14.5)
ERYTHROCYTE [DISTWIDTH] IN BLOOD BY AUTOMATED COUNT: 43.1 FL (ref 35–45)
GFR SERPL CREATININE-BSD FRML MDRD: > 90 ML/MIN/1.73M2
GLUCOSE BLD-MCNC: 133 MG/DL (ref 70–108)
HCT VFR BLD CALC: 42.1 % (ref 37–47)
HEMOGLOBIN: 12.9 GM/DL (ref 12–16)
IMMATURE GRANS (ABS): 0.31 THOU/MM3 (ref 0–0.07)
IMMATURE GRANULOCYTES: 0.8 %
LACTIC ACID, SEPSIS: 1.4 MMOL/L (ref 0.5–1.9)
LYMPHOCYTES # BLD: 1.4 %
LYMPHOCYTES ABSOLUTE: 0.5 THOU/MM3 (ref 1–4.8)
MCH RBC QN AUTO: 25 PG (ref 26–33)
MCHC RBC AUTO-ENTMCNC: 30.6 GM/DL (ref 32.2–35.5)
MCV RBC AUTO: 81.4 FL (ref 81–99)
MONOCYTES # BLD: 2.1 %
MONOCYTES ABSOLUTE: 0.8 THOU/MM3 (ref 0.4–1.3)
NUCLEATED RED BLOOD CELLS: 0 /100 WBC
OSMOLALITY CALCULATION: 274.8 MOSMOL/KG (ref 275–300)
PATHOLOGIST REVIEW: ABNORMAL
PLATELET # BLD: 537 THOU/MM3 (ref 130–400)
PMV BLD AUTO: 8.8 FL (ref 9.4–12.4)
POTASSIUM REFLEX MAGNESIUM: 4.1 MEQ/L (ref 3.5–5.2)
PREGNANCY, SERUM: NEGATIVE
RBC # BLD: 5.17 MILL/MM3 (ref 4.2–5.4)
SCAN OF BLOOD SMEAR: NORMAL
SEG NEUTROPHILS: 95.7 %
SEGMENTED NEUTROPHILS ABSOLUTE COUNT: 35.5 THOU/MM3 (ref 1.8–7.7)
SODIUM BLD-SCNC: 137 MEQ/L (ref 135–145)
TOTAL PROTEIN: 5.3 G/DL (ref 6.1–8)
TOXIC GRANULATION: PRESENT
WBC # BLD: 37.1 THOU/MM3 (ref 4.8–10.8)

## 2021-07-09 PROCEDURE — 87075 CULTR BACTERIA EXCEPT BLOOD: CPT

## 2021-07-09 PROCEDURE — 2500000003 HC RX 250 WO HCPCS: Performed by: PHYSICIAN ASSISTANT

## 2021-07-09 PROCEDURE — 80053 COMPREHEN METABOLIC PANEL: CPT

## 2021-07-09 PROCEDURE — 2580000003 HC RX 258: Performed by: FAMILY MEDICINE

## 2021-07-09 PROCEDURE — 6360000002 HC RX W HCPCS: Performed by: RADIOLOGY

## 2021-07-09 PROCEDURE — 96361 HYDRATE IV INFUSION ADD-ON: CPT

## 2021-07-09 PROCEDURE — 96367 TX/PROPH/DG ADDL SEQ IV INF: CPT

## 2021-07-09 PROCEDURE — 6360000002 HC RX W HCPCS: Performed by: FAMILY MEDICINE

## 2021-07-09 PROCEDURE — 6360000002 HC RX W HCPCS: Performed by: PHYSICIAN ASSISTANT

## 2021-07-09 PROCEDURE — 0W9F30Z DRAINAGE OF ABDOMINAL WALL WITH DRAINAGE DEVICE, PERCUTANEOUS APPROACH: ICD-10-PCS | Performed by: RADIOLOGY

## 2021-07-09 PROCEDURE — 99222 1ST HOSP IP/OBS MODERATE 55: CPT | Performed by: SURGERY

## 2021-07-09 PROCEDURE — 96375 TX/PRO/DX INJ NEW DRUG ADDON: CPT

## 2021-07-09 PROCEDURE — 83605 ASSAY OF LACTIC ACID: CPT

## 2021-07-09 PROCEDURE — 87077 CULTURE AEROBIC IDENTIFY: CPT

## 2021-07-09 PROCEDURE — 87070 CULTURE OTHR SPECIMN AEROBIC: CPT

## 2021-07-09 PROCEDURE — 99284 EMERGENCY DEPT VISIT MOD MDM: CPT

## 2021-07-09 PROCEDURE — 96366 THER/PROPH/DIAG IV INF ADDON: CPT

## 2021-07-09 PROCEDURE — 77012 CT SCAN FOR NEEDLE BIOPSY: CPT

## 2021-07-09 PROCEDURE — APPSS180 APP SPLIT SHARED TIME > 60 MINUTES: Performed by: PHYSICIAN ASSISTANT

## 2021-07-09 PROCEDURE — 2580000003 HC RX 258: Performed by: PHYSICIAN ASSISTANT

## 2021-07-09 PROCEDURE — 2500000003 HC RX 250 WO HCPCS: Performed by: FAMILY MEDICINE

## 2021-07-09 PROCEDURE — 96365 THER/PROPH/DIAG IV INF INIT: CPT

## 2021-07-09 PROCEDURE — 74022 RADEX COMPL AQT ABD SERIES: CPT

## 2021-07-09 PROCEDURE — 2709999900 CT ABSCESS DRAINAGE SOFT TISSUE

## 2021-07-09 PROCEDURE — 6360000004 HC RX CONTRAST MEDICATION: Performed by: FAMILY MEDICINE

## 2021-07-09 PROCEDURE — 74177 CT ABD & PELVIS W/CONTRAST: CPT

## 2021-07-09 PROCEDURE — 87205 SMEAR GRAM STAIN: CPT

## 2021-07-09 PROCEDURE — 84703 CHORIONIC GONADOTROPIN ASSAY: CPT

## 2021-07-09 PROCEDURE — 1200000000 HC SEMI PRIVATE

## 2021-07-09 PROCEDURE — 85025 COMPLETE CBC W/AUTO DIFF WBC: CPT

## 2021-07-09 PROCEDURE — 10030 IMG GID FLU COLL DRG SFT TIS: CPT

## 2021-07-09 PROCEDURE — 81001 URINALYSIS AUTO W/SCOPE: CPT

## 2021-07-09 PROCEDURE — 36415 COLL VENOUS BLD VENIPUNCTURE: CPT

## 2021-07-09 RX ORDER — MIDAZOLAM HYDROCHLORIDE 1 MG/ML
0.5 INJECTION INTRAMUSCULAR; INTRAVENOUS ONCE
Status: COMPLETED | OUTPATIENT
Start: 2021-07-09 | End: 2021-07-09

## 2021-07-09 RX ORDER — ONDANSETRON 4 MG/1
4 TABLET, ORALLY DISINTEGRATING ORAL EVERY 8 HOURS PRN
Status: DISCONTINUED | OUTPATIENT
Start: 2021-07-09 | End: 2021-07-19 | Stop reason: HOSPADM

## 2021-07-09 RX ORDER — FENTANYL CITRATE 50 UG/ML
25 INJECTION, SOLUTION INTRAMUSCULAR; INTRAVENOUS ONCE
Status: COMPLETED | OUTPATIENT
Start: 2021-07-09 | End: 2021-07-09

## 2021-07-09 RX ORDER — ONDANSETRON 2 MG/ML
4 INJECTION INTRAMUSCULAR; INTRAVENOUS ONCE
Status: COMPLETED | OUTPATIENT
Start: 2021-07-09 | End: 2021-07-09

## 2021-07-09 RX ORDER — LEVOFLOXACIN 5 MG/ML
500 INJECTION, SOLUTION INTRAVENOUS ONCE
Status: COMPLETED | OUTPATIENT
Start: 2021-07-09 | End: 2021-07-09

## 2021-07-09 RX ORDER — FENTANYL CITRATE 50 UG/ML
50 INJECTION, SOLUTION INTRAMUSCULAR; INTRAVENOUS ONCE
Status: COMPLETED | OUTPATIENT
Start: 2021-07-09 | End: 2021-07-09

## 2021-07-09 RX ORDER — SODIUM CHLORIDE 0.9 % (FLUSH) 0.9 %
5-40 SYRINGE (ML) INJECTION PRN
Status: DISCONTINUED | OUTPATIENT
Start: 2021-07-09 | End: 2021-07-13 | Stop reason: SDUPTHER

## 2021-07-09 RX ORDER — SODIUM CHLORIDE 0.9 % (FLUSH) 0.9 %
5-40 SYRINGE (ML) INJECTION EVERY 12 HOURS SCHEDULED
Status: DISCONTINUED | OUTPATIENT
Start: 2021-07-09 | End: 2021-07-13 | Stop reason: SDUPTHER

## 2021-07-09 RX ORDER — MORPHINE SULFATE 4 MG/ML
4 INJECTION, SOLUTION INTRAMUSCULAR; INTRAVENOUS
Status: DISCONTINUED | OUTPATIENT
Start: 2021-07-09 | End: 2021-07-14

## 2021-07-09 RX ORDER — 0.9 % SODIUM CHLORIDE 0.9 %
1000 INTRAVENOUS SOLUTION INTRAVENOUS ONCE
Status: COMPLETED | OUTPATIENT
Start: 2021-07-09 | End: 2021-07-09

## 2021-07-09 RX ORDER — CIPROFLOXACIN 2 MG/ML
400 INJECTION, SOLUTION INTRAVENOUS EVERY 12 HOURS
Status: DISCONTINUED | OUTPATIENT
Start: 2021-07-09 | End: 2021-07-12

## 2021-07-09 RX ORDER — SODIUM CHLORIDE 9 MG/ML
25 INJECTION, SOLUTION INTRAVENOUS PRN
Status: DISCONTINUED | OUTPATIENT
Start: 2021-07-09 | End: 2021-07-13 | Stop reason: SDUPTHER

## 2021-07-09 RX ORDER — ONDANSETRON 2 MG/ML
4 INJECTION INTRAMUSCULAR; INTRAVENOUS EVERY 6 HOURS PRN
Status: DISCONTINUED | OUTPATIENT
Start: 2021-07-09 | End: 2021-07-19 | Stop reason: HOSPADM

## 2021-07-09 RX ORDER — MORPHINE SULFATE 2 MG/ML
2 INJECTION, SOLUTION INTRAMUSCULAR; INTRAVENOUS
Status: DISCONTINUED | OUTPATIENT
Start: 2021-07-09 | End: 2021-07-14

## 2021-07-09 RX ORDER — SODIUM CHLORIDE 9 MG/ML
INJECTION, SOLUTION INTRAVENOUS CONTINUOUS
Status: ACTIVE | OUTPATIENT
Start: 2021-07-09 | End: 2021-07-12

## 2021-07-09 RX ADMIN — SODIUM CHLORIDE, PRESERVATIVE FREE 10 ML: 5 INJECTION INTRAVENOUS at 21:22

## 2021-07-09 RX ADMIN — MORPHINE SULFATE 4 MG: 4 INJECTION, SOLUTION INTRAMUSCULAR; INTRAVENOUS at 21:21

## 2021-07-09 RX ADMIN — ONDANSETRON 4 MG: 2 INJECTION INTRAMUSCULAR; INTRAVENOUS at 08:54

## 2021-07-09 RX ADMIN — FAMOTIDINE 20 MG: 10 INJECTION INTRAVENOUS at 21:21

## 2021-07-09 RX ADMIN — LEVOFLOXACIN 500 MG: 5 INJECTION, SOLUTION INTRAVENOUS at 11:39

## 2021-07-09 RX ADMIN — SODIUM CHLORIDE 1000 ML: 9 INJECTION, SOLUTION INTRAVENOUS at 08:55

## 2021-07-09 RX ADMIN — METRONIDAZOLE 500 MG: 500 INJECTION, SOLUTION INTRAVENOUS at 21:21

## 2021-07-09 RX ADMIN — SODIUM CHLORIDE: 9 INJECTION, SOLUTION INTRAVENOUS at 17:57

## 2021-07-09 RX ADMIN — FENTANYL CITRATE 25 MCG: 50 INJECTION, SOLUTION INTRAMUSCULAR; INTRAVENOUS at 14:40

## 2021-07-09 RX ADMIN — MIDAZOLAM 0.5 MG: 1 INJECTION INTRAMUSCULAR; INTRAVENOUS at 14:40

## 2021-07-09 RX ADMIN — FENTANYL CITRATE 25 MCG: 50 INJECTION, SOLUTION INTRAMUSCULAR; INTRAVENOUS at 14:43

## 2021-07-09 RX ADMIN — METRONIDAZOLE 500 MG: 500 INJECTION, SOLUTION INTRAVENOUS at 09:57

## 2021-07-09 RX ADMIN — HYDROMORPHONE HYDROCHLORIDE 0.5 MG: 1 INJECTION, SOLUTION INTRAMUSCULAR; INTRAVENOUS; SUBCUTANEOUS at 12:37

## 2021-07-09 RX ADMIN — IOPAMIDOL 80 ML: 755 INJECTION, SOLUTION INTRAVENOUS at 10:15

## 2021-07-09 RX ADMIN — HYDROMORPHONE HYDROCHLORIDE 0.5 MG: 1 INJECTION, SOLUTION INTRAMUSCULAR; INTRAVENOUS; SUBCUTANEOUS at 10:29

## 2021-07-09 RX ADMIN — FENTANYL CITRATE 25 MCG: 50 INJECTION, SOLUTION INTRAMUSCULAR; INTRAVENOUS at 14:50

## 2021-07-09 RX ADMIN — FENTANYL CITRATE 50 MCG: 50 INJECTION, SOLUTION INTRAMUSCULAR; INTRAVENOUS at 08:54

## 2021-07-09 RX ADMIN — CIPROFLOXACIN 400 MG: 2 INJECTION, SOLUTION INTRAVENOUS at 22:41

## 2021-07-09 RX ADMIN — MIDAZOLAM 0.5 MG: 1 INJECTION INTRAMUSCULAR; INTRAVENOUS at 14:50

## 2021-07-09 RX ADMIN — MORPHINE SULFATE 2 MG: 2 INJECTION, SOLUTION INTRAMUSCULAR; INTRAVENOUS at 16:35

## 2021-07-09 RX ADMIN — MIDAZOLAM 0.5 MG: 1 INJECTION INTRAMUSCULAR; INTRAVENOUS at 14:43

## 2021-07-09 ASSESSMENT — ENCOUNTER SYMPTOMS
BACK PAIN: 1
ABDOMINAL DISTENTION: 1
COLOR CHANGE: 0
CONSTIPATION: 0
ABDOMINAL PAIN: 1
BLOOD IN STOOL: 0
NAUSEA: 0
SHORTNESS OF BREATH: 0
DIARRHEA: 1
VOMITING: 0

## 2021-07-09 ASSESSMENT — PAIN SCALES - GENERAL
PAINLEVEL_OUTOF10: 10
PAINLEVEL_OUTOF10: 10
PAINLEVEL_OUTOF10: 7
PAINLEVEL_OUTOF10: 7
PAINLEVEL_OUTOF10: 6
PAINLEVEL_OUTOF10: 7
PAINLEVEL_OUTOF10: 7
PAINLEVEL_OUTOF10: 8
PAINLEVEL_OUTOF10: 5
PAINLEVEL_OUTOF10: 7
PAINLEVEL_OUTOF10: 6

## 2021-07-09 ASSESSMENT — PAIN DESCRIPTION - PAIN TYPE
TYPE: ACUTE PAIN
TYPE: ACUTE PAIN

## 2021-07-09 ASSESSMENT — PAIN DESCRIPTION - DESCRIPTORS: DESCRIPTORS: SHARP

## 2021-07-09 ASSESSMENT — PAIN DESCRIPTION - LOCATION
LOCATION: ABDOMEN
LOCATION: ABDOMEN

## 2021-07-09 NOTE — ED NOTES
Pt is tachyapneic, tachycardic at 125-134 w/low grade temp of 99 oral.  notified. No new orders received. Will continue to monitor for safety and comfort.      Azael Zafar RN  07/09/21 [id number]

## 2021-07-09 NOTE — LETTER
[address]  [facility] [address]  Phone: [phone]          July 19, 2021     Patient: Leia Lee   YOB: 1990   Date of Visit: 7/9/2021       To Whom It May Concern: It is my medical opinion that Carolyn Madera can return to work on August 2nd, 2021. If you have any questions or concerns, please don't hesitate to call.     Sincerely,        Art Ra, RN

## 2021-07-09 NOTE — LETTER
[address]  [facility] [address]  Phone: [phone]          July 19, 2021     Patient: Thomas Hamilton   YOB: 1990   Date of Visit: 7/9/2021       To Whom It May Concern: It is my medical opinion that Deborah Eric can return to work on August 2nd, 2021. If you have any questions or concerns, please don't hesitate to call.     Sincerely,        Erin Bro RN

## 2021-07-09 NOTE — H&P
[facility]  General Surgery History & Physical  Manohar Vieyra PA-C  On behalf of Dr. Lamar Isabel    Pt Name: Miguel Tejeda  MRN: [id number]  YOB: 1990  Date of evaluation: 7/9/2021  Primary Care Physician: Yasemin Herman MD  Patient evaluated at the request of  Dr. Geri Thompson  Reason for evaluation: Abdominal pain  IMPRESSIONS   1. Large intraabdominal abscesses  2. Bilateral pleural effusions  3. Leukocytosis secondary to #1 and also on prednisone  4. Elevated liver ezymes  5.  has a past medical history of Crohn's colitis ([facility] [address] PLANS   1. NPO  2. IV fluids  3. IR consulted for perc drains to be placed  4. IV antibiotics  5. Infectious disease consult  6. Pain control  7. Antiemetics  8. Repeat labs in AM  9. Hold prednisone and Humira at this time, consider GI consult in AM  10. DVT prophylaxis-SCDs  SUBJECTIVE   History of Chief Complaint:    Lima Diaz is a 31 y. o.female who presents with abdominal pain. Stated the pain started a couple weeks ago and she was recently admitted to [facility] for reported Crohn's flareup. She was started on steroids and reported was supposed to start Humira today. Patient stated her GI doctor is Dr. Edvin Anguiano. Patient stated she was discharge on July 4 and pain was manageable until last night around 1 AM the pain became intolerable and she came to the emergency room for evaluation. Patient endorsed having 8/10 abdominal pain but denied any other acute pain or complaints. She denied any headaches, lightheadedness, dizziness, chest pain, shortness of breathing, nausea/vomiting, constipation, hematochezia, dysuria, and paresthesias. She denied any acute fevers did endorse recent night sweats. On exam patient's abdomen was distended, firm, with significant tenderness palpation and guarding.   CT abdomen pelvis revealed interval development of large perihepatic collection with trapped air bubbles extending from the dome of the liver through the lower abdomen/upper pelvis measuring 4.5 cm in its largest AP dimension. CT also noted a second collection seen in the lower left quadrant measuring 4.7 cm in its largest AP dimension, approximately 13.5 cm in craniocaudal dimension that connects across the lower abdominal/upper pelvis into the perihepatic collection consistent with abscesses. A smaller collection was also seen in the pelvis centered at the periphery all in a U-shaped confirmation which does not appear to communicate with larger collections above. Patient also noted to have moderate bilateral pleural effusions with adjacent atelectasis. Discussed surgical intervention versus IR drain placement. Plan at this time is to have interventional radiology place percutaneous drains. Case discussed with interventional radiologist, Dr. Amy Izaguirre. Care coordinated with general surgeon on-call, Dr. Dago Delgadillo. Past Medical History   has a past medical history of Crohn's colitis ([facility] [address] Past Surgical History   has a past surgical history that includes [place] tooth extraction and pr  delivery only (N/A, 10/12/2018). Medications  Prior to Admission medications    Medication Sig Start Date End Date Taking? Authorizing Provider   adalimumab (HUMIRA) 40 MG/0.8ML injection Inject 0.8 mLs into the skin once for 1 dose 21  Franchesca Ayala MD   predniSONE (DELTASONE) 20 MG tablet Take 1 tablet by mouth 2 times daily for 10 days 21  Franchesca Ayala MD   hyoscyamine (OSCIMIN) 125 MCG TBDP dispersible tablet Take 1 tablet by mouth every 4 hours as needed (abd cramping) 21   Franchesca Ayala MD   Prenatal Vit-Fe Fumarate-FA (PRENATAL 19 PO) Take by mouth    Historical Provider, MD    Scheduled Meds:   levofloxacin  500 mg Intravenous Once     Continuous Infusions:  PRN Meds:. Allergies  is allergic to duricef [cefadroxil] and penicillins. Family History  family history includes Other in her mother.   Social History   reports that she has never smoked. She has never used smokeless tobacco. She reports that she does not drink alcohol and does not use drugs. Review of Systems:  General Denies any fever or chills  HEENT Denies any headaches, lightheadedness, dizziness  Resp Denies any shortness of breath, cough or wheezing  Cardiac Denies any chest pain, palpitations  GI admits abdominal pain, denies any nausea/vomiting, constipation, or hematochezia   Denies any frequency, dysuria, hematuria  Heme Denies bruising or bleeding easily  Endocrine Denies any history of diabetes or thyroid disease  Neuro Denies any focal motor or sensory deficits  OBJECTIVE   CURRENT VITALS:  height is 4' 11\" (1.499 m) and weight is 90 lb (40.8 kg). Her oral temperature is 98.7 °F (37.1 °C). Her blood pressure is 122/85 and her pulse is 114. Her respiration is 16 and oxygen saturation is 95%. Body mass index is 18.18 kg/m². Temperature Range (24h):Temp: 98.7 °F (37.1 °C) Temp  Av.7 °F (37.1 °C)  Min: 98.7 °F (37.1 °C)  Max: 98.7 °F (37.1 °C)  BP Range (84T): Systolic (03NAB), JMJ:155 , Min:113 , PSR:463     Diastolic (43NYQ), GDN:29, Min:74, Max:85    Pulse Range (24h): Pulse  Av.7  Min: 114  Max: 134  Respiration Range (24h): Resp  Av  Min: 16  Max: 18  Current Pulse Ox (24h):  SpO2: 95 %  Pulse Ox Range (24h):  SpO2  Av %  Min: 94 %  Max: 96 %  Oxygen Amount and Delivery:    CONSTITUTIONAL: Alert and oriented times 3, no acute distress and cooperative to examination with proper mood and affect. SKIN: Skin color, texture, turgor normal. No rashes or lesions. HEENT: Head is normocephalic, atraumatic. EOMI, PERRL. NECK: Supple, symmetrical, trachea midline, no adenopathy  CHEST/LUNGS: chest symmetric with normal A/P diameter, normal respiratory rate and rhythm, lungs clear to auscultation without wheezes, rales or rhonchi. No accessory muscle use. CARDIOVASCULAR:   Tachycardia and rhythm without murmur, gallop or rub. Normal S1 and S2.  Carotid and femoral pulses 2+/4 and equal bilaterally. ABDOMEN: Firm, distended, significant tenderness to palpation with guarding. Normal active bowel sounds. RECTAL: deferred, not clinically indicated  NEUROLOGIC: There are no focalizing motor or sensory deficits. CN II-XII are grossly intact. Marco A Ra EXTREMITIES: no cyanosis, no clubbing and no edema. LABS     Recent Labs     07/09/21  0840   WBC 37.1*   HGB 12.9   HCT 42.1   *      K 4.1   CL 98   CO2 27   BUN 10   CREATININE 0.3*   CALCIUM 8.8   AST 69*   ALT 79*   BILITOT 1.2     RADIOLOGY     CT ABDOMEN PELVIS W IV CONTRAST Additional Contrast? None   Final Result   1. Interval development of large perihepatic collection with trapped air bubbles extending from dome of the liver through to the lower abdomen/upper pelvis, measures approximately 4.5 cm in its largest AP dimension. A second collection is seen in the    left lower quadrant, measures approximately 4.7 cm in largest AP dimension, approximately 13.5 cm in craniocaudad dimension and connects across the lower abdomen upper pelvis to the perihepatic collection, consistent with abscesses. Smaller collections    are seen in the pelvis centered at the periphery in a U shaped configuration, best demonstrated in the coronal plane and does not appear to communicate with the larger, aforementioned collection. 2.  Mesenteric induration and free fluid. 3.  .Moderate bilateral pleural effusions with adjacent atelectasis. **This report has been created using voice recognition software. It may contain minor errors which are inherent in voice recognition technology. **      Final report electronically signed by Dr. Chauncey Calderon on 7/9/2021 10:53 AM      XR ACUTE ABD SERIES CHEST 1 VW   Final Result   1. Small right effusion with right base consolidation. .   2. Unremarkable supine and upright views of the abdomen. 3. Hepatomegaly.             **This report has been created using voice recognition software. It may contain minor errors which are inherent in voice recognition technology. **      Final report electronically signed by Dr. Olya Beck on 7/9/2021 9:26 AM          Thank you for the interesting evaluation. Further recommendations to follow. Electronically signed by Josh Diana PA-C on 7/9/2021 at 11:46 AM     Patient seen and examined independently by me earlier today in the emergency department. Above discussed and I agree with SARA Baca. See my additional comments below for updated orders and plan. Labs, cultures, and radiographs where available were reviewed. I discussed patient concerns with the patient's nurse and instructions were given. Please see our orders for the updated patient care plan. -Patient presented this morning to the emergency department secondary to generalized abdominal pain and distention. Has been found to have a large abscess. Recently discharged due to active acute Crohn's disease inflammation at the terminal ileum. Symptoms have been going on now for a few weeks. Long discussion with patient and family at the bedside in the emergency department about exploratory laparotomy with washout and drain placement. Also discussed interventional radiology drain placement initially to see how she does. Attempt drain placements today. NPO. IV fluid hydration. IV antibiotics. Hold steroids and Humira. GI consultation for further medical management after abscesses improve. Patient and family well aware that if no improvement/worsens then surgical intervention. A.m. labs. Serial abdominal examinations. Pain control.     Electronically signed by Daron Cho MD on 7/9/21 at 8:56 PM EDT

## 2021-07-09 NOTE — ED NOTES
Pt rprts decreased pain; pressure. Rprts 6/10. Delay to admit; waiting on room assignment. Call light within reach. Family at bedside.      Char Mijares RN  07/09/21 7002

## 2021-07-09 NOTE — ED NOTES
Levaquin started at this time per eMAR. Pt states her pain is 7/10. They were updated on POC.       Travis Malin RN  07/09/21 6669

## 2021-07-09 NOTE — ED NOTES
Pt transported to [address] [place] [address] in stable condition. Called [facility] and informed the charge nurse that the patient was on their way to the unit.       Alona Hall, STANLEY  99/15/32 9335

## 2021-07-09 NOTE — ED NOTES
Pt resting in bed with side rails up 2x2 and call light in reach. Vital signs assessed and stable. IV site checked. Pt updated on plan of care. Pt voiced understanding. Pt verbalized no other needs or concerns at this time.         Ines Rosenbaum RN  07/09/21 4835

## 2021-07-09 NOTE — ED PROVIDER NOTES
level: None   Occupational History    None   Tobacco Use    Smoking status: Never Smoker    Smokeless tobacco: Never Used   Vaping Use    Vaping Use: Never used   Substance and Sexual Activity    Alcohol use: No    Drug use: No    Sexual activity: Yes     Partners: Male   Other Topics Concern    None   Social History Narrative    None     Social Determinants of Health     Financial Resource Strain:     Difficulty of Paying Living Expenses:    Food Insecurity:     Worried About Running Out of Food in the Last Year:     Ran Out of Food in the Last Year:    Transportation Needs:     Lack of Transportation (Medical):     Lack of Transportation (Non-Medical):    Physical Activity:     Days of Exercise per Week:     Minutes of Exercise per Session:    Stress:     Feeling of Stress :    Social Connections:     Frequency of Communication with Friends and Family:     Frequency of Social Gatherings with Friends and Family:     Attends Protestant Services:      Active Member of Clubs or Organizations:     Attends Club or Organization Meetings:     Marital Status:    Intimate Partner Violence:     Fear of Current or Ex-Partner:     Emotionally Abused:     Physically Abused:     Sexually Abused:       Family History   Problem Relation Age of Onset    Other Mother         PHYSICAL EXAM   VITAL SIGNS: /83   Pulse 115   Temp 98.7 °F (37.1 °C) (Oral)   Resp 16   Ht 4' 11\" (1.499 m)   Wt 90 lb (40.8 kg)   LMP 07/09/2021   SpO2 95%   BMI 18.18 kg/m²    Constitutional: Well developed, well nourished, moderate distress  Eyes: Sclera nonicteric, conjunctiva moist   HENT: Atraumatic, nose normal   Neck: Supple, no JVD   Respiratory: No retractions, no accessory muscle use, normal breath sounds   Cardiovascular: Tachycardic rate, normal rhythm, no murmurs   GI: Soft, diffuse abdominal tenderness, + guarding, bowel sounds present, +abdominal distension, no audible bruits or palpable pulsatile masses   Musculoskeletal: No edema, no deformity   Integument: No rash, dry skin   Neurologic: Alert & oriented, normal speech    RADIOLOGY/PROCEDURES   CT ABDOMEN PELVIS W IV CONTRAST Additional Contrast? None   Final Result   1. Interval development of large perihepatic collection with trapped air bubbles extending from dome of the liver through to the lower abdomen/upper pelvis, measures approximately 4.5 cm in its largest AP dimension. A second collection is seen in the    left lower quadrant, measures approximately 4.7 cm in largest AP dimension, approximately 13.5 cm in craniocaudad dimension and connects across the lower abdomen upper pelvis to the perihepatic collection, consistent with abscesses. Smaller collections    are seen in the pelvis centered at the periphery in a U shaped configuration, best demonstrated in the coronal plane and does not appear to communicate with the larger, aforementioned collection. 2.  Mesenteric induration and free fluid. 3.  .Moderate bilateral pleural effusions with adjacent atelectasis. **This report has been created using voice recognition software. It may contain minor errors which are inherent in voice recognition technology. **      Final report electronically signed by Dr. Demond Ya on 7/9/2021 10:53 AM      XR ACUTE ABD SERIES CHEST 1 VW   Final Result   1. Small right effusion with right base consolidation. .   2. Unremarkable supine and upright views of the abdomen. 3. Hepatomegaly. **This report has been created using voice recognition software. It may contain minor errors which are inherent in voice recognition technology. **      Final report electronically signed by Dr. Demond Ya on 7/9/2021 9:26 AM      CT ABSCESS DRAINAGE W CATH PLACEMENT S&I    (Results Pending)        LABS   Labs Reviewed   CBC WITH AUTO DIFFERENTIAL - Abnormal; Notable for the following components:       Result Value    WBC 37.1 (*)     MCH 25.0 (*)     MCHC 30.6 (*)     RDW-CV 14.6 (*)     Platelets 476 (*)     MPV 8.8 (*)     Segs Absolute 35.5 (*)     Lymphocytes Absolute 0.5 (*)     Immature Grans (Abs) 0.31 (*)     All other components within normal limits   COMPREHENSIVE METABOLIC PANEL W/ REFLEX TO MG FOR LOW K - Abnormal; Notable for the following components:    Glucose 133 (*)     CREATININE 0.3 (*)     AST 69 (*)     Alkaline Phosphatase 203 (*)     Total Protein 5.3 (*)     Albumin 2.7 (*)     ALT 79 (*)     All other components within normal limits   OSMOLALITY - Abnormal; Notable for the following components:    Osmolality Calc 274.8 (*)     All other components within normal limits   HCG, SERUM, QUALITATIVE   LACTATE, SEPSIS   ANION GAP   GLOMERULAR FILTRATION RATE, ESTIMATED   SCAN OF BLOOD SMEAR   URINE RT REFLEX TO CULTURE        ED COURSE & MEDICAL DECISION MAKING   Pertinent Labs & Imaging studies reviewed and interpreted. (See chart for details)   See EMR for medications prescribed   Vitals:    07/09/21 1224   BP: 121/83   Pulse: 115   Resp:    Temp:    SpO2: 95%      Differential diagnosis: viscus perforation, Crohn's flare-up, Pancreatitis, non-specific abdominal pain, Ischemic Bowel, Bowel Obstruction, Acute Cholecystitis, Acute Appendicitis, other     FINAL IMPRESSION   1. Intra-abdominal free air of unknown etiology    2. Generalized abdominal pain    3. Leukocytosis, unspecified type         PLAN   MDM - pt has what appears to be an acute abdomen, certainly concerning for perforated bowel vs severe ischemic colitis or other surgical/infectious causes. Pt will be admitted to surgery if CT confirms a surgical issue. Pt was given pain control, which helped her symptoms at this time. Pt has a significant pathology or pathologies found on CT that would imply serious outcomes. Pt will require emergent surgical admission with possible surgery. FINAL DISPO: Admit to surgery.     CRITICAL CARE NOTE:   There was a high probability of clinically significant life-threatening deterioration of the patient's condition requiring my urgent intervention. Total critical care time is 60 minutes. This includes vital sign monitoring, pulse oximetry monitoring, telemetry monitoring, clinical response to the IV medications, reviewing the nursing notes, consultation time, dictation/documetation time. (This time excludes time spent performing procedures). Pt has risks for decompensation due to her peritonitis.     Electronically signed by: Cecille Duverney MD, 7/9/2021 2:00 PM   (This note was completed with a voice recognition program)         Dante Cruz MD  07/09/21 1400

## 2021-07-09 NOTE — ED NOTES
Pt medicated per order. Rprts 6/10 pressure. Alert and oriented x4. Breathing easy and unlabored on RA.       Juanita 139, RN  07/09/21 2012

## 2021-07-09 NOTE — ED NOTES
ED to inpatient nurses report    Chief Complaint   Patient presents with    Flank Pain    Abdominal Pain      Present to ED from home  LOC: alert and orientated to name, place, date  Vital signs   Vitals:    07/09/21 1505 07/09/21 1558 07/09/21 1628 07/09/21 1742   BP: 103/61 111/78 115/77 107/78   Pulse: 122 121  129   Resp: 28 21  24   Temp:    99 °F (37.2 °C)   TempSrc:       SpO2: 95% 95% 94% 95%   Weight:       Height:          Oxygen Baseline RA    Current needs required none Bipap/Cpap No  LDAs:   Peripheral IV 07/09/21 Right Forearm (Active)   Site Assessment Clean;Dry; Intact 07/09/21 1224   Line Status Infusing 07/09/21 1224   Dressing Status Clean; Intact;Dry 07/09/21 1224   Dressing Intervention New 07/09/21 0847     Mobility: Requires assistance * 1  Pending ED orders: none  Present condition: stable    Our promise was given to patient    Electronically signed by Shelbi Bruno RN on 7/9/2021 at 6:24 PM       Shelbi Bruno RN  07/09/21 2144

## 2021-07-09 NOTE — ED NOTES
**This is a Medical/ PA/ APRN Student Note and is charted for educational purposes. The non-physician staff attested note is not to be used for billing purposes or to guide patient care. Please see the physician modifications/ attestation for treatment plan/suggestions. This note has been reviewed and feedback has been provided to the student. **    Dr. Cruz Doherty  ED visit Note  Pt Name: Franki Gloria Record Number: [id number]  Date of Birth 1990   Today's Date: 7/9/2021    CHIEF COMPLAINT:     Chief Complaint   Patient presents with    Flank Pain    Abdominal Pain       HISTORY OF PRESENT ILLNESS   Kevin Sethi is a 31 y.o. female who presents to the ED c/o acute onset abdominal pain. The patient has a history of chrons disease and was recently admitted for an excacerbation, discharged on 7/4 on prednisone. Pt presents today for sharp, constant, RLQ pain that radiates to the back and both shoulders, this started at 1 am this morning and has gradually worsened to a 10. No association with food. Pt was compliant with prednisone, and low fiber diet. No relieving factors. Assocatied with watery diarrhea. She is sexually active, currently menstruating. REVIEW OF SYSTEMS:    Review of Systems   Constitutional: Positive for appetite change. Negative for chills and fever. Eyes: Negative for visual disturbance. Respiratory: Negative for shortness of breath. Cardiovascular: Negative for chest pain. Gastrointestinal: Positive for abdominal distention, abdominal pain and diarrhea. Negative for blood in stool, constipation, nausea and vomiting. Genitourinary: Positive for vaginal bleeding. Negative for dysuria, hematuria and menstrual problem. Musculoskeletal: Positive for back pain. Negative for myalgias. Skin: Negative for color change and rash.    Neurological: Negative for dizziness and Physical Exam  Constitutional:       General: She is in acute distress. Appearance: She is ill-appearing. She is not toxic-appearing. HENT:      Head: Normocephalic and atraumatic. Cardiovascular:      Rate and Rhythm: Regular rhythm. Tachycardia present. Heart sounds: Normal heart sounds. Pulmonary:      Effort: Pulmonary effort is normal. No respiratory distress. Breath sounds: Normal breath sounds. No wheezing. Abdominal:      General: Abdomen is protuberant. There is distension. There are no signs of injury. Palpations: Abdomen is rigid. Tenderness: There is abdominal tenderness. There is guarding. There is no rebound. Comments: Diffuse tenderness    Skin:     General: Skin is warm and dry. Capillary Refill: Capillary refill takes less than 2 seconds. Neurological:      General: No focal deficit present. Mental Status: She is alert and oriented to person, place, and time. Psychiatric:         Mood and Affect: Mood normal.         Behavior: Behavior normal.         LABS     Labs Reviewed   HCG, SERUM, QUALITATIVE   CBC WITH AUTO DIFFERENTIAL   COMPREHENSIVE METABOLIC PANEL W/ REFLEX TO MG FOR LOW K   URINE RT REFLEX TO CULTURE   LACTATE, SEPSIS   LACTATE, SEPSIS       RADIOLOGY     XR ACUTE ABD SERIES CHEST 1 VW    (Results Pending)   CT ABDOMEN PELVIS W IV CONTRAST Additional Contrast? None    (Results Pending)       DIFFERENTIALS:   1. Perforation  2. Bowel obstruction  3. Colitis  4. Appendicitis  5. Ectopic pregnancy    PLAN:   1. Labs: CBC, CMP, lactic acid, UA, hcg  2. Imaging: upright CXR, CT abdomen  3. IVF, fentanyl, zofran, IV flagyl & Levaquin    Electronically signed by Rachelle Quesada on 7/9/2021 at 8:56 AM   **This is a Medical/ PA/ APRN Student Note and is charted for educational purposes. The non-physician staff attested note is not to be used for billing purposes or to guide patient care.  Please see the physician modifications/ attestation for

## 2021-07-09 NOTE — LETTER
[address]  [facility] [address]  Phone: [phone]        July 19, 2021     Patient: Geovanni Smith   YOB: 1990   Date of Visit: 7/9/2021       To Whom It May Concern: It is my medical opinion that Kevin Littlejohn can return to work of August 2nd, 2021. If you have any questions or concerns, please don't hesitate to call.     Sincerely,        Margaret Wakefield RN

## 2021-07-09 NOTE — ED NOTES
Pt resting in bed with side rails up 2x2 and call light in reach. Vital signs assessed and stable. IV site checked. Pt updated on plan of care. Pt voiced understanding. Pt verbalized no other needs or concerns at this time.         Ghazala Goss RN  07/09/21 6289

## 2021-07-09 NOTE — ED NOTES
ED to inpatient nurses report    Chief Complaint   Patient presents with    Flank Pain    Abdominal Pain      Present to ED from home  LOC: alert and orientated to name, place, date  Vital signs   Vitals:    07/09/21 0822 07/09/21 1028 07/09/21 1132   BP: 113/74  122/85   Pulse: 134 120 114   Resp: 18  16   Temp: 98.7 °F (37.1 °C)     TempSrc: Oral     SpO2: 94% 96% 95%   Weight: 90 lb (40.8 kg)     Height: 4' 11\" (1.499 m)        Oxygen Baseline RA    Current needs required RA Bipap/Cpap No  LDAs:   Peripheral IV 07/09/21 Right Forearm (Active)   Site Assessment Clean;Dry; Intact 07/09/21 1215   Line Status Infusing 07/09/21 1215   Dressing Status Clean; Intact;Dry 07/09/21 1215   Dressing Intervention New 07/09/21 0847     Mobility: Independent  Pending ED orders: none  Present condition: stable  Person of contract self, phone number self  Our promise was given to patient    Electronically signed by Herosn Post RN on 7/9/2021 at 12:15 PM       Herson Post RN  07/09/21 1213

## 2021-07-09 NOTE — PROGRESS NOTES
1410 Patient received in CT for pre-procedure assessment. Monitor applied. 26 Dr. Mckayla Cornelius here; spoke to patient. This procedure has been fully reviewed with the patient and written informed consent has been obtained. 1422 Patient assisted to CT table and pre scan started. 1438 Procedure started with Dr. Mckayla Cornelius. 1445 Right 14fr APD drain inserted. 1455 Left 14fr APD drain inserted. 1500 Procedure completed; patient tolerated well. Post scan obtained. 1506 Patient on cart; comfort ensured. 1510 Report called to ER and patient taken to ER via cart.

## 2021-07-10 LAB
ALBUMIN SERPL-MCNC: 1.9 G/DL (ref 3.5–5.1)
ALP BLD-CCNC: 102 U/L (ref 38–126)
ALT SERPL-CCNC: 35 U/L (ref 11–66)
ANION GAP SERPL CALCULATED.3IONS-SCNC: 10 MEQ/L (ref 8–16)
AST SERPL-CCNC: 12 U/L (ref 5–40)
BACTERIA: ABNORMAL /HPF
BILIRUB SERPL-MCNC: 1.2 MG/DL (ref 0.3–1.2)
BILIRUBIN URINE: ABNORMAL
BLOOD, URINE: ABNORMAL
BUN BLDV-MCNC: 7 MG/DL (ref 7–22)
CALCIUM SERPL-MCNC: 8.4 MG/DL (ref 8.5–10.5)
CASTS 2: ABNORMAL /LPF
CASTS UA: ABNORMAL /LPF
CHARACTER, URINE: ABNORMAL
CHLORIDE BLD-SCNC: 101 MEQ/L (ref 98–111)
CO2: 27 MEQ/L (ref 23–33)
COLOR: ABNORMAL
CREAT SERPL-MCNC: 0.5 MG/DL (ref 0.4–1.2)
CRYSTALS, UA: ABNORMAL
EPITHELIAL CELLS, UA: ABNORMAL /HPF
ERYTHROCYTE [DISTWIDTH] IN BLOOD BY AUTOMATED COUNT: 14.9 % (ref 11.5–14.5)
ERYTHROCYTE [DISTWIDTH] IN BLOOD BY AUTOMATED COUNT: 45.1 FL (ref 35–45)
GFR SERPL CREATININE-BSD FRML MDRD: > 90 ML/MIN/1.73M2
GLUCOSE BLD-MCNC: 97 MG/DL (ref 70–108)
GLUCOSE URINE: NEGATIVE MG/DL
HCT VFR BLD CALC: 38.7 % (ref 37–47)
HEMOGLOBIN: 12 GM/DL (ref 12–16)
ICTOTEST: NEGATIVE
KETONES, URINE: 40
LEUKOCYTE ESTERASE, URINE: ABNORMAL
MCH RBC QN AUTO: 25.6 PG (ref 26–33)
MCHC RBC AUTO-ENTMCNC: 31 GM/DL (ref 32.2–35.5)
MCV RBC AUTO: 82.7 FL (ref 81–99)
MISCELLANEOUS 2: ABNORMAL
MUCUS: ABNORMAL
NITRITE, URINE: NEGATIVE
PH UA: 5.5 (ref 5–9)
PLATELET # BLD: 306 THOU/MM3 (ref 130–400)
PMV BLD AUTO: 8.8 FL (ref 9.4–12.4)
POTASSIUM SERPL-SCNC: 4 MEQ/L (ref 3.5–5.2)
PROTEIN UA: 30
RBC # BLD: 4.68 MILL/MM3 (ref 4.2–5.4)
RBC URINE: ABNORMAL /HPF
RENAL EPITHELIAL, UA: ABNORMAL
SODIUM BLD-SCNC: 138 MEQ/L (ref 135–145)
SPECIFIC GRAVITY, URINE: 1.02 (ref 1–1.03)
TOTAL PROTEIN: 4.6 G/DL (ref 6.1–8)
UROBILINOGEN, URINE: 1 EU/DL (ref 0–1)
WBC # BLD: 33.1 THOU/MM3 (ref 4.8–10.8)
WBC UA: ABNORMAL /HPF
YEAST: ABNORMAL

## 2021-07-10 PROCEDURE — 1200000000 HC SEMI PRIVATE

## 2021-07-10 PROCEDURE — 2500000003 HC RX 250 WO HCPCS: Performed by: PHYSICIAN ASSISTANT

## 2021-07-10 PROCEDURE — 6360000002 HC RX W HCPCS: Performed by: PHYSICIAN ASSISTANT

## 2021-07-10 PROCEDURE — 6360000002 HC RX W HCPCS: Performed by: OPHTHALMOLOGY

## 2021-07-10 PROCEDURE — 2580000003 HC RX 258: Performed by: PHYSICIAN ASSISTANT

## 2021-07-10 PROCEDURE — 99232 SBSQ HOSP IP/OBS MODERATE 35: CPT | Performed by: SURGERY

## 2021-07-10 PROCEDURE — 85027 COMPLETE CBC AUTOMATED: CPT

## 2021-07-10 PROCEDURE — 99222 1ST HOSP IP/OBS MODERATE 55: CPT | Performed by: OPHTHALMOLOGY

## 2021-07-10 PROCEDURE — 36415 COLL VENOUS BLD VENIPUNCTURE: CPT

## 2021-07-10 PROCEDURE — 80053 COMPREHEN METABOLIC PANEL: CPT

## 2021-07-10 RX ORDER — METHYLPREDNISOLONE SODIUM SUCCINATE 40 MG/ML
40 INJECTION, POWDER, LYOPHILIZED, FOR SOLUTION INTRAMUSCULAR; INTRAVENOUS DAILY
Status: DISCONTINUED | OUTPATIENT
Start: 2021-07-10 | End: 2021-07-11

## 2021-07-10 RX ADMIN — SODIUM CHLORIDE, PRESERVATIVE FREE 10 ML: 5 INJECTION INTRAVENOUS at 19:44

## 2021-07-10 RX ADMIN — METRONIDAZOLE 500 MG: 500 INJECTION, SOLUTION INTRAVENOUS at 21:01

## 2021-07-10 RX ADMIN — MORPHINE SULFATE 4 MG: 4 INJECTION, SOLUTION INTRAMUSCULAR; INTRAVENOUS at 12:25

## 2021-07-10 RX ADMIN — SODIUM CHLORIDE, PRESERVATIVE FREE 10 ML: 5 INJECTION INTRAVENOUS at 07:24

## 2021-07-10 RX ADMIN — METHYLPREDNISOLONE SODIUM SUCCINATE 40 MG: 40 INJECTION, POWDER, FOR SOLUTION INTRAMUSCULAR; INTRAVENOUS at 14:02

## 2021-07-10 RX ADMIN — FAMOTIDINE 20 MG: 10 INJECTION INTRAVENOUS at 07:25

## 2021-07-10 RX ADMIN — METRONIDAZOLE 500 MG: 500 INJECTION, SOLUTION INTRAVENOUS at 04:20

## 2021-07-10 RX ADMIN — MORPHINE SULFATE 2 MG: 2 INJECTION, SOLUTION INTRAMUSCULAR; INTRAVENOUS at 16:17

## 2021-07-10 RX ADMIN — FAMOTIDINE 20 MG: 10 INJECTION INTRAVENOUS at 19:44

## 2021-07-10 RX ADMIN — METRONIDAZOLE 500 MG: 500 INJECTION, SOLUTION INTRAVENOUS at 12:08

## 2021-07-10 RX ADMIN — SODIUM CHLORIDE: 9 INJECTION, SOLUTION INTRAVENOUS at 16:17

## 2021-07-10 RX ADMIN — CIPROFLOXACIN 400 MG: 2 INJECTION, SOLUTION INTRAVENOUS at 21:03

## 2021-07-10 RX ADMIN — MORPHINE SULFATE 2 MG: 2 INJECTION, SOLUTION INTRAMUSCULAR; INTRAVENOUS at 07:59

## 2021-07-10 RX ADMIN — CIPROFLOXACIN 400 MG: 2 INJECTION, SOLUTION INTRAVENOUS at 10:15

## 2021-07-10 ASSESSMENT — PAIN SCALES - GENERAL
PAINLEVEL_OUTOF10: 3
PAINLEVEL_OUTOF10: 6
PAINLEVEL_OUTOF10: 3
PAINLEVEL_OUTOF10: 6
PAINLEVEL_OUTOF10: 3
PAINLEVEL_OUTOF10: 2
PAINLEVEL_OUTOF10: 4
PAINLEVEL_OUTOF10: 0
PAINLEVEL_OUTOF10: 0
PAINLEVEL_OUTOF10: 7
PAINLEVEL_OUTOF10: 3
PAINLEVEL_OUTOF10: 0
PAINLEVEL_OUTOF10: 5

## 2021-07-10 NOTE — PLAN OF CARE
Problem: Pain:  Goal: Pain level will decrease  Description: Pain level will decrease  Note: Pain in abdomen, max 7/10. Worse on left side. Morphine 2-4 mg every 2 hours prn. Patient requests after ambulation to bathroom. Ice pack provided and patient feels is helpful. Problem: Infection - Surgical Site:  Goal: Will show no infection signs and symptoms  Description: Will show no infection signs and symptoms  Note: Oral temps 99.0  WBC 33.1  Patient receiving IV Cipro and Flagyl. Prabhu close drains to left and right lower abdomen draining yellow fluid. Problem: GI  Goal: No bowel complications  Note: No BM this shift. Abdomen taut and distended. Allowed sips of clears and ice chips, patient has only taken in about 100 ml. Denies nausea. Problem:   Goal: Adequate urinary output  Note: Voiding rylee colored urine. Problem: Discharge Planning  Intervention: Interaction with patient/family and care team  Note: Care plan reviewed with patient and family. They verbalize understanding of the plan of care and contribute to goal setting. GI consulted today - Dr. Janene Cee consulted -- IV solumedrol started.

## 2021-07-10 NOTE — CONSULTS
Consult History & Physical      Patient:  Ar Benson  YOB: 1990  MRN: [id number]     Acct: [de-identified]    Chief Complaint:    Chief Complaint   Patient presents with    Flank Pain    Abdominal Pain       Date of Service: Pt seen/examined in consultation on 7/10/2021    History Of Present Illness:      31 y.o. female who we are asked to see/evaluate by Bisi Torres MD for medical management of Crohn's Colitis. PMHx of Crohn's Colitis, follows with Dr. Aida Ponce outpatient. Recent hospitalization with discharge just 6 days ago for Crohn's colitis flare 2/2 to loss of Humira. Responded well inpatient to Cipro, Flagyl, bowel rest, IV steroids. Discharged 7/4/21. Returned to the ED 7/9/21 for increasing abdominal pain x1 day PTA. CT A/P revealed large perihepatic collection with trapped air bubbles from liver through abdomen/upper pelvis, measuring 4.5 cm in largest AP dimension. Also 2nd collection in LLQ measuring 4.7 cm in largest AP dimension, 13.5 cm in craniocaudal dimension. Consistent with abscesses. Smaller collection, U-shaped, center pelvis, does not communicate with larger collections. Admitted under Dr. Ray Cohen for further evaluation and management. Underwent IR percutaneous drain placements. Today upon exam resting in bed. States her abdominal pain has improved some post drain placement. C/o upper back/shoulder pain. Small loose stool yesterday, none today. No N/V, chills, SOB, CP. Past Medical History:    Past Medical History:   Diagnosis Date    Crohn's colitis ([facility] [address]        Home Medications:  Prior to Admission medications    Medication Sig Start Date End Date Taking?  Authorizing Provider   predniSONE (DELTASONE) 20 MG tablet Take 1 tablet by mouth 2 times daily for 10 days 7/4/21 7/14/21 Yes Thu Torres MD   hyoscyamine (OSCIMIN) 125 MCG TBDP dispersible tablet Take 1 tablet by mouth every 4 hours as needed (abd cramping) 7/4/21  Yes Thu Torres MD adalimumab (HUMIRA) 40 MG/0.8ML injection Inject 0.8 mLs into the skin once for 1 dose 21  Martha Carrizales MD       Surgical History:  Past Surgical History:   Procedure Laterality Date    CT DRAIN SOFT TISSUE ABSCESS  2021    CT DRAIN SOFT TISSUE ABSCESS 2021 [facility] CT SCAN    CT DRAIN SOFT TISSUE ABSCESS  2021    CT DRAIN SOFT TISSUE ABSCESS 2021 [facility] CT SCAN    ID  DELIVERY ONLY N/A 10/12/2018     SECTION performed by Luis Alberto Vela MD at [address] [place] [address] Drive EXTRACTION         Family History:  Family History   Problem Relation Age of Onset    Other Mother        Past GI History:  Crohn's disease, esophageal dilatation, EGD, colonoscopy   Dr. Enrike Bell patient    Allergies:  Duricef [cefadroxil] and Penicillins    Social History:   TOBACCO:   reports that she has never smoked. She has never used smokeless tobacco.  ETOH:   reports no history of alcohol use. Review Of Systems  GENERAL: No fever, chills or weight loss. EYES:  No  blurred vision, double vision   CARDIOVASCULAR: No chest pain or palpitations. RESPIRATORY:  No dyspnea or cough. GI:  See HPI  MUSCULOSKELETAL: No new painful or swollen joints or myalgias. :   No dysuria or hematuria. SKIN:  No rashes or jaundice. NEUROLOGIC:  No headaches or seizures, numbness or tingling of arms, or legs. PSYCH:  No anxiety or depression. ENDOCRINE:  No polyuria or polydipsia. BLOOD:  No anemia, bleeding disorder, blood or blood product transfusion. PHYSICAL EXAM:  BP (!) 96/53   Pulse 119   Temp 99 °F (37.2 °C) (Oral)   Resp 16   Ht 4' 11\" (1.499 m)   Wt 90 lb (40.8 kg)   LMP 2021   SpO2 94%   BMI 18.18 kg/m²     General appearance: No apparent distress, appears stated age and cooperative. HEENT: Normal cephalic, atraumatic without obvious deformity. Pupils equal, round, and reactive to light. Neck: Supple, with full range of motion.  No jugular venous distention. Trachea midline. Respiratory:  Normal respiratory effort. Clear to auscultation, bilaterally without Rales/Wheezes/Rhonchi. Cardiovascular: Tachycardia, regular rhythm without murmurs, rubs or gallops. Abdomen: Soft, diffusely tender, moderate distention, with active bowel sounds. Drainage tube on right with large amount of purulent drainage. Drainage tube left with small amount of clear gold/yellow fluid. Musculoskeletal: No clubbing, cyanosis or edema bilaterally. Skin: Pink, warm, dry. No rashes or lesions. Psychiatric: Alert and oriented, thought content appropriate, normal insight    Labs:   Recent Labs     07/10/21  0627   WBC 33.1*   HGB 12.0   HCT 38.7        Recent Labs     07/10/21  0627      K 4.0      CO2 27   BUN 7   CREATININE 0.5   CALCIUM 8.4*     Recent Labs     07/10/21  0627   AST 12   ALT 35   BILITOT 1.2   ALKPHOS 102     No results for input(s): INR in the last 72 hours. Radiology:   CT Abd/Pelvis W C 7/9/21  Impression   1. Interval development of large perihepatic collection with trapped air bubbles extending from dome of the liver through to the lower abdomen/upper pelvis, measures approximately 4.5 cm in its largest AP dimension. A second collection is seen in the    left lower quadrant, measures approximately 4.7 cm in largest AP dimension, approximately 13.5 cm in craniocaudad dimension and connects across the lower abdomen upper pelvis to the perihepatic collection, consistent with abscesses. Smaller collections    are seen in the pelvis centered at the periphery in a U shaped configuration, best demonstrated in the coronal plane and does not appear to communicate with the larger, aforementioned collection. 2.  Mesenteric induration and free fluid. 3.  .Moderate bilateral pleural effusions with adjacent atelectasis.        Code Status: Full Code    ASSESSMENT:    Large Intra-abdominal abscesses  Leukocytosis- WBC 33, Lactic acid normal 1.4  Chronic Chron's Moderate to Severe Colitis- with recent flare requiring hospitalization 2/2 loss of Humira  Elevated LFTs  Pleural Effusions  Hypoalbuminemia  Long term steroid use      PLAN:    IVF   NPO  IV ATBx per primary/GenSurg  ID has been consulted  Supportive care per primary  Prednisone and Humira currently on HOLD. Keep in mind has not received a dose of Humira since April   Dr. Karen Rivera requesting IM consult for concern of adrenal insufficiency/ withdrawal with abrupt stop in steroids. Steroids were started April 2021, at 20 mg or greater. Does she need to be on daily maintenance to prevent adrenal insufficiency. Will follow    Case reviewed and impression/plan reviewed in collaboration with Dr. Karen Rivera  Electronically signed by PRAVEENA Good CNP on 7/10/2021 at 9:20 AM    [facility]  Thank you for the consultation.

## 2021-07-10 NOTE — PROGRESS NOTES
Right sided alanna close drainage bag changed at this time. Had 800 ml of purulent yellow fluid in bag.

## 2021-07-10 NOTE — H&P
History & Physical        Patient:  Nataliia Thurston  YOB: 1990    MRN: [id number]   Acct:  [de-identified]   Primary Care Physician: Blake Syed MD       Reason for consult: medical management/steroid dosing    History of Present Illness:   History obtained from chart review and the patient. The patient is a 31 y.o. female who presented to [facility] with abd pain. Patient was just discharged from our service last week. She had acute Crohn's exacerbation. She was discharged on 21 on 20 mg BID  PO Prednison. Patient was supposed to start on Humira yesterday. Patient reported worsening abdominal pain on 21 early AM. She presented to ED where CT showed large perihepatic and smaller pelvic fluid collections. S/P 2 drains placement by IR    Past Medical History:        Diagnosis Date    Crohn's colitis [facility])        Past Surgical History:        Procedure Laterality Date    CT DRAIN SOFT TISSUE ABSCESS  2021    CT DRAIN SOFT TISSUE ABSCESS 2021 [facility] CT SCAN    CT DRAIN SOFT TISSUE ABSCESS  2021    CT DRAIN SOFT TISSUE ABSCESS 2021 [facility] CT SCAN    [place]  DELIVERY ONLY N/A 10/12/2018     SECTION performed by Roro Sheffield MD at [address] [place] [address] Medications:    Prior to Admission medications    Medication Sig Start Date End Date Taking? Authorizing Provider   predniSONE (DELTASONE) 20 MG tablet Take 1 tablet by mouth 2 times daily for 10 days 21 Yes Kalli Johns MD   hyoscyamine (OSCIMIN) 125 MCG TBDP dispersible tablet Take 1 tablet by mouth every 4 hours as needed (abd cramping) 21  Yes Kalli Johns MD   adalimumab (HUMIRA) 40 MG/0.8ML injection Inject 0.8 mLs into the skin once for 1 dose 21  Kalli Johns MD     Allergies:  Duricef [cefadroxil] and Penicillins    Social History:    reports that she has never smoked.  She has never used smokeless tobacco. She reports that she does not drink alcohol and does not use drugs. Family History:   Reviewed:       Problem Relation Age of Onset    Other Mother        Review of Systems:    Pertinent items are noted in HPI. Comprehensive review of systems was obtained . Review of Systems   Constitutional: Negative for fever and chills. no Fatigue. HENT: Negative for ear pain, sore throat, rhinorrhea and neck pain. Eyes: Negative for pain, discharge and visual disturbance. Respiratory: Negative for cough, shortness of breath. no  wheezing. Cardiovascular: Negative for chest pain, palpitations and leg swelling. Gastrointestinal: Negative for nausea, vomiting,    Genitourinary: Negative for dysuria,  difficulty urinating and pelvic pain. Musculoskeletal: . Negative for back pain, joint swelling and arthralgias. Skin: Negative for rash. Neurological: Negative for dizziness, seizures, syncope and headaches. Hematological: Negative for adenopathy. Psychiatric/Behavioral: Negative for suicidal ideas and dysphoric mood. The patient is not nervous/anxious. Physical Exam:  BP (!) 96/53   Pulse 119   Temp 99 °F (37.2 °C) (Oral)   Resp 16   Ht 4' 11\" (1.499 m)   Wt 90 lb (40.8 kg)   LMP 07/09/2021   SpO2 94%   BMI 18.18 kg/m²   General appearance:  No apparent distress, appears stated age and cooperative. HEENT:  Normal cephalic, atraumatic without obvious deformity. Pupils equal, round, and reactive to light. Extra ocular muscles intact. Conjunctivae/corneas clear. Neck: Supple, with full range of motion. No jugular venous distention. Trachea midline. Respiratory:  Normal respiratory effort. Decreased BS at bases bilaterally  without Rales/Wheezes/Rhonchi. Cardiovascular:  Regular rate and regular regular rhythm with normal S1/S2 without murmurs, rubs or gallops. Abdomen: Soft, diffusely tender,  distended with normal bowel sounds. Musculoskeletal:  No clubbing, cyanosis. No edema bilaterally. Full range of motion without deformity. Skin: Skin color, texture, turgor normal.  No rashes or lesions. Neurologic:  Neurovascularly intact without any focal sensory/motor deficits. grossly non-focal.  Psychiatric:  Alert and oriented, thought content appropriate, normal insight  Capillary Refill: Brisk,< 3 seconds   Peripheral Pulses: +2 palpable, equal bilaterally     Review of Labs and Diagnostic Testing:  Labs:     Recent Labs     07/09/21  0840 07/10/21  0627   WBC 37.1* 33.1*   HGB 12.9 12.0   HCT 42.1 38.7   * 306     Recent Labs     07/09/21  0840 07/10/21  0627    138   K 4.1 4.0   CL 98 101   CO2 27 27   BUN 10 7   CREATININE 0.3* 0.5   CALCIUM 8.8 8.4*     Recent Labs     07/09/21  0840 07/10/21  0627   AST 69* 12   ALT 79* 35   BILITOT 1.2 1.2   ALKPHOS 203* 102     No results found for: AMYLASE  No results for input(s): INR in the last 72 hours. No results for input(s): TROPONINT in the last 72 hours. No results for input(s): BNP in the last 72 hours. No results for input(s): LACTA in the last 72 hours. Lab Results   Component Value Date    PROCAL < 0.02 06/29/2021     No results found for: LABA1C  No results found for: TSH    Urinalysis:   Lab Results   Component Value Date    NITRU NEGATIVE 06/29/2021    WBCUA 0-2 06/29/2021    BACTERIA NONE SEEN 06/29/2021    RBCUA 10-15 06/29/2021    BLOODU LARGE 06/29/2021    GLUCOSEU NEGATIVE 06/29/2021     Radiology:   Reviewed: see report for details  CXR: I have reviewed the report  EKG:  No acute changes:   CT ABSCESS DRAINAGE SOFT TISSUE   Final Result      Successful, uncomplicated placement of 14 Nigerien drainage catheters into both sides of the abdomen. **This report has been created using voice recognition software. It may contain minor errors which are inherent in voice recognition technology. **               Final report electronically signed by Dr Lisa Berry on 7/9/2021 4:18 PM      CT ABSCESS DRAINAGE SOFT TISSUE   Final Result      Successful, uncomplicated placement of 14 [place] drainage catheters into both sides of the abdomen. **This report has been created using voice recognition software. It may contain minor errors which are inherent in voice recognition technology. **               Final report electronically signed by Dr Beto Luna on 7/9/2021 4:18 PM      CT GUIDED NEEDLE PLACEMENT   Final Result   Successful, uncomplicated placement of 14 [place] drainage catheters into both sides of the abdomen. **This report has been created using voice recognition software. It may contain minor errors which are inherent in voice recognition technology. **         Final report electronically signed by Dr Beto Luna on 7/9/2021 3:38 PM      CT GUIDED NEEDLE PLACEMENT   Final Result   Successful, uncomplicated placement of 14 [place]ian drainage catheters into both sides of the abdomen. **This report has been created using voice recognition software. It may contain minor errors which are inherent in voice recognition technology. **         Final report electronically signed by Dr Beto Luna on 7/9/2021 3:38 PM      CT ABDOMEN PELVIS W IV CONTRAST Additional Contrast? None   Final Result   1. Interval development of large perihepatic collection with trapped air bubbles extending from dome of the liver through to the lower abdomen/upper pelvis, measures approximately 4.5 cm in its largest AP dimension. A second collection is seen in the    left lower quadrant, measures approximately 4.7 cm in largest AP dimension, approximately 13.5 cm in craniocaudad dimension and connects across the lower abdomen upper pelvis to the perihepatic collection, consistent with abscesses.  Smaller collections    are seen in the pelvis centered at the periphery in a U shaped configuration, best demonstrated in the coronal plane and does not appear to communicate with the larger, aforementioned collection. 2.  Mesenteric induration and free fluid. 3.  .Moderate bilateral pleural effusions with adjacent atelectasis. **This report has been created using voice recognition software. It may contain minor errors which are inherent in voice recognition technology. **      Final report electronically signed by Dr. Debbie Zavala on 7/9/2021 10:53 AM      XR ACUTE ABD SERIES CHEST 1 VW   Final Result   1. Small right effusion with right base consolidation. .   2. Unremarkable supine and upright views of the abdomen. 3. Hepatomegaly. **This report has been created using voice recognition software. It may contain minor errors which are inherent in voice recognition technology. **      Final report electronically signed by Dr. Debbie Zavala on 7/9/2021 9:26 AM      CT ABSCESS DRAINAGE W CATH PLACEMENT S&I    (Results Pending)       Code Status: Full Code    Assessment/Plan:  Active Problems:    Intra-abdominal abscess ([address]  Resolved Problems:    * No resolved hospital problems.  *     1-Crohn's disease   Off Humira since Feb sec to loss of insurance   Recent exacerbation: was on Prednisone 20 mg PO BID   Has been on steroids since April 4154   Complicated with intra-abdominal abscesses   *will do steroids IV: no need stress dose steroids at this point   *will check AM cortisol   *May need steroid stress dose depending of clinical course/need for surgery   *will need vitamin D/Calcium  on discharge to minimize risk of complications       Claudene Mola, MD on 7/10/2021 at 11:26 AM  Admitting Hospitalist

## 2021-07-10 NOTE — PROGRESS NOTES
Elin Torres MD  Daily Progress Note    Pt Name: [address] Record Number: [id number]  Date of Birth 1990   Today's Date: 7/10/2021    Hospital day # 1     ASSESSMENT   1. Large intra-abdominal abscess status post percutaneous drain placement x2  2. Bilateral pleural effusions  3. Leukocytosis  4. Thrombocytosis  5. Elevated liver enzymes  6. Crohn's disease  7.  has a past medical history of Crohn's colitis ([facility] [address] PLAN   1. Status post percutaneous drain placement x2.  2. IV antibiotics  3. Await final culture results  4. Infectious disease following  5. Pain and nausea control  6. Leukocytosis and thrombocytosis improved. 7. Tachycardia slightly better. 8. Holding prednisone and Humira  9. GI consultation has known to Dr. Aida Ponce  10. A.m. labs  11. N.p.o. with ice chips only today  12. If fails percutaneous drains then exploration with washout  13. SCDs for DVT prophylaxis  SUBJECTIVE   Chief complaint: Abdominal painimproved    Chart reviewed. Stable overnight. Patient states she is feeling better after drains have been placed. Another 600 mL out of right drain at bedside. Left drain with less output. Purulent-like fluid extracted from drains. Patient feels less distended and bloated. Generalized tenderness improved per patient. Tachycardia slightly better. Low-grade fever at 99 °F this morning. No urinary complaints. No nausea or vomiting. Denies chest pain or shortness of breath. No lightheadedness or dizziness.   CURRENT MEDICATIONS   Scheduled Meds:   sodium chloride flush  5-40 mL Intravenous 2 times per day    metroNIDAZOLE  500 mg Intravenous Q8H    ciprofloxacin  400 mg Intravenous Q12H    famotidine (PEPCID) injection  20 mg Intravenous BID     Continuous Infusions:   sodium chloride      sodium chloride 100 mL/hr at 07/09/21 3317     PRN Meds:.sodium chloride flush, sodium chloride, ondansetron **OR** ondansetron, morphine **OR** morphine  OBJECTIVE   CURRENT VITALS:  height is 4' 11\" (1.499 m) and weight is 90 lb (40.8 kg). Her oral temperature is 99 °F (37.2 °C). Her blood pressure is 96/53 (abnormal) and her pulse is 119. Her respiration is 16 and oxygen saturation is 94%. Temperature Range (24h):Temp: 99 °F (37.2 °C) Temp  Av.6 °F (37 °C)  Min: 98.3 °F (36.8 °C)  Max: 99 °F (37.2 °C)  BP Range (42K): Systolic (76UPX), MVC:317 , Min:96 , QMB:771     Diastolic (64VIP), HPR:78, Min:53, Max:85    Pulse Range (24h): Pulse  Av  Min: 101  Max: 135  Respiration Range (24h): Resp  Av.4  Min: 16  Max: 38  Current Pulse Ox (24h):  SpO2: 94 %  Pulse Ox Range (24h):  SpO2  Av.7 %  Min: 92 %  Max: 97 %  Oxygen Amount and Delivery:    Incentive Spirometry Tx:            GENERAL: alert, cooperative, no distress  SKIN: Skin color, texture, turgor normal. No rashes or lesions. HEENT: Head is normocephalic, atraumatic. EOMI, PERRLA. NECK: Supple, symmetrical, trachea midline, no adenopathy, thyroid symmetric, not enlarged and no tenderness, skin normal.  LUNGS: clear to ausculation, without wheezes, rales or rhonci  HEART: Tachycardia with regular rhythm  ABDOMEN: soft, generalized tenderness but improved, mildly distended, hypoactive bowel sounds present. NEUROLOGIC: There are no focalizing motor or sensory deficits. CN II-XII are grossly intact. EXTREMITIES: no cyanosis, no clubbing and no edema.     In: -   Out: 450 [Drains:450]  Date 07/10/21 0000 - 07/10/21 2356   Shift  2471-0536 3108-3386 24 Hour Total   INTAKE   Shift Total(mL/kg)       OUTPUT   Drains(mL/kg) 450(11)   450(11)   Shift Total(mL/kg) 450(11)   450(11)   Weight (kg) 40.8 40.8 40.8 40.8     LABS     Recent Labs     21  0840 07/10/21  0627   WBC 37.1* 33.1*   HGB 12.9 12.0   HCT 42.1 38.7   * 306    138   K 4.1  --    CL 98 101   CO2 27 27   BUN 10 7   CREATININE 0.3* 0.5   CALCIUM 8.8 8.4*      No the wire and was coiled within the abscess pocket. The retaining suture was then locked in the standard fashion. Catheter was then hooked to a Prabhu-Close drainage bag and the catheter was flushed several times with    sterile saline. The catheter was stabilized to the skin with a Percu-Stay device. A sample of the fluid was sent to laboratory for culture and sensitivity testing.                   Impression       Successful, uncomplicated placement of 14 [place] drainage catheters into both sides of the abdomen.                   **This report has been created using voice recognition software.  It may contain minor errors which are inherent in voice recognition technology. **                   Final report electronically signed by Dr Zackary Bailey on 7/9/2021 4:18 PM         Electronically signed by Norma Junior MD on 7/10/2021 at 7:37 AM

## 2021-07-11 LAB
ANION GAP SERPL CALCULATED.3IONS-SCNC: 9 MEQ/L (ref 8–16)
BUN BLDV-MCNC: 12 MG/DL (ref 7–22)
CALCIUM SERPL-MCNC: 8.4 MG/DL (ref 8.5–10.5)
CHLORIDE BLD-SCNC: 101 MEQ/L (ref 98–111)
CO2: 26 MEQ/L (ref 23–33)
CORTISOL COLLECTION INFO: NORMAL
CORTISOL: 13.08 UG/DL
CREAT SERPL-MCNC: 0.3 MG/DL (ref 0.4–1.2)
ERYTHROCYTE [DISTWIDTH] IN BLOOD BY AUTOMATED COUNT: 15.1 % (ref 11.5–14.5)
ERYTHROCYTE [DISTWIDTH] IN BLOOD BY AUTOMATED COUNT: 45.9 FL (ref 35–45)
GFR SERPL CREATININE-BSD FRML MDRD: > 90 ML/MIN/1.73M2
GLUCOSE BLD-MCNC: 119 MG/DL (ref 70–108)
HCT VFR BLD CALC: 34.5 % (ref 37–47)
HEMOGLOBIN: 10.5 GM/DL (ref 12–16)
MCH RBC QN AUTO: 25.5 PG (ref 26–33)
MCHC RBC AUTO-ENTMCNC: 30.4 GM/DL (ref 32.2–35.5)
MCV RBC AUTO: 83.7 FL (ref 81–99)
PLATELET # BLD: 240 THOU/MM3 (ref 130–400)
PMV BLD AUTO: 9.5 FL (ref 9.4–12.4)
POTASSIUM SERPL-SCNC: 4 MEQ/L (ref 3.5–5.2)
RBC # BLD: 4.12 MILL/MM3 (ref 4.2–5.4)
SODIUM BLD-SCNC: 136 MEQ/L (ref 135–145)
WBC # BLD: 25.7 THOU/MM3 (ref 4.8–10.8)

## 2021-07-11 PROCEDURE — 1200000000 HC SEMI PRIVATE

## 2021-07-11 PROCEDURE — 36415 COLL VENOUS BLD VENIPUNCTURE: CPT

## 2021-07-11 PROCEDURE — 99232 SBSQ HOSP IP/OBS MODERATE 35: CPT | Performed by: OPHTHALMOLOGY

## 2021-07-11 PROCEDURE — 85027 COMPLETE CBC AUTOMATED: CPT

## 2021-07-11 PROCEDURE — 99232 SBSQ HOSP IP/OBS MODERATE 35: CPT | Performed by: SURGERY

## 2021-07-11 PROCEDURE — 2500000003 HC RX 250 WO HCPCS: Performed by: PHYSICIAN ASSISTANT

## 2021-07-11 PROCEDURE — 6360000002 HC RX W HCPCS: Performed by: PHYSICIAN ASSISTANT

## 2021-07-11 PROCEDURE — 2580000003 HC RX 258: Performed by: PHYSICIAN ASSISTANT

## 2021-07-11 PROCEDURE — 80048 BASIC METABOLIC PNL TOTAL CA: CPT

## 2021-07-11 PROCEDURE — 6360000002 HC RX W HCPCS: Performed by: OPHTHALMOLOGY

## 2021-07-11 PROCEDURE — 82533 TOTAL CORTISOL: CPT

## 2021-07-11 RX ORDER — METHYLPREDNISOLONE SODIUM SUCCINATE 40 MG/ML
30 INJECTION, POWDER, LYOPHILIZED, FOR SOLUTION INTRAMUSCULAR; INTRAVENOUS DAILY
Status: DISCONTINUED | OUTPATIENT
Start: 2021-07-12 | End: 2021-07-15

## 2021-07-11 RX ADMIN — MORPHINE SULFATE 4 MG: 4 INJECTION, SOLUTION INTRAMUSCULAR; INTRAVENOUS at 11:37

## 2021-07-11 RX ADMIN — METRONIDAZOLE 500 MG: 500 INJECTION, SOLUTION INTRAVENOUS at 20:14

## 2021-07-11 RX ADMIN — SODIUM CHLORIDE: 9 INJECTION, SOLUTION INTRAVENOUS at 04:08

## 2021-07-11 RX ADMIN — FAMOTIDINE 20 MG: 10 INJECTION INTRAVENOUS at 08:36

## 2021-07-11 RX ADMIN — METHYLPREDNISOLONE SODIUM SUCCINATE 40 MG: 40 INJECTION, POWDER, FOR SOLUTION INTRAMUSCULAR; INTRAVENOUS at 08:36

## 2021-07-11 RX ADMIN — CIPROFLOXACIN 400 MG: 2 INJECTION, SOLUTION INTRAVENOUS at 08:38

## 2021-07-11 RX ADMIN — CIPROFLOXACIN 400 MG: 2 INJECTION, SOLUTION INTRAVENOUS at 21:48

## 2021-07-11 RX ADMIN — METRONIDAZOLE 500 MG: 500 INJECTION, SOLUTION INTRAVENOUS at 13:48

## 2021-07-11 RX ADMIN — MORPHINE SULFATE 2 MG: 2 INJECTION, SOLUTION INTRAMUSCULAR; INTRAVENOUS at 20:23

## 2021-07-11 RX ADMIN — FAMOTIDINE 20 MG: 10 INJECTION INTRAVENOUS at 20:14

## 2021-07-11 RX ADMIN — MORPHINE SULFATE 4 MG: 4 INJECTION, SOLUTION INTRAMUSCULAR; INTRAVENOUS at 04:21

## 2021-07-11 RX ADMIN — METRONIDAZOLE 500 MG: 500 INJECTION, SOLUTION INTRAVENOUS at 04:08

## 2021-07-11 ASSESSMENT — PAIN SCALES - GENERAL
PAINLEVEL_OUTOF10: 5
PAINLEVEL_OUTOF10: 5
PAINLEVEL_OUTOF10: 4
PAINLEVEL_OUTOF10: 7
PAINLEVEL_OUTOF10: 0
PAINLEVEL_OUTOF10: 4
PAINLEVEL_OUTOF10: 1

## 2021-07-11 NOTE — PROGRESS NOTES
Irene Solis MD  Daily Progress Note    Pt Name: [address] Record Number: [id number]  Date of Birth 1990   Today's Date: 7/11/2021    Hospital day # 2     ASSESSMENT   1. Large intra-abdominal abscess status post percutaneous drain placement x2  2. Bilateral pleural effusions  3. Leukocytosis  4. Thrombocytosis  5. Elevated liver enzymes  6. Crohn's disease   has a past medical history of Crohn's colitis ([facility] [address] PLAN   1. Status post percutaneous drain placement x2.  2. IV antibiotics  3. Await final culture results  4. Infectious disease following  5. Pain and nausea control  6. Leukocytosis and thrombocytosis improved. 7. Tachycardia resolved with vital signs stable and afebrile. 8. Holding prednisone and Humira  9. GI following - known to Dr. Carolin Tinajero  10. A.m. labs  11. N.p.o. with ice chips and sips only today  12. If fails percutaneous drains then exploration with washout but clinically looks a lot better today. Plan at this time would be repeat CT abdomen/pelvis tomorrow for comparison of admitting CT scan and see how the drains have been working. 13. SCDs for DVT prophylaxis  14. Medicine consulted for concern for adrenal insufficiency. Patient back on steroids. Trying to wean off of steroids as soon as possible as this is going to drastically decrease chances of patient healing intra-abdominally  SUBJECTIVE   Chief complaint: Abdominal painimproved    Chart reviewed. Stable overnight. Patient states she is feeling better after drains have been placed now for more than 24 hours. Most of the drainage continues in the right percutaneous drain. Left drain with less output. Purulent-like fluid extracted from the right drain. Left drain looks more serous. Patient feels less distended and bloated. Fevers have resolved. Tachycardia is also resolved. Generalized tenderness improved per patient. No urinary complaints.   No nausea or vomiting. Denies chest pain or shortness of breath. No lightheadedness or dizziness. CURRENT MEDICATIONS   Scheduled Meds:   enoxaparin  40 mg Subcutaneous Daily    methylPREDNISolone  40 mg Intravenous Daily    sodium chloride flush  5-40 mL Intravenous 2 times per day    metroNIDAZOLE  500 mg Intravenous Q8H    ciprofloxacin  400 mg Intravenous Q12H    famotidine (PEPCID) injection  20 mg Intravenous BID     Continuous Infusions:   sodium chloride      sodium chloride 100 mL/hr at 21 0408     PRN Meds:.sodium chloride flush, sodium chloride, ondansetron **OR** ondansetron, morphine **OR** morphine  OBJECTIVE   CURRENT VITALS:  height is 4' 11\" (1.499 m) and weight is 90 lb (40.8 kg). Her oral temperature is 97.5 °F (36.4 °C). Her blood pressure is 99/58 (abnormal) and her pulse is 88. Her respiration is 14 and oxygen saturation is 95%. Temperature Range (24h):Temp: 97.5 °F (36.4 °C) Temp  Av °F (36.7 °C)  Min: 97.4 °F (36.3 °C)  Max: 99 °F (37.2 °C)  BP Range (43S): Systolic (15EMI), IIQ:43 , Min:93 , CHQ:489     Diastolic (38CZD), DWH:64, Min:56, Max:67    Pulse Range (24h): Pulse  Av.8  Min: 72  Max: 121  Respiration Range (24h): Resp  Av  Min: 14  Max: 18  Current Pulse Ox (24h):  SpO2: 95 %  Pulse Ox Range (24h):  SpO2  Av.7 %  Min: 94 %  Max: 97 %  Oxygen Amount and Delivery:    Incentive Spirometry Tx:            GENERAL: alert, cooperative, no distress  SKIN: Skin color, texture, turgor normal. No rashes or lesions. HEENT: Head is normocephalic, atraumatic. EOMI, PERRLA. NECK: Supple, symmetrical, trachea midline, no adenopathy, thyroid symmetric, not enlarged and no tenderness, skin normal.  LUNGS: clear to ausculation, without wheezes, rales or rhonci  HEART: Regular rate and regular rhythm  ABDOMEN: soft, generalized tenderness much improved, mildly distended, hypoactive bowel sounds present. NEUROLOGIC: There are no focalizing motor or sensory deficits.  CN II-XII are grossly intact. EXTREMITIES: no cyanosis, no clubbing and no edema. In: 1510 [I.V.:1510]  Out: -   Date 07/11/21 0000 - 07/11/21 2359   Shift 8882-9431 2727-2992 6202-0697 24 Hour Total   INTAKE   I.V.(mL/kg) 948(23.2)   948(23.2)   Shift Total(mL/kg) 948(23.2)   948(23.2)   OUTPUT   Shift Total(mL/kg)       Weight (kg) 40.8 40.8 40.8 40.8     LABS     Recent Labs     07/09/21  0840 07/10/21  0627 07/11/21  0658   WBC 37.1* 33.1* 25.7*   HGB 12.9 12.0 10.5*   HCT 42.1 38.7 34.5*   * 306 240    138 136   K 4.1 4.0 4.0   CL 98 101 101   CO2 27 27 26   BUN 10 7 12   CREATININE 0.3* 0.5 0.3*   CALCIUM 8.8 8.4* 8.4*      No results for input(s): PTT, INR in the last 72 hours. Invalid input(s): PT  Recent Labs     07/09/21  0840 07/10/21  0627   AST 69* 12   ALT 79* 35   BILITOT 1.2 1.2     No results for input(s): TROPONINT in the last 72 hours. RADIOLOGY     Narrative   CT-GUIDED ABSCESS DRAINAGE       PERFORMED BY: Jason Puckett. Rodri Resendez M.D.       DRAINAGE SITE: Fluid collections throughout the abdomen. One is within the right side and one is within the left side of the abdomen.       APPROACH: Anterior right side of the abdomen. Anterior left side of the abdomen.       CATHETER: 214 [place] multipurpose drainage catheters.       FLUID OBTAINED: Yellow-colored fluid on the right and reddish colored fluid on the left was aspirated from the abdomen. Specimens were sent to the laboratory for further analysis.       SEDATION: Versed 1.5 mg and fentanyl 75 mcg , IV; the patient was sedated for 29 minutes during this procedure and monitored with EKG and pulse ox monitoring devices by a registered nurse. Face-to-face time with the patient was 29 minutes.       PROCEDURE: Signed informed consent was obtained prior to performing this procedure.  The patient was placed on the CT scanner and sedated, as indicated above.        ALL CT SCANS AT THIS FACILITY use dose modulation, iterative reconstruction, and/or weight-based dosing when appropriate to reduce radiation dose to as low as reasonably achievable.       CT images were initially obtained to determine appropriate puncture sites. The skin was marked, prepped, and draped in a sterile fashion. Following local anesthesia and utilizing aseptic technique, needles were successfully passed into the fluid    collections. A small amount of fluid was aspirated to confirm appropriate needle position. A guidewire was then passed through the needle followed by insertion of progressively larger dilators up to an port [place] Melanie size. An 15 [place] multi-side-hole    drainage catheter was then passed over the wire and was coiled within the abscess pocket. The retaining suture was then locked in the standard fashion. Catheter was then hooked to a Prabhu-Close drainage bag and the catheter was flushed several times with    sterile saline. The catheter was stabilized to the skin with a Percu-Stay device. A sample of the fluid was sent to laboratory for culture and sensitivity testing.                   Impression       Successful, uncomplicated placement of 14 [place] drainage catheters into both sides of the abdomen.                   **This report has been created using voice recognition software.  It may contain minor errors which are inherent in voice recognition technology. **                   Final report electronically signed by Dr Kentrell Zafar on 7/9/2021 4:18 PM         Electronically signed by Beverly Parham MD on 7/11/2021 at 10:29 AM

## 2021-07-11 NOTE — PROGRESS NOTES
Patient educated on importance of washing daily with CHG soap to prevent infection. CHG soap brought to patient, will wash up this morning. Patient also educated on incentive spirometer and importance of deep breathing and coughing. Encouraged patient to ambulate in hallways with spouse.  Patient verbalizes understanding and practices spirometer correctly

## 2021-07-11 NOTE — PLAN OF CARE
Problem: Pain:  Goal: Pain level will decrease  Description: Pain level will decrease  7/10/2021 2134 by Chintan Solano RN  Outcome: Ongoing   Pain Assessment: 0-10  Pain Level: 0       Is pain goal met at this time? Yes, patient denies pain and need for pain medication at this time. Nonpharm Pain interventions: Ice to affected area with effective results noted. Problem: Infection - Surgical Site:  Goal: Will show no infection signs and symptoms  Description: Will show no infection signs and symptoms  7/10/2021 2134 by Chintan Solano RN  Outcome: Ongoing  Patient's bilateral alanna close drain dressing sites area clean, dry, and intact with no issues noted. Problem: GI  Goal: No bowel complications  9/34/7329 2134 by Chintan Solano RN  Outcome: Ongoing  Active bowel sounds to all quadrants. No bowel movement today. Abdomen is distended and taut. Problem:   Goal: Adequate urinary output  7/10/2021 2134 by Chintan Solano RN  Outcome: Ongoing    Patient voiding with no issues. Problem: Nutrition  Goal: Optimal nutrition therapy  Outcome: Ongoing   Patient NPO with exception to ice and sips. Patient with IVF infusing. No nausea noted. Care plan reviewed with patient. Patient verbalize understanding of the plan of care and contribute to goal setting.     Electronically signed by Chintan Solano RN on 7/10/2021 at 9:38 PM

## 2021-07-11 NOTE — PROGRESS NOTES
Gastroenterology Progress Note:     Patient Name:  Pennie Lockwood   MRN: [id number]  [id number]  YOB: 1990  Admit Date: 7/9/2021  8:17 AM  Primary Care Physician: Iman Mendoza MD   5K-11/011-A     Patient seen and examined. 24 hours events and chart reviewed. Subjective:   Patient resting in bed. RN and guest/family member at bedside. Rates pain 4/10 today, somewhat improved. No BM in last 24 hours. No N/V, F/C. VSS. Tachycardia resolved. Planning CT in a.m. to reassess abscesses. Objective:  BP (!) 99/58   Pulse 88   Temp 97.5 °F (36.4 °C) (Oral)   Resp 14   Ht 4' 11\" (1.499 m)   Wt 90 lb (40.8 kg)   LMP 07/09/2021   SpO2 95%   BMI 18.18 kg/m²     Physical Exam:    General:  Nourished in no distress  HEENT: Atraumatic, normocephalic. Moist oral mucous membranes. Neck: Supple without adenopathy. Trachea midline. CV: Heart RRR, no murmurs, rubs, gallops. Resp: Even, easy without cough or accessory use. Lungs clear to ascultation bilaterally. Abd: Round, distended, tender to palpation diffusely. No hepatosplenomegaly or mass present. Active bowel sounds heard. Ext:  Without cyanosis, clubbing, edema. Skin: Pink, warm, dry  Neuro:  Alert, oriented x 3 with no obvious deficits.        Rectal: deferred    Labs:   CBC:   Lab Results   Component Value Date    WBC 25.7 07/11/2021    HGB 10.5 07/11/2021    HCT 34.5 07/11/2021    MCV 83.7 07/11/2021     07/11/2021     BMP:   Lab Results   Component Value Date     07/11/2021    K 4.0 07/11/2021    K 4.1 07/09/2021     07/11/2021    CO2 26 07/11/2021    BUN 12 07/11/2021    CREATININE 0.3 07/11/2021    CALCIUM 8.4 07/11/2021     PT/INR: No results found for: PROTIME, INR  LFTs:   Lab Results   Component Value Date    ALKPHOS 102 07/10/2021    ALT 35 07/10/2021    AST 12 07/10/2021    BILITOT 1.2 07/10/2021    LABALBU 1.9 07/10/2021    LIPASE 23.2 06/29/2021     Significant Diagnostic Studies:   No new    Current Meds:  Scheduled Meds:   enoxaparin  40 mg Subcutaneous Daily    methylPREDNISolone  40 mg Intravenous Daily    sodium chloride flush  5-40 mL Intravenous 2 times per day    metroNIDAZOLE  500 mg Intravenous Q8H    ciprofloxacin  400 mg Intravenous Q12H    famotidine (PEPCID) injection  20 mg Intravenous BID     Continuous Infusions:   sodium chloride      sodium chloride 100 mL/hr at 07/11/21 0408       Assessment:    31 y.o. female who we are following for medical management of Crohn's Colitis with abscess. PMHx of Crohn's Colitis, follows with Dr. Pardeep Galvan outpatient. Hospitalization with DC 6 prior to this admission for Crohn's colitis flare 2/2 to loss of Humira. Responded well inpatient to Cipro, Flagyl, bowel rest, IV steroids. Discharged 7/4/21. Returned to the ED 7/9/21 for increasing abdominal pain x1 day PTA. CT A/P revealed large intra-abdominal abscesses. Admitted under Dr. Yu Sorensen. Underwent IR percutaneous drain placement x2 on 7/9/21 with large purulent output of right drain 7/10/21. IM consulted for concern for possible adrenal insufficiency with abrupt stop in steroids. IV Solumedrol restarted 7/10/21. AM Cortisol 13.08. Large Intra-abdominal abscesses  Leukocytosis- WBC 33 on admit, 25.7 today  Chronic Chron's Moderate to Severe Colitis- with recent flare requiring hospitalization 2/2 loss of Humira  Elevated LFTs  Pleural Effusions  Hypoalbuminemia  Long term steroid use- Started April 2021 for active Crohn's with loss of  Humira awaiting patient assistance which was approved 5/2021and received first home SQ 7/9 (not given as came to ED for increasing pain)    Plan:    IVF   NPO  IV ATBx per primary/GenSurg  Follow culture results  Pepcid BID  Solumedrol IV per IM, trying to wean   ID has been consulted  Supportive care per primary  Prednisone and Humira currently on HOLD. Keep in mind has not received a dose of Humira since April.    IM following for risk of adrenal insufficiency/luis steroid management, for sudden withdrawal of steroids.       Will follow    Case reviewed and impression/plan reviewed in collaboration with Dr. Mckenzie Wagner  Electronically signed by PRAVEENA Zamarripa CNP on 7/11/2021 at 10:48 AM    [facility]

## 2021-07-11 NOTE — PROGRESS NOTES
Hospitalist Progress Note    Patient:  Franck Carr      Unit/Bed:5K-11/011-A    YOB: 1990    MRN: [id number]       Acct: [de-identified]     PCP: Carina Vasquez MD    Date of Admission: 7/9/2021  Reason for consult: medical management/steroid dosing     History of Present Illness:   History obtained from chart review and the patient.     The patient is a 31 y.o. female who presented to [address] [place] [address] with abd pain. Patient was just discharged from our service last week. She had acute Crohn's exacerbation. She was discharged on 7/4/21 on 20 mg BID  PO Prednison. Patient was supposed to start on Humira yesterday. Patient reported worsening abdominal pain on 7/9/21 early AM. She presented to ED where CT showed large perihepatic and smaller pelvic fluid collections.  S/P 2 drains placement by IR   Assessment/Plan:  1-Crohn's disease              Off Humira since Feb sec to loss of insurance              Recent exacerbation: was on Prednisone 20 mg PO BID              Has been on steroids since April 2021              Complicated with intra-abdominal abscesses              *will do steroids IV: no need stress dose steroids at this point              *  AM cortisol: 13.08: will not need stress dose steroids              *was on Prednison 20mg BID, one day of sulmedrol 40/d   *Tapre steroids: will try 30/day               *will need vitamin D/Calcium  on discharge to minimize risk of complications        Subjective (past 24 hours)  abd pain  Diet:  Diet NPO Exceptions are: Ice Chips, Sips of Water with Meds, Sips of Clear Liquids      Medications:  Scheduled Meds:   enoxaparin  40 mg Subcutaneous Daily    methylPREDNISolone  40 mg Intravenous Daily    sodium chloride flush  5-40 mL Intravenous 2 times per day    metroNIDAZOLE  500 mg Intravenous Q8H    ciprofloxacin  400 mg Intravenous Q12H    famotidine (PEPCID) injection  20 mg Intravenous BID     Continuous Infusions:   sodium chloride      sodium chloride 100 mL/hr at 07/11/21 0408     PRN Meds:sodium chloride flush, sodium chloride, ondansetron **OR** ondansetron, morphine **OR** morphine    Objective:    Review of Labs and Diagnostic Testing:  Labs:     Recent Labs     07/09/21  0840 07/10/21  0627 07/11/21  0658   WBC 37.1* 33.1* 25.7*   HGB 12.9 12.0 10.5*   HCT 42.1 38.7 34.5*   * 306 240     Recent Labs     07/09/21  0840 07/10/21  0627 07/11/21  0658    138 136   K 4.1 4.0 4.0   CL 98 101 101   CO2 27 27 26   BUN 10 7 12   CREATININE 0.3* 0.5 0.3*   CALCIUM 8.8 8.4* 8.4*     Recent Labs     07/09/21  0840 07/10/21  0627   AST 69* 12   ALT 79* 35   BILITOT 1.2 1.2   ALKPHOS 203* 102     No results found for: AMYLASE  No results for input(s): INR in the last 72 hours. No results for input(s): TROPONINT in the last 72 hours. No results for input(s): BNP in the last 72 hours. No results for input(s): LACTA in the last 72 hours. Lab Results   Component Value Date    PROCAL < 0.02 06/29/2021     No results found for: LABA1C    Urinalysis:   Lab Results   Component Value Date    NITRU NEGATIVE 07/09/2021    WBCUA 5-9 07/09/2021    BACTERIA FEW 07/09/2021    RBCUA 3-5 07/09/2021    BLOODU LARGE 07/09/2021    GLUCOSEU NEGATIVE 07/09/2021     Radiology:   Reviewed: see report for details  CXR: I have reviewed the CXR  EKG:  No acute changes:  CT ABSCESS DRAINAGE SOFT TISSUE   Final Result      Successful, uncomplicated placement of 14 [place] drainage catheters into both sides of the abdomen. **This report has been created using voice recognition software. It may contain minor errors which are inherent in voice recognition technology. **               Final report electronically signed by Dr Jan Nguyen on 7/9/2021 4:18 PM      CT ABSCESS DRAINAGE SOFT TISSUE   Final Result      Successful, uncomplicated placement of 14 [place] drainage catheters into both sides of the abdomen.                **This report has been created using voice recognition software. It may contain minor errors which are inherent in voice recognition technology. **               Final report electronically signed by Dr Ben Ann on 7/9/2021 4:18 PM      CT GUIDED NEEDLE PLACEMENT   Final Result   Successful, uncomplicated placement of 14 English drainage catheters into both sides of the abdomen. **This report has been created using voice recognition software. It may contain minor errors which are inherent in voice recognition technology. **         Final report electronically signed by Dr Ben Ann on 7/9/2021 3:38 PM      CT GUIDED NEEDLE PLACEMENT   Final Result   Successful, uncomplicated placement of 14 English drainage catheters into both sides of the abdomen. **This report has been created using voice recognition software. It may contain minor errors which are inherent in voice recognition technology. **         Final report electronically signed by Dr Ben Ann on 7/9/2021 3:38 PM      CT ABDOMEN PELVIS W IV CONTRAST Additional Contrast? None   Final Result   1. Interval development of large perihepatic collection with trapped air bubbles extending from dome of the liver through to the lower abdomen/upper pelvis, measures approximately 4.5 cm in its largest AP dimension. A second collection is seen in the    left lower quadrant, measures approximately 4.7 cm in largest AP dimension, approximately 13.5 cm in craniocaudad dimension and connects across the lower abdomen upper pelvis to the perihepatic collection, consistent with abscesses. Smaller collections    are seen in the pelvis centered at the periphery in a U shaped configuration, best demonstrated in the coronal plane and does not appear to communicate with the larger, aforementioned collection. 2.  Mesenteric induration and free fluid. 3.  .Moderate bilateral pleural effusions with adjacent atelectasis.             **This report has been created using voice recognition software. It may contain minor errors which are inherent in voice recognition technology. **      Final report electronically signed by Dr. Christoph Montero on 7/9/2021 10:53 AM      XR ACUTE ABD SERIES CHEST 1 VW   Final Result   1. Small right effusion with right base consolidation. .   2. Unremarkable supine and upright views of the abdomen. 3. Hepatomegaly. **This report has been created using voice recognition software. It may contain minor errors which are inherent in voice recognition technology. **      Final report electronically signed by Dr. Christoph Montero on 7/9/2021 9:26 AM      CT ABSCESS DRAINAGE W CATH PLACEMENT S&I    (Results Pending)       Physical Exam:  BP (!) 99/58   Pulse 88   Temp 97.5 °F (36.4 °C) (Oral)   Resp 14   Ht 4' 11\" (1.499 m)   Wt 90 lb (40.8 kg)   LMP 07/09/2021   SpO2 95%   BMI 18.18 kg/m²   General appearance - alert, well appearing, and in no distress   Chest - clear to auscultation, no wheezes, rales or rhonchi, symmetric air entry   Distant Breath Sounds: No   Heart - normal rate, regular rhythm, normal S1, S2, no murmurs, rubs, clicks or gallops   Abdomen - soft, tender, nondistended, no masses or organomegaly   Obese: No; Protuberant: Bowel sounds heard  Neurological - A&O 3 normal speech, no focal findings or movement disorder noted   Musculoskeletal - no joint tenderness, deformity or swelling   Extremities - peripheral pulses normal, no pedal edema, no clubbing or cyanosis  24 hour intake/output:    Intake/Output Summary (Last 24 hours) at 7/11/2021 1139  Last data filed at 7/11/2021 0408  Gross per 24 hour   Intake 2410 ml   Output 70 ml   Net 2340 ml     Assessment/Plan:    Active Problems:    Intra-abdominal abscess (HCC)  Resolved Problems:    * No resolved hospital problems.  *      Code Status: Full Code    Khadijah Willard MD on 7/11/2021 at 11:39 AM  Rounding Hospitalist

## 2021-07-12 ENCOUNTER — APPOINTMENT (OUTPATIENT)
Dept: CT IMAGING | Age: 31
DRG: 356 | End: 2021-07-12
Payer: COMMERCIAL

## 2021-07-12 LAB
AEROBIC CULTURE: ABNORMAL
ANAEROBIC CULTURE: ABNORMAL
ANAEROBIC CULTURE: ABNORMAL
ANION GAP SERPL CALCULATED.3IONS-SCNC: 11 MEQ/L (ref 8–16)
BUN BLDV-MCNC: 14 MG/DL (ref 7–22)
CALCIUM SERPL-MCNC: 8.2 MG/DL (ref 8.5–10.5)
CHLORIDE BLD-SCNC: 101 MEQ/L (ref 98–111)
CO2: 24 MEQ/L (ref 23–33)
CREAT SERPL-MCNC: 0.5 MG/DL (ref 0.4–1.2)
ERYTHROCYTE [DISTWIDTH] IN BLOOD BY AUTOMATED COUNT: 15 % (ref 11.5–14.5)
ERYTHROCYTE [DISTWIDTH] IN BLOOD BY AUTOMATED COUNT: 45.8 FL (ref 35–45)
GFR SERPL CREATININE-BSD FRML MDRD: > 90 ML/MIN/1.73M2
GLUCOSE BLD-MCNC: 106 MG/DL (ref 70–108)
GLUCOSE BLD-MCNC: 136 MG/DL (ref 70–108)
GRAM STAIN RESULT: ABNORMAL
GRAM STAIN RESULT: ABNORMAL
HCT VFR BLD CALC: 34.5 % (ref 37–47)
HEMOGLOBIN: 10.4 GM/DL (ref 12–16)
MCH RBC QN AUTO: 25.5 PG (ref 26–33)
MCHC RBC AUTO-ENTMCNC: 30.1 GM/DL (ref 32.2–35.5)
MCV RBC AUTO: 84.6 FL (ref 81–99)
ORGANISM: ABNORMAL
PHOSPHORUS: 3.7 MG/DL (ref 2.4–4.7)
PLATELET # BLD: 253 THOU/MM3 (ref 130–400)
PMV BLD AUTO: 9.4 FL (ref 9.4–12.4)
POTASSIUM SERPL-SCNC: 3.6 MEQ/L (ref 3.5–5.2)
RBC # BLD: 4.08 MILL/MM3 (ref 4.2–5.4)
SODIUM BLD-SCNC: 136 MEQ/L (ref 135–145)
WBC # BLD: 23.5 THOU/MM3 (ref 4.8–10.8)

## 2021-07-12 PROCEDURE — 99233 SBSQ HOSP IP/OBS HIGH 50: CPT | Performed by: PHYSICIAN ASSISTANT

## 2021-07-12 PROCEDURE — 84100 ASSAY OF PHOSPHORUS: CPT

## 2021-07-12 PROCEDURE — 85027 COMPLETE CBC AUTOMATED: CPT

## 2021-07-12 PROCEDURE — 36415 COLL VENOUS BLD VENIPUNCTURE: CPT

## 2021-07-12 PROCEDURE — 6360000002 HC RX W HCPCS: Performed by: INTERNAL MEDICINE

## 2021-07-12 PROCEDURE — 6360000004 HC RX CONTRAST MEDICATION: Performed by: SURGERY

## 2021-07-12 PROCEDURE — 2580000003 HC RX 258: Performed by: PHARMACIST

## 2021-07-12 PROCEDURE — 80048 BASIC METABOLIC PNL TOTAL CA: CPT

## 2021-07-12 PROCEDURE — 2580000003 HC RX 258: Performed by: NURSE PRACTITIONER

## 2021-07-12 PROCEDURE — 1200000000 HC SEMI PRIVATE

## 2021-07-12 PROCEDURE — 2500000003 HC RX 250 WO HCPCS: Performed by: PHYSICIAN ASSISTANT

## 2021-07-12 PROCEDURE — 74177 CT ABD & PELVIS W/CONTRAST: CPT

## 2021-07-12 PROCEDURE — 6360000002 HC RX W HCPCS: Performed by: PHYSICIAN ASSISTANT

## 2021-07-12 PROCEDURE — 2500000003 HC RX 250 WO HCPCS: Performed by: NURSE PRACTITIONER

## 2021-07-12 PROCEDURE — 82948 REAGENT STRIP/BLOOD GLUCOSE: CPT

## 2021-07-12 PROCEDURE — 99232 SBSQ HOSP IP/OBS MODERATE 35: CPT | Performed by: SURGERY

## 2021-07-12 PROCEDURE — 2580000003 HC RX 258: Performed by: PHYSICIAN ASSISTANT

## 2021-07-12 PROCEDURE — 6360000002 HC RX W HCPCS: Performed by: OPHTHALMOLOGY

## 2021-07-12 PROCEDURE — APPSS45 APP SPLIT SHARED TIME 31-45 MINUTES: Performed by: NURSE PRACTITIONER

## 2021-07-12 RX ORDER — LEVOFLOXACIN 5 MG/ML
750 INJECTION, SOLUTION INTRAVENOUS EVERY 24 HOURS
Status: DISCONTINUED | OUTPATIENT
Start: 2021-07-12 | End: 2021-07-19 | Stop reason: HOSPADM

## 2021-07-12 RX ORDER — SODIUM CHLORIDE 9 MG/ML
INJECTION, SOLUTION INTRAVENOUS CONTINUOUS
Status: ACTIVE | OUTPATIENT
Start: 2021-07-12 | End: 2021-07-13

## 2021-07-12 RX ORDER — FUROSEMIDE 10 MG/ML
20 INJECTION INTRAMUSCULAR; INTRAVENOUS ONCE
Status: COMPLETED | OUTPATIENT
Start: 2021-07-12 | End: 2021-07-12

## 2021-07-12 RX ADMIN — MORPHINE SULFATE 4 MG: 4 INJECTION, SOLUTION INTRAMUSCULAR; INTRAVENOUS at 08:05

## 2021-07-12 RX ADMIN — LEVOFLOXACIN 750 MG: 5 INJECTION, SOLUTION INTRAVENOUS at 22:16

## 2021-07-12 RX ADMIN — METRONIDAZOLE 500 MG: 500 INJECTION, SOLUTION INTRAVENOUS at 20:37

## 2021-07-12 RX ADMIN — MORPHINE SULFATE 2 MG: 2 INJECTION, SOLUTION INTRAMUSCULAR; INTRAVENOUS at 15:30

## 2021-07-12 RX ADMIN — IOPAMIDOL 80 ML: 755 INJECTION, SOLUTION INTRAVENOUS at 04:56

## 2021-07-12 RX ADMIN — SODIUM CHLORIDE: 9 INJECTION, SOLUTION INTRAVENOUS at 08:01

## 2021-07-12 RX ADMIN — LEUCINE, PHENYLALANINE, LYSINE, METHIONINE, ISOLEUCINE, VALINE, HISTIDINE, THREONINE, TRYPTOPHAN, ALANINE, GLYCINE, ARGININE, PROLINE, SERINE, TYROSINE, DEXTROSE: 311; 238; 247; 170; 255; 247; 204; 179; 77; 880; 438; 489; 289; 213; 17; 5 INJECTION INTRAVENOUS at 22:07

## 2021-07-12 RX ADMIN — SODIUM CHLORIDE: 9 INJECTION, SOLUTION INTRAVENOUS at 20:38

## 2021-07-12 RX ADMIN — MORPHINE SULFATE 2 MG: 2 INJECTION, SOLUTION INTRAMUSCULAR; INTRAVENOUS at 20:37

## 2021-07-12 RX ADMIN — MORPHINE SULFATE 2 MG: 2 INJECTION, SOLUTION INTRAMUSCULAR; INTRAVENOUS at 04:05

## 2021-07-12 RX ADMIN — METRONIDAZOLE 500 MG: 500 INJECTION, SOLUTION INTRAVENOUS at 03:59

## 2021-07-12 RX ADMIN — FAMOTIDINE 20 MG: 10 INJECTION INTRAVENOUS at 20:37

## 2021-07-12 RX ADMIN — FAMOTIDINE 20 MG: 10 INJECTION INTRAVENOUS at 08:05

## 2021-07-12 RX ADMIN — CIPROFLOXACIN 400 MG: 2 INJECTION, SOLUTION INTRAVENOUS at 10:15

## 2021-07-12 RX ADMIN — SODIUM CHLORIDE: 9 INJECTION, SOLUTION INTRAVENOUS at 20:37

## 2021-07-12 RX ADMIN — FUROSEMIDE 20 MG: 40 INJECTION, SOLUTION INTRAMUSCULAR; INTRAVENOUS at 12:33

## 2021-07-12 RX ADMIN — METHYLPREDNISOLONE SODIUM SUCCINATE 30 MG: 40 INJECTION, POWDER, FOR SOLUTION INTRAMUSCULAR; INTRAVENOUS at 08:09

## 2021-07-12 RX ADMIN — MORPHINE SULFATE 4 MG: 4 INJECTION, SOLUTION INTRAMUSCULAR; INTRAVENOUS at 17:51

## 2021-07-12 RX ADMIN — METRONIDAZOLE 500 MG: 500 INJECTION, SOLUTION INTRAVENOUS at 12:30

## 2021-07-12 ASSESSMENT — PAIN DESCRIPTION - ORIENTATION
ORIENTATION: RIGHT;LEFT
ORIENTATION: RIGHT;LEFT

## 2021-07-12 ASSESSMENT — PAIN SCALES - GENERAL
PAINLEVEL_OUTOF10: 7
PAINLEVEL_OUTOF10: 5
PAINLEVEL_OUTOF10: 7
PAINLEVEL_OUTOF10: 2
PAINLEVEL_OUTOF10: 5
PAINLEVEL_OUTOF10: 5
PAINLEVEL_OUTOF10: 4

## 2021-07-12 ASSESSMENT — PAIN DESCRIPTION - FREQUENCY
FREQUENCY: CONTINUOUS
FREQUENCY: CONTINUOUS

## 2021-07-12 ASSESSMENT — PAIN DESCRIPTION - LOCATION
LOCATION: ABDOMEN
LOCATION: ABDOMEN

## 2021-07-12 ASSESSMENT — PAIN DESCRIPTION - PAIN TYPE
TYPE: ACUTE PAIN
TYPE: ACUTE PAIN;SURGICAL PAIN

## 2021-07-12 ASSESSMENT — PAIN - FUNCTIONAL ASSESSMENT: PAIN_FUNCTIONAL_ASSESSMENT: ACTIVITIES ARE NOT PREVENTED

## 2021-07-12 ASSESSMENT — PAIN DESCRIPTION - ONSET
ONSET: ON-GOING
ONSET: ON-GOING

## 2021-07-12 ASSESSMENT — PAIN DESCRIPTION - PROGRESSION: CLINICAL_PROGRESSION: NOT CHANGED

## 2021-07-12 ASSESSMENT — PAIN DESCRIPTION - DESCRIPTORS
DESCRIPTORS: BURNING
DESCRIPTORS: ACHING

## 2021-07-12 NOTE — PROGRESS NOTES
Hospitalist Progress Note      Patient:  Thomas Hamilton    Unit/Bed:5K-11/011-A  YOB: 1990  MRN: [id number]   Acct: [de-identified]   PCP: Alexander Ny MD  Date of Admission: 7/9/2021    Assessment/Plan:    1. Crohn's disease with current flare: pt has not had Humira since February due to loss of insurance. This is on hold while admitted. Given exacerbation pt is on steroid therapy, has been since 04/2021. Am cortisol wnl, no need for stress dosing. Gen Surg would prefer to taper off steroids sooner rather than later secondary to delayed healing of intraabdominal abscess. Will do this slowly, down to 30 mg daily Solu-medrol. 2. Intraabdominal abscess: Gen Surg on board and following. S/p drain placement x2 in INR. ID consulted. Continue with Cipro / Flagyl for now. Appreciate assistance. Afebrile, leukocytosis trending down. 3. Bilateral pleural effusions: noted on CT A/P, give one time dose of Lasix 20 mg IV and assess, currently respiratory status is stable, monitor   4. Chronic normocytic anemia: Hgb stable, no gross bleeding. Transfuse for Hgb < 7 or hemodynamic instability  5. Elevated LFTs, resolved   6. Severe protein calorie malnutrition: dietitian consult when able to eat recommended    Chief Complaint: abd pain     Initial H and P:-    \"History obtained from chart review and the patient.     The patient is a 31 y.o. female who presented to [address] [place] [address] with abd pain. Patient was just discharged from our service last week. She had acute Crohn's exacerbation. She was discharged on 7/4/21 on 20 mg BID  PO Prednison. Patient was supposed to start on Humira yesterday. Patient reported worsening abdominal pain on 7/9/21 early AM. She presented to ED where CT showed large perihepatic and smaller pelvic fluid collections.  S/P 2 drains placement by IR\"    Subjective (past 24 hours):   Pt doing okay, sitting up in bed with  at bedside. Reports tenderness diffusely in abd and at drain sites. Pt reports passing flatus, no BM for ~3 days. Denies fever/chills. No CP, admits to feeling SOB secondary to increased abd distention. Past medical history, family history, social history and allergies reviewed again and is unchanged since admission. ROS (12 point review of systems completed. Pertinent positives noted. Otherwise ROS is negative)     Medications:  Reviewed    Infusion Medications    sodium chloride      sodium chloride 100 mL/hr at 07/12/21 0801     Scheduled Medications    methylPREDNISolone  30 mg Intravenous Daily    enoxaparin  40 mg Subcutaneous Daily    sodium chloride flush  5-40 mL Intravenous 2 times per day    metroNIDAZOLE  500 mg Intravenous Q8H    ciprofloxacin  400 mg Intravenous Q12H    famotidine (PEPCID) injection  20 mg Intravenous BID     PRN Meds: sodium chloride flush, sodium chloride, ondansetron **OR** ondansetron, morphine **OR** morphine      Intake/Output Summary (Last 24 hours) at 7/12/2021 1026  Last data filed at 7/12/2021 0402  Gross per 24 hour   Intake 2443 ml   Output 230 ml   Net 2213 ml       Diet:  Diet NPO Exceptions are: Ice Chips, Sips of Water with Meds, Sips of Clear Liquids    Exam:  /70   Pulse 70   Temp 97.6 °F (36.4 °C) (Oral)   Resp 14   Ht 4' 11\" (1.499 m)   Wt 90 lb (40.8 kg)   LMP 07/09/2021   SpO2 97%   BMI 18.18 kg/m²   General appearance: ill-appearing, no apparent distress, appears stated age and cooperative. HEENT: Pupils equal, round, and reactive to light. Conjunctivae/corneas clear. Neck: Supple, with full range of motion. No jugular venous distention. Trachea midline. Respiratory:  Normal respiratory effort. Clear to auscultation, bilaterally without Rales/Wheezes/Rhonchi. Cardiovascular: Regular rate and rhythm with normal S1/S2 without murmurs, rubs or gallops.   Abdomen: Soft, drain on RLQ and LLQ with output noted, diffusey-tender, distended with hypoactive bowel sounds. Musculoskeletal: passive and active ROM x 4 extremities. Skin: Skin color, texture, turgor normal.  No rashes or lesions. Neurologic:  Neurovascularly intact without any focal sensory/motor deficits. Cranial nerves: II-XII intact, grossly non-focal.  Psychiatric: Alert and oriented, thought content appropriate, normal insight  Capillary Refill: Brisk,< 3 seconds   Peripheral Pulses: +2 palpable, equal bilaterally     Labs:   Recent Labs     07/10/21  0627 07/11/21  0658 07/12/21  0604   WBC 33.1* 25.7* 23.5*   HGB 12.0 10.5* 10.4*   HCT 38.7 34.5* 34.5*    240 253     Recent Labs     07/10/21  0627 07/11/21  0658 07/12/21  0604    136 136   K 4.0 4.0 3.6    101 101   CO2 27 26 24   BUN 7 12 14   CREATININE 0.5 0.3* 0.5   CALCIUM 8.4* 8.4* 8.2*     Recent Labs     07/10/21  0627   AST 12   ALT 35   BILITOT 1.2   ALKPHOS 102     No results for input(s): INR in the last 72 hours. No results for input(s): Murray Favre in the last 72 hours. Microbiology:    Blood culture #1: No results found for: [facility]    Blood culture #2:No results found for: Evert Matias    Organism:No results found for: [facility]      Lab Results   Component Value Date    LABGRAM  07/09/2021     Moderate segmented neutrophils observed. No epithelial cells observed. No organisms observed. LABGRAM  07/09/2021     Many segmented neutrophils observed. Many gram positive cocci in pairs and chains. Many gram negative bacilli.         MRSA culture only:No results found for: [facility]    Urine culture: No results found for: LABURIN    Respiratory culture: No results found for: CULTRESP    Aerobic and Anaerobic :  Lab Results   Component Value Date    LABAERO No growth-preliminary  07/09/2021     No results found for: LABANAE    Urinalysis:      Lab Results   Component Value Date    NITRU NEGATIVE 07/09/2021    WBCUA 5-9 07/09/2021    BACTERIA FEW 07/09/2021    RBCUA 3-5 07/09/2021    BLOODU LARGE sides of the abdomen. **This report has been created using voice recognition software. It may contain minor errors which are inherent in voice recognition technology. **         Final report electronically signed by Dr Ivet Calvert on 7/9/2021 3:38 PM      CT ABDOMEN PELVIS W IV CONTRAST Additional Contrast? None   Final Result   1. Interval development of large perihepatic collection with trapped air bubbles extending from dome of the liver through to the lower abdomen/upper pelvis, measures approximately 4.5 cm in its largest AP dimension. A second collection is seen in the    left lower quadrant, measures approximately 4.7 cm in largest AP dimension, approximately 13.5 cm in craniocaudad dimension and connects across the lower abdomen upper pelvis to the perihepatic collection, consistent with abscesses. Smaller collections    are seen in the pelvis centered at the periphery in a U shaped configuration, best demonstrated in the coronal plane and does not appear to communicate with the larger, aforementioned collection. 2.  Mesenteric induration and free fluid. 3.  .Moderate bilateral pleural effusions with adjacent atelectasis. **This report has been created using voice recognition software. It may contain minor errors which are inherent in voice recognition technology. **      Final report electronically signed by Dr. Nadja Richmond on 7/9/2021 10:53 AM      XR ACUTE ABD SERIES CHEST 1 VW   Final Result   1. Small right effusion with right base consolidation. .   2. Unremarkable supine and upright views of the abdomen. 3. Hepatomegaly. **This report has been created using voice recognition software. It may contain minor errors which are inherent in voice recognition technology. **      Final report electronically signed by Dr. Nadja Richmond on 7/9/2021 9:26 AM      CT ABSCESS DRAINAGE W CATH PLACEMENT S&I    (Results Pending)     XR ACUTE ABD SERIES CHEST 1 VW    Result Date: 7/9/2021  PROCEDURE: XR ACUTE ABD SERIES CHEST 1 VW CLINICAL INFORMATION: severe pain COMPARISON: No prior study. TECHNIQUE: PA chest, AP supine abdomen and AP erect abdomen performed. FINDINGS: CHEST: POSTOPERATIVE CHANGES: None. LINES/TUBES/MECHANICAL DEVICES: None. TRACHEA/CARDIOMEDIASTINAL SILHOUETTE: Normal. LUNG FIELDS: Small right effusion at right base opacity. Interstitial and peribronchial thickening in the lower lobes. PNEUMOTHORAX: None. OSSEOUS STRUCTURES: 1.  No acute osseous abnormality. OTHER: None. ABDOMEN: POSTOPERATIVE CHANGES: None. LINES/TUBES/MECHANICAL DEVICES: None. BOWEL GAS PATTERN: 1. Except for air in the stomach, the bowel is gasless. FREE AIR: None. MASS SHADOWS: The liver appears large. PATHOLOGIC CALCIFICATIONS: None. OSSEOUS STRUCTURES: 1. Levocurvature of the lumbar spine. OTHER: None. 1. Small right effusion with right base consolidation. . 2. Unremarkable supine and upright views of the abdomen. 3. Hepatomegaly. **This report has been created using voice recognition software. It may contain minor errors which are inherent in voice recognition technology. ** Final report electronically signed by Dr. Heidi Brooke on 7/9/2021 9:26 AM    CT GUIDED NEEDLE PLACEMENT    Result Date: 7/9/2021  CT-GUIDED ABSCESS DRAINAGE PERFORMED BY: Jason Puckett. Rodri Resendez M.D. Herb Thao: Fluid collections throughout the abdomen. One is within the right side and one is within the left side of the abdomen. APPROACH: Anterior right side of the abdomen. Anterior left side of the abdomen. CATHETER: 214 Yi multipurpose drainage catheters. FLUID OBTAINED: Yellow-colored fluid on the right and reddish colored fluid on the left was aspirated from the abdomen. Specimens were sent to the laboratory for further analysis.  SEDATION: Versed 1.5 mg and fentanyl 75 mcg , IV; the patient was sedated for 29 minutes during this procedure and monitored with EKG and pulse ox monitoring devices by a registered nurse. Face-to-face time with the patient was 29 minutes. PROCEDURE: Signed informed consent was obtained prior to performing this procedure. The patient was placed on the CT scanner and sedated, as indicated above. ALL CT SCANS AT THIS FACILITY use dose modulation, iterative reconstruction, and/or weight-based dosing when appropriate to reduce radiation dose to as low as reasonably achievable. CT images were initially obtained to determine appropriate puncture sites. The skin was marked, prepped, and draped in a sterile fashion. Following local anesthesia and utilizing aseptic technique, needles were successfully passed into the fluid collections. A small amount of fluid was aspirated to confirm appropriate needle position. A guidewire was then passed through the needle followed by insertion of progressively larger dilators up to an port Western Melanie size. An 15 Indonesian multi-side-hole drainage catheter was then passed over the wire and was coiled within the abscess pocket. The retaining suture was then locked in the standard fashion. Catheter was then hooked to a Prabhu-Close drainage bag and the catheter was flushed several times with sterile saline. The catheter was stabilized to the skin with a Percu-Stay device. A sample of the fluid was sent to laboratory for culture and sensitivity testing. Successful, uncomplicated placement of 14 Indonesian drainage catheters into both sides of the abdomen. **This report has been created using voice recognition software. It may contain minor errors which are inherent in voice recognition technology. ** Final report electronically signed by Dr Vinita Mckeon on 7/9/2021 3:38 PM    CT GUIDED NEEDLE PLACEMENT    Result Date: 7/9/2021  CT-GUIDED ABSCESS DRAINAGE PERFORMED BY: Augusta Whitlock. STERLING Bolden Lo: Fluid collections throughout the abdomen. One is within the right side and one is within the left side of the abdomen.  APPROACH: Anterior right side of the abdomen. Anterior left side of the abdomen. CATHETER: 214 Macedonian multipurpose drainage catheters. FLUID OBTAINED: Yellow-colored fluid on the right and reddish colored fluid on the left was aspirated from the abdomen. Specimens were sent to the laboratory for further analysis. SEDATION: Versed 1.5 mg and fentanyl 75 mcg , IV; the patient was sedated for 29 minutes during this procedure and monitored with EKG and pulse ox monitoring devices by a registered nurse. Face-to-face time with the patient was 29 minutes. PROCEDURE: Signed informed consent was obtained prior to performing this procedure. The patient was placed on the CT scanner and sedated, as indicated above. ALL CT SCANS AT THIS FACILITY use dose modulation, iterative reconstruction, and/or weight-based dosing when appropriate to reduce radiation dose to as low as reasonably achievable. CT images were initially obtained to determine appropriate puncture sites. The skin was marked, prepped, and draped in a sterile fashion. Following local anesthesia and utilizing aseptic technique, needles were successfully passed into the fluid collections. A small amount of fluid was aspirated to confirm appropriate needle position. A guidewire was then passed through the needle followed by insertion of progressively larger dilators up to an port Western Melanie size. An 15 Macedonian multi-side-hole drainage catheter was then passed over the wire and was coiled within the abscess pocket. The retaining suture was then locked in the standard fashion. Catheter was then hooked to a Prabhu-Close drainage bag and the catheter was flushed several times with sterile saline. The catheter was stabilized to the skin with a Percu-Stay device. A sample of the fluid was sent to laboratory for culture and sensitivity testing. Successful, uncomplicated placement of 14 Macedonian drainage catheters into both sides of the abdomen.  **This report has been created using voice recognition software. It may contain minor errors which are inherent in voice recognition technology. ** Final report electronically signed by Dr Kentrell Zafar on 7/9/2021 3:38 PM    CT ABDOMEN PELVIS W IV CONTRAST Additional Contrast? None    Result Date: 7/9/2021  PROCEDURE: CT ABDOMEN PELVIS W IV CONTRAST CLINICAL INFORMATION: diffuse abdominal pain COMPARISON: CT abdomen and pelvis June 29, 2021 TECHNIQUE: 5 mm axial imaging through the abdomen and pelvis with IV contrast.  Coronal and sagittal reconstructions were performed. All CT scans at this facility use dose modulation, iterative reconstruction, and/or weight based dosing when appropriate to reduce the radiation dose to as low as reasonably achievable. CONTRAST: type and amount FINDINGS: LUNG BASES: Moderate bilateral pleural effusions are seen with adjacent atelectasis. LIVER/GALLBLADDER/BILIARY TREE: The liver measures 19.7 cm in craniocaudad dimension. There is redemonstration of a subcentimeter low-density focus in the lateral aspect of the posterior segment of the right lobe of the liver. The gallbladder is unremarkable except for prior cholecystectomy with elevated gallbladder pathology. PANCREAS/SPLEEN: Unremarkable. ADRENAL GLANDS: Unremarkable. KIDNEYS: Wedge-shaped bands of hypoenhancement are seen in both kidney, not present previously, appearance consistent with pyelonephritis. The kidneys overall do not appear particularly swollen at this time. . BOWEL: 1. Nonobstructive. There is diffuse thickening of the ileal loops, extending significantly more than previous, but the degree of thickness is not as severe as previous, particularly the terminal ileum. The colon is collapsed. The walls of the ascending colon and transverse colon appear inflamed and mildly thickened. Thickening of the walls of duodenal is also seen, with enhancement, also changed from previous.  FREE AIR/FREE FLUID/INFLAMMATION: Large perihepatic collection with trapped air bubbles seen extending from dome of the liver through to the pelvis, measures approximately 4.5 cm in its largest AP dimension. A second collection is seen in the left lower quadrant, measures approximately 4.7 cm in largest AP dimension, approximately 13.5 cm in craniocaudad dimension and connects across the lower abdomen upper pelvis to the perihepatic collection (coronal images 22-27). Smaller collections are seen in the pelvis centered at the periphery in a U shaped configuration, best demonstrated in the coronal plane and does not appear communicate with the larger, aforementioned collection. There is fluid throughout the mesentery. LYMPHADENOPATHY: 1. No pathologically enlarged lymph nodes. ABDOMINAL AORTA: Unremarkable. PELVIS: 1. U-shaped abscess centered around the periphery. Mesenteric inflammation. . ABDOMINAL WALL: Diffuse abdominal wall edema, with fluid density in the fascial planes, extending through to the imaged thigh. MUSCULOSKELETAL: Unremarkable lumbar spine and joints of the pelvis. No sign of infection. Levo curve of the lumbar spine. OTHER: None. 1.  Interval development of large perihepatic collection with trapped air bubbles extending from dome of the liver through to the lower abdomen/upper pelvis, measures approximately 4.5 cm in its largest AP dimension. A second collection is seen in the left lower quadrant, measures approximately 4.7 cm in largest AP dimension, approximately 13.5 cm in craniocaudad dimension and connects across the lower abdomen upper pelvis to the perihepatic collection, consistent with abscesses. Smaller collections are seen in the pelvis centered at the periphery in a U shaped configuration, best demonstrated in the coronal plane and does not appear to communicate with the larger, aforementioned collection. 2.  Mesenteric induration and free fluid. 3.  .Moderate bilateral pleural effusions with adjacent atelectasis.  **This report has been created using voice recognition software. It may contain minor errors which are inherent in voice recognition technology. ** Final report electronically signed by Dr. Zakiya Harper on 7/9/2021 10:53 AM    CT ABSCESS DRAINAGE SOFT TISSUE    Result Date: 7/9/2021  CT-GUIDED ABSCESS DRAINAGE PERFORMED BY: Marilee Pires. Mckayla Cornelius M.D. [facility]: Fluid collections throughout the abdomen. One is within the right side and one is within the left side of the abdomen. APPROACH: Anterior right side of the abdomen. Anterior left side of the abdomen. CATHETER: 214 [place]istan[place] multipurpose drainage catheters. FLUID OBTAINED: Yellow-colored fluid on the right and reddish colored fluid on the left was aspirated from the abdomen. Specimens were sent to the laboratory for further analysis. SEDATION: Versed 1.5 mg and fentanyl 75 mcg , IV; the patient was sedated for 29 minutes during this procedure and monitored with EKG and pulse ox monitoring devices by a registered nurse. Face-to-face time with the patient was 29 minutes. PROCEDURE: Signed informed consent was obtained prior to performing this procedure. The patient was placed on the CT scanner and sedated, as indicated above. ALL CT SCANS AT THIS FACILITY use dose modulation, iterative reconstruction, and/or weight-based dosing when appropriate to reduce radiation dose to as low as reasonably achievable. CT images were initially obtained to determine appropriate puncture sites. The skin was marked, prepped, and draped in a sterile fashion. Following local anesthesia and utilizing aseptic technique, needles were successfully passed into the fluid collections. A small amount of fluid was aspirated to confirm appropriate needle position. A guidewire was then passed through the needle followed by insertion of progressively larger dilators up to an port Western Melanie size. An 15 Tristanian multi-side-hole drainage catheter was then passed over the wire and was coiled within the abscess pocket.  The retaining suture was then locked in the standard fashion. Catheter was then hooked to a Prabhu-Close drainage bag and the catheter was flushed several times with sterile saline. The catheter was stabilized to the skin with a Percu-Stay device. A sample of the fluid was sent to laboratory for culture and sensitivity testing. Successful, uncomplicated placement of 14 Citizen of [place] drainage catheters into both sides of the abdomen. **This report has been created using voice recognition software. It may contain minor errors which are inherent in voice recognition technology. ** Final report electronically signed by Dr Jaquelin Gay on 7/9/2021 4:18 PM    CT ABSCESS DRAINAGE SOFT TISSUE    Result Date: 7/9/2021  CT-GUIDED ABSCESS DRAINAGE PERFORMED BY: Elsa Thibodeaux. STERLING Zhang: Fluid collections throughout the abdomen. One is within the right side and one is within the left side of the abdomen. APPROACH: Anterior right side of the abdomen. Anterior left side of the abdomen. CATHETER: 214 Citizen [place] multipurpose drainage catheters. FLUID OBTAINED: Yellow-colored fluid on the right and reddish colored fluid on the left was aspirated from the abdomen. Specimens were sent to the laboratory for further analysis. SEDATION: Versed 1.5 mg and fentanyl 75 mcg , IV; the patient was sedated for 29 minutes during this procedure and monitored with EKG and pulse ox monitoring devices by a registered nurse. Face-to-face time with the patient was 29 minutes. PROCEDURE: Signed informed consent was obtained prior to performing this procedure. The patient was placed on the CT scanner and sedated, as indicated above. ALL CT SCANS AT THIS FACILITY use dose modulation, iterative reconstruction, and/or weight-based dosing when appropriate to reduce radiation dose to as low as reasonably achievable. CT images were initially obtained to determine appropriate puncture sites. The skin was marked, prepped, and draped in a sterile fashion.  Following local anesthesia

## 2021-07-12 NOTE — PROGRESS NOTES
Gastroenterology Progress Note:     Patient Name:  Franck Carr   MRN: [id number]  [id number]  YOB: 1990  Admit Date: 7/9/2021  8:17 AM  Primary Care Physician: Carina Vasquez MD   5K-11/011-A     Patient seen and examined. 24 hours events and chart reviewed. Subjective: Patient resting in bed, family present. She continues to have generalized abdominal discomfort. Denies nausea & vomiting. No bowel movement overnight or yet today. Objective:  /70   Pulse 70   Temp 97.6 °F (36.4 °C) (Oral)   Resp 14   Ht 4' 11\" (1.499 m)   Wt 90 lb (40.8 kg)   LMP 07/09/2021   SpO2 97%   BMI 18.18 kg/m²     Physical Exam:    General:  Nourished in no distress  HEENT: Atraumatic, normocephalic. Moist oral mucous membranes. Neck: Supple without adenopathy, JVD, thyromegaly or masses. Trachea midline. CV: Heart RRR, no murmurs, rubs, gallops. Resp: Even, easy without cough or accessory use. Lungs clear to ascultation bilaterally. Abd: Round, semi-firm, tender throughout with palpation. No hepatosplenomegaly or mass present. Hypoactive bowel sounds heard. Distention noted. Drains x 2 in place. Ext:  Without cyanosis, clubbing, edema. Skin: Pink, warm, dry  Neuro:  Alert, oriented x 3 with no obvious deficits.        Rectal: deferred    Labs:   CBC:   Lab Results   Component Value Date    WBC 23.5 07/12/2021    HGB 10.4 07/12/2021    HCT 34.5 07/12/2021    MCV 84.6 07/12/2021     07/12/2021     BMP:   Lab Results   Component Value Date     07/12/2021    K 3.6 07/12/2021    K 4.1 07/09/2021     07/12/2021    CO2 24 07/12/2021    BUN 14 07/12/2021    CREATININE 0.5 07/12/2021    CALCIUM 8.2 07/12/2021     Lipids:   Lab Results   Component Value Date    ALKPHOS 102 07/10/2021    ALT 35 07/10/2021    AST 12 07/10/2021    BILITOT 1.2 07/10/2021    LABALBU 1.9 07/10/2021    LIPASE 23.2 06/29/2021     Significant Diagnostic Studies:   CT abdomen/pelvis 07/12/21  Impression Impression:   1.  Slight decreased size of the intra-abdominal fluid collections.  The    catheters are in place as detailed above. 2.  Other incidental findings as above. Current Meds:  Scheduled Meds:   methylPREDNISolone  30 mg Intravenous Daily    enoxaparin  40 mg Subcutaneous Daily    sodium chloride flush  5-40 mL Intravenous 2 times per day    metroNIDAZOLE  500 mg Intravenous Q8H    ciprofloxacin  400 mg Intravenous Q12H    famotidine (PEPCID) injection  20 mg Intravenous BID     Continuous Infusions:   sodium chloride      sodium chloride 100 mL/hr at 07/12/21 0801       Assessment:  32 yo F admitted 07/09/21 for abd pain. CT A/P demonstrated large intra-abdominal abscesses, surgery following, IR placed percutaneous drain x 2 07/09/21. Recent hospitalization for abd pain, Crohn's exacerbation secondary to loss of Humira due to loss of insurance, discharged on steroids. IM following for steroid tapering, concern for adrenal insufficiency. IV Solumedrol restarted 7/10/21. AM Cortisol 13.08. Repeat CT A/P demonstrated slight decreased size of the intra-abdominal fluid collections. 1. Large intra-abdominal abscesses s/p percutaneous drain x 2- surgery following  2. Leukocytosis- on steroids  3. Crohn's exacerbation secondary to loss of Humira due to loss of insurance- Humira approved by insurance & delivered to patient last week  4. Elevated LFTs  5. Pleural effusions  6. Hypoalbuminemia  7.  Long-term steroid use    Plan:     Monitor H & H, transfuse   ATBs, ID consulted by surgery   Steroid taper per IM for risk of adrenal insufficiency/withdrawal   Humira currently on hold, keep in mind patient has not received a dose of Humira since April, has moderate to severe active colitis secondary to Crohn's not treated since April   Diet per surgery   Pain & nausea control per surgery   Case discussed at length with SUNG RUIZ & general surgery Bibiana Blakely APRN   Supportive care per primary team  Will follow    Case reviewed and impression/plan reviewed in collaboration with Dr. Noelle Caban  Electronically signed by PRAVEENA Jiménez CNP on 7/12/2021 at 10:47 AM     Associates

## 2021-07-12 NOTE — PROGRESS NOTES
Pharmacy Consult       TPN    Ordering Provider: Maggie Resendez CNP    Indication for TPN: NPO, large intra-abdominal abscess    Macronutrients   DW: 40.8kg   Total Kcal: 10 Kcal/kg   Infusion Rate: 50 mL/hr    Electrolyte Replacement: None    Assessment/ Plan:  Start PPN this evening, using Clinimix 4.25/5 at a rate of 50ml/hr. When PPN starts, decrease 0.9% Sodium Chloride to 50ml/hr. Start medium dose sliding scale q6h. Draw BMP, mag, phos and ical in AM 7/13/21.

## 2021-07-12 NOTE — PROGRESS NOTES
Consent for exploratory laparotomy with Dr. Lashae Leonard placed in chart. Patient verbalizes understanding and has no questions at this time.

## 2021-07-12 NOTE — PROGRESS NOTES
Patient taken down for CT with IV contrast of abdomen/pelvis via wheel chair. No issues. Patient tolerated.    Electronically signed by Rachael Tavarez RN on 7/12/2021 at 5:15 AM

## 2021-07-12 NOTE — CARE COORDINATION
7/12/21, 1:34 PM EDT  DISCHARGE PLANNING EVALUATION:    Vince Urbano       Admitted: 7/9/2021/ [address] day: 3   Location: -11/011-A Reason for admit: Intra-abdominal abscess ([facility] [address] [K65.1]   PMH:  has a past medical history of Crohn's colitis ([facility] [address] Procedure: 7/09/2021 bilateral abdominal drains placed in IR for abscess drainage. Barriers to Discharge:  Patient presents with abdominal pain. Consults to Hospitalist, GI and ID, IV fluids, IV Lasix x 1 dose, Lovenox, IV Pepcid, IV Cipro, IV Flagyl, Solu-Medrol, prn Morphine and Zofran, BMP and CBC in a.m., CT of abdomen, NPO, incentive spirometry, SCD's, up with assistance as tolerated. PCP: Natasha Cazares MD  Readmission Risk Score: 11%    Patient Goals/Plan/Treatment Preferences: Met with [facility]. She is from home with her . Kevin Sethi verifies her insurance and PCP. She can afford her medications and denies a need for DME. Her  will transport her home at discharge. Kevin Sethi is independent managing her health care needs. She may be going home with drains in place[facility]. Plan to follow-up with [facility] when drain plan is known. Transportation/Food Security/Housekeeping Addressed:  No issues identified.

## 2021-07-12 NOTE — CONSULTS
CONSULTATION NOTE :ID       Patient - James Arechiga,  Age - 31 y.o.    - 1990      Room Number - 5K-11/011-A   MRN -  [id number]   [facility]  Date of Admission -  2021  8:17 AM  Patient's PCP: Clarisa Sharpe MD     Requesting Physician: Leoncio Edward MD    REASON FOR CONSULTATION   With antibiotics  CHIEF COMPLAINT   Abdominal pain    HISTORY OF PRESENT ILLNESS       This is a very pleasant 31 y.o. female who was admitted to the hospital with a chief complaints of worsening of the pain. She has history of Crohn's disease was recently discharged from the hospital after she had a flareup of Crohn's disease. She was not on Humira due to loss of insurance and had flareup that required antibiotic IV steroid treatment unfortunately 5 days after her discharge she had worsening abdominal pain and was admitted. Her CT scan shows intra-abdominal abscess had drain placement however she continues to have abdominal pain. She had no previous surgery related to Crohn's disease she had  section in the past.  She has bloating and abdominal distention.   She has allergies to penicillin she has been on Cipro and Flagyl    PAST MEDICAL  HISTORY       Past Medical History:   Diagnosis Date    Crohn's colitis ([address]        PAST SURGICAL HISTORY     Past Surgical History:   Procedure Laterality Date    CT DRAIN SOFT TISSUE ABSCESS  2021    CT DRAIN SOFT TISSUE ABSCESS 2021 [facility] CT SCAN    CT DRAIN SOFT TISSUE ABSCESS  2021    CT DRAIN SOFT TISSUE ABSCESS 2021 [facility] CT SCAN    OR  DELIVERY ONLY N/A 10/12/2018     SECTION performed by Lai Zavala MD at [address]:       Scheduled Meds:   methylPREDNISolone  30 mg Intravenous Daily    enoxaparin  40 mg Subcutaneous Daily    sodium chloride flush  5-40 mL Intravenous 2 times per day    metroNIDAZOLE  500 mg Intravenous Q8H    ciprofloxacin  400 mg Intravenous Q12H    famotidine (PEPCID) injection  20 mg Intravenous BID     Continuous Infusions:   sodium chloride      sodium chloride 100 mL/hr at 07/12/21 0801     PRN Meds:sodium chloride flush, sodium chloride, ondansetron **OR** ondansetron, morphine **OR** morphine  Allergies:   ALLERGIES:    Duricef [cefadroxil] and Penicillins        SOCIAL HISTORY:     TOBACCO:   reports that she has never smoked. She has never used smokeless tobacco.     ETOH:   reports no history of alcohol use. Patient currently lives with family        FAMILY HISTORY:         Problem Relation Age of Onset    Other Mother        REVIEW OF SYSTEMS:     Constitutional: no fever, no night sweats, no fatigue, no weight loss. Head: no head ache , no head injury, no migranes. Eye: no eye discharge, blurring of vision, no double vision,no eye pain. Ears: no hearing difficulty, no tinnitus  Mouth/throat: no ulceration, dental caries , dysphagia, no hoarseness and voice change  Respiratory: no cough no chest pain,no shortness of breath,no wheezing  CVS: no palpitation, no chest pain,   GI: As noted in HPI.    DEEPIKA: no dysuria, frequency and urgency, no hematuria, no kidney stones  Musculoskeletal: no joint pain, swelling , stiffness,  Endocrine: no polyuria, polydipsia, no cold or heat intolerance  Hematology: no anemia, no easy brusing or bleeding, no hx of clotting disorder  Dermatology: no skin rash, no skin lesions, no pruritis,  Neurological:no headaches,no dizziness, no seizure, no numbness. Psychiatry: no depression, no anxiety,no panic attacks, no suicide ideation    PHYSICAL EXAM:     /65   Pulse 66   Temp 97.6 °F (36.4 °C) (Oral)   Resp 16   Ht 4' 11\" (1.499 m)   Wt 90 lb (40.8 kg)   LMP 07/09/2021   SpO2 96%   BMI 18.18 kg/m²   General apperance:  Awake, alert, not in distress. HEENT: pink conjunctiva, unicteric sclera, moist oral mucosa.   Chest: Bilateral air entry  Cardiovascular:  RRR ,S1S2, no murmur or gallop. Abdomen: Distended lower abdomen, she has tenderness on the lower abdomen, no rigidity   extremities: No edema. Skin:  Warm and dry. CNS: Oriented to person place and time. LABS:     CBC:   Recent Labs     07/10/21  0627 07/11/21  0658 07/12/21  0604   WBC 33.1* 25.7* 23.5*   HGB 12.0 10.5* 10.4*    240 253     BMP:    Recent Labs     07/10/21  0627 07/11/21  0658 07/12/21  0604    136 136   K 4.0 4.0 3.6    101 101   CO2 27 26 24   BUN 7 12 14   CREATININE 0.5 0.3* 0.5   GLUCOSE 97 119* 106     Calcium:  Recent Labs     21   CALCIUM 8.2*      Recent Labs     07/10/21  0627   ALKPHOS 102   ALT 35   AST 12   PROT 4.6*   BILITOT 1.2   LABALBU 1.9*         Problem list of patient      Patient Active Problem List   Diagnosis Code    Single delivery by  O82    Abdominal pain R10.9    Crohn's disease of large intestine without complication ([facility] [address] Y34.81    Intra-abdominal abscess ([facility] [address] K65.1           Impression and Recommendation:   Intra-abdominal abscess related to Crohn's disease flare: She continues to have lower abdominal pain despite drain. We will change Cipro to Levaquin for better gram-positive coverage. Patient may benefit from exploratory surgery with lavage drainage of the abscess. Thank you Marlo Street MD for allowing me to participate in this patient's care.     Cabrera Urbano MD, MD,FACP 2021 4:12 PM

## 2021-07-12 NOTE — CARE COORDINATION
07/12/21 1531   Readmission Assessment   Number of Days since last admission? 1-7 days   Previous Disposition Home with Family   Who is being Interviewed Patient   What was the patient's/caregiver's perception as to why they think they needed to return back to the hospital?   (abdominal pain-two abdominal abscesses found, drains placed in IR.)   Did you visit your Primary Care Physician after you left the hospital, before you returned this time? No   Why weren't you able to visit your PCP? Other (Comment)  (Patient states she did not call to set an appointment up after her discharge (7/04/2021). )   Did you see a specialist, such as Cardiac, Pulmonary, Orthopedic Physician, etc. after you left the hospital? No   Who advised the patient to return to the hospital? Self-referral   Does the patient report anything that got in the way of taking their medications? No   In our efforts to provide the best possible care to you and others like you, can you think of anything that we could have done to help you after you left the hospital the first time, so that you might not have needed to return so soon? Other (Comment)  Rolando Cuellar states she felt ready for discharge. She could not think of anything we could have improved on.  If Juventino Miner goes home with abdominal drains in place she agrees to [facility].)

## 2021-07-12 NOTE — PROGRESS NOTES
Maxine Vargas   Daily Progress Note    Pt Name: [address] Record Number: [id number]  Date of Birth 1990   Today's Date: 7/12/2021    Hospital day # 3     ASSESSMENT   1. Large intra-abdominal abscess status post percutaneous drain placement x2  2. Bilateral pleural effusions  3. Leukocytosis  4. Thrombocytosis  5. Elevated liver enzymes  6. Crohn's disease   has a past medical history of Crohn's colitis ([facility] [address] PLAN   1. Status post percutaneous drain placement x2. Neither drain putting out much anymore. 2. IV antibiotics. ID following along. Adjustments made. 3. Pain and nausea control  4. Holding prednisone and Humira. GI following. 5. NPO. Start PPN. 6. SCDs for DVT prophylaxis. Refusing Lovenox. 7. Medicine managing the weaning of steroids. Would like patient to be tapered off as soon as possible with surgery. 8. Leukocytosis better today. Continue to trend labs. 9. Informed consent  10. CT imaging discussed in details. Pros and cons of surgical intervention along with possible risks and complication discussed. Plan for exploratory laparotomy with washout  tomorrow with possible bowel resection and ileostomy.  at bedside. Questions all answered. She is agreeable to proceed. SUBJECTIVE   Chief complaint: Abdominal pain & bloating    Chart reviewed. Stable overnight. Patient feels about the same. Abdomen is still distended and tender. Neither drain putting out much. Left drain still more serous and left drain purulent green pus like. No urinary complaints. No nausea or vomiting. Denies chest pain or shortness of breath. No lightheadedness or dizziness. Passing some gas but no BMs.    CURRENT MEDICATIONS   Scheduled Meds:   levofloxacin  750 mg Intravenous Q24H    methylPREDNISolone  30 mg Intravenous Daily    enoxaparin  40 mg Subcutaneous Daily    sodium chloride flush  5-40 mL Intravenous 2 times per day    metroNIDAZOLE  500 mg Intravenous Q8H    famotidine (PEPCID) injection  20 mg Intravenous BID     Continuous Infusions:   sodium chloride      sodium chloride 100 mL/hr at 21 0801     PRN Meds:.sodium chloride flush, sodium chloride, ondansetron **OR** ondansetron, morphine **OR** morphine  OBJECTIVE   CURRENT VITALS:  height is 4' 11\" (1.499 m) and weight is 90 lb (40.8 kg). Her oral temperature is 97.6 °F (36.4 °C). Her blood pressure is 103/65 and her pulse is 66. Her respiration is 16 and oxygen saturation is 96%. Temperature Range (24h):Temp: 97.6 °F (36.4 °C) Temp  Av °F (36.7 °C)  Min: 97.6 °F (36.4 °C)  Max: 99.1 °F (37.3 °C)  BP Range (15U): Systolic (24YXK), KIT:154 , Min:102 , WJW:381     Diastolic (02ABL), XVA:04, Min:65, Max:77    Pulse Range (24h): Pulse  Av.5  Min: 66  Max: 75  Respiration Range (24h): Resp  Avg: 15.5  Min: 14  Max: 16  Current Pulse Ox (24h):  SpO2: 96 %  Pulse Ox Range (24h):  SpO2  Av.3 %  Min: 96 %  Max: 97 %  Oxygen Amount and Delivery:    Incentive Spirometry Tx:            GENERAL: alert, cooperative, no distress  SKIN: Skin color, texture, turgor normal. No rashes or lesions. HEENT: Head is normocephalic, atraumatic. NECK: Supple, symmetrical, trachea midline  LUNGS: clear to ausculation, without wheezes, rales or rhonci  HEART: Regular rate and regular rhythm  ABDOMEN: soft, generalized tenderness, distended, hypoactive bowel sounds present. NEUROLOGIC: There are no focalizing motor or sensory deficits. CN II-XII are grossly intact. EXTREMITIES: no cyanosis, no clubbing and no edema. In: Olga Nolan [P.O.:50; I.V.:1840]  Out: 48 [Drains:50]  Date 21 0000 - 21 2359   Shift 8884-4934 3953-8747 4862-2723 24 Hour Total   INTAKE   P.O.  50  50   I. V.(mL/kg/hr) 860(2.6) 980(3)  1840   Shift Total(mL/kg) 860(21.1) 3467(67.0)  4472(50.5)   OUTPUT   Drains  50  50   Shift Total(mL/kg)  50(1.2)  50(1.2)   Weight (kg) 40.8 40.8 40.8 40.8     LABS Recent Labs     07/10/21  0627 07/11/21  0658 07/12/21  0604   WBC 33.1* 25.7* 23.5*   HGB 12.0 10.5* 10.4*   HCT 38.7 34.5* 34.5*    240 253    136 136   K 4.0 4.0 3.6    101 101   CO2 27 26 24   BUN 7 12 14   CREATININE 0.5 0.3* 0.5   CALCIUM 8.4* 8.4* 8.2*      No results for input(s): PTT, INR in the last 72 hours. Invalid input(s): PT  Recent Labs     07/10/21  0627   AST 12   ALT 35   BILITOT 1.2     No results for input(s): TROPONINT in the last 72 hours. RADIOLOGY     CT abdomen/pelvis with intravenous contrast       Comparison:  CT,KOSR  - CT ABDOMEN PELVIS W IV CONTRAST  - 07/09/2021    10:14 AM EDT       Findings:   Bilateral pleural effusions and compressive atelectasis is noted.  The    pleural effusions are similar to slightly larger compared with the    previous study.  The heart is grossly unremarkable.       Right hepatic lobe of the liver is somewhat heterogeneous. [place] is a 1.1    cm low-attenuation lesion in the right hepatic lobe on image 29 which is    nonspecific.  The gallbladder and biliary system are within normal limits. The spleen is unremarkable. The pancreas is within normal limits. The adrenal glands are unremarkable. There is no hydronephrosis or nephrolithiasis.    There is a drainage catheter in the abdomen on the right.  The fluid and    gas collection on the right has decreased in size and now measures    approximately 10 cm x 1.8 cm compared with 13 cm x 5.5 cm on the previous    study.  There is a catheter in the fluid collection in the left hemipelvis    as well.  That collection previously measured 11.5 cm x 4.6 cm and now    measures 9.5 cm x 3.8 cm.  There is a separate fluid collection in the    posterior cul-de-sac which is slightly smaller compared to the prior    study.       The appendix is not definitively seen.  The pelvic structures are    unremarkable.       No acute fracture or dislocation.           Impression   Impression: 1.  Slight decreased size of the intra-abdominal fluid collections.  The    catheters are in place as detailed above. 2.  Other incidental findings as above.       This document has been electronically signed by: Sima Hall MD on    07/12/2021 07:12 AM       All CTs at this facility use dose modulation techniques and iterative    reconstructions, and/or weight-based dosing   when appropriate to reduce radiation to a low as reasonably achievable. Electronically signed by PRAVEENA Sinclair CNP on 7/12/2021 at 4:57 PM     Patient seen and examined independently by me July 12, 2021. Above discussed and I agree with Dimas Kumar CNP. See my additional comments below for updated orders and plan. Labs, cultures, and radiographs where available were reviewed. I discussed patient concerns with the patient's nurse and instructions were given. Please see our orders for the updated patient care plan. -CT scan reviewed with patient. Persistent abscess even though it is better. Drain is not draining much. Recommended washout by surgical means. Infectious disease following who agrees with plan. NPO. Planning washout tomorrow with larger drain placements. Consent. Risks, benefits and alternatives discussed. Patient agrees to proceed. Continue antibiotics. Start PPN.   Pain and nausea control  Electronically signed by Brandy Cm MD on 7/12/21 at 3:56 PM EDT

## 2021-07-13 ENCOUNTER — ANESTHESIA EVENT (OUTPATIENT)
Dept: OPERATING ROOM | Age: 31
DRG: 356 | End: 2021-07-13
Payer: COMMERCIAL

## 2021-07-13 ENCOUNTER — ANESTHESIA (OUTPATIENT)
Dept: OPERATING ROOM | Age: 31
DRG: 356 | End: 2021-07-13
Payer: COMMERCIAL

## 2021-07-13 VITALS — OXYGEN SATURATION: 100 % | TEMPERATURE: 97.7 F | DIASTOLIC BLOOD PRESSURE: 83 MMHG | SYSTOLIC BLOOD PRESSURE: 117 MMHG

## 2021-07-13 PROBLEM — E43 SEVERE MALNUTRITION (HCC): Status: ACTIVE | Noted: 2021-07-13

## 2021-07-13 LAB
ANION GAP SERPL CALCULATED.3IONS-SCNC: 7 MEQ/L (ref 8–16)
BUN BLDV-MCNC: 12 MG/DL (ref 7–22)
CALCIUM IONIZED: 1.15 MMOL/L (ref 1.12–1.32)
CALCIUM SERPL-MCNC: 8.3 MG/DL (ref 8.5–10.5)
CHLORIDE BLD-SCNC: 101 MEQ/L (ref 98–111)
CO2: 29 MEQ/L (ref 23–33)
CREAT SERPL-MCNC: 0.4 MG/DL (ref 0.4–1.2)
ERYTHROCYTE [DISTWIDTH] IN BLOOD BY AUTOMATED COUNT: 15.1 % (ref 11.5–14.5)
ERYTHROCYTE [DISTWIDTH] IN BLOOD BY AUTOMATED COUNT: 45.3 FL (ref 35–45)
GFR SERPL CREATININE-BSD FRML MDRD: > 90 ML/MIN/1.73M2
GLUCOSE BLD-MCNC: 106 MG/DL (ref 70–108)
GLUCOSE BLD-MCNC: 117 MG/DL (ref 70–108)
GLUCOSE BLD-MCNC: 132 MG/DL (ref 70–108)
GLUCOSE BLD-MCNC: 139 MG/DL (ref 70–108)
HCT VFR BLD CALC: 36.7 % (ref 37–47)
HEMOGLOBIN: 11 GM/DL (ref 12–16)
MAGNESIUM: 1.8 MG/DL (ref 1.6–2.4)
MCH RBC QN AUTO: 25 PG (ref 26–33)
MCHC RBC AUTO-ENTMCNC: 30 GM/DL (ref 32.2–35.5)
MCV RBC AUTO: 83.4 FL (ref 81–99)
PHOSPHORUS: 3 MG/DL (ref 2.4–4.7)
PLATELET # BLD: 256 THOU/MM3 (ref 130–400)
PMV BLD AUTO: 8.7 FL (ref 9.4–12.4)
POTASSIUM SERPL-SCNC: 3.8 MEQ/L (ref 3.5–5.2)
RBC # BLD: 4.4 MILL/MM3 (ref 4.2–5.4)
SODIUM BLD-SCNC: 137 MEQ/L (ref 135–145)
WBC # BLD: 11.2 THOU/MM3 (ref 4.8–10.8)

## 2021-07-13 PROCEDURE — 49000 EXPLORATION OF ABDOMEN: CPT | Performed by: SURGERY

## 2021-07-13 PROCEDURE — 2709999900 HC NON-CHARGEABLE SUPPLY: Performed by: SURGERY

## 2021-07-13 PROCEDURE — 3600000004 HC SURGERY LEVEL 4 BASE: Performed by: SURGERY

## 2021-07-13 PROCEDURE — 36415 COLL VENOUS BLD VENIPUNCTURE: CPT

## 2021-07-13 PROCEDURE — 2580000003 HC RX 258: Performed by: SURGERY

## 2021-07-13 PROCEDURE — 80048 BASIC METABOLIC PNL TOTAL CA: CPT

## 2021-07-13 PROCEDURE — 3700000001 HC ADD 15 MINUTES (ANESTHESIA): Performed by: SURGERY

## 2021-07-13 PROCEDURE — 2580000003 HC RX 258: Performed by: PHYSICIAN ASSISTANT

## 2021-07-13 PROCEDURE — 0W9G0ZZ DRAINAGE OF PERITONEAL CAVITY, OPEN APPROACH: ICD-10-PCS | Performed by: SURGERY

## 2021-07-13 PROCEDURE — 6360000002 HC RX W HCPCS: Performed by: PHYSICIAN ASSISTANT

## 2021-07-13 PROCEDURE — 6360000002 HC RX W HCPCS: Performed by: SURGERY

## 2021-07-13 PROCEDURE — 84100 ASSAY OF PHOSPHORUS: CPT

## 2021-07-13 PROCEDURE — 1200000000 HC SEMI PRIVATE

## 2021-07-13 PROCEDURE — 3700000000 HC ANESTHESIA ATTENDED CARE: Performed by: SURGERY

## 2021-07-13 PROCEDURE — 2500000003 HC RX 250 WO HCPCS: Performed by: PHYSICIAN ASSISTANT

## 2021-07-13 PROCEDURE — 2580000003 HC RX 258: Performed by: PHARMACIST

## 2021-07-13 PROCEDURE — APPSS30 APP SPLIT SHARED TIME 16-30 MINUTES: Performed by: NURSE PRACTITIONER

## 2021-07-13 PROCEDURE — 82948 REAGENT STRIP/BLOOD GLUCOSE: CPT

## 2021-07-13 PROCEDURE — 3600000014 HC SURGERY LEVEL 4 ADDTL 15MIN: Performed by: SURGERY

## 2021-07-13 PROCEDURE — 6360000002 HC RX W HCPCS: Performed by: NURSE ANESTHETIST, CERTIFIED REGISTERED

## 2021-07-13 PROCEDURE — 85027 COMPLETE CBC AUTOMATED: CPT

## 2021-07-13 PROCEDURE — 83735 ASSAY OF MAGNESIUM: CPT

## 2021-07-13 PROCEDURE — 82330 ASSAY OF CALCIUM: CPT

## 2021-07-13 PROCEDURE — 7100000001 HC PACU RECOVERY - ADDTL 15 MIN: Performed by: SURGERY

## 2021-07-13 PROCEDURE — 2500000003 HC RX 250 WO HCPCS: Performed by: SURGERY

## 2021-07-13 PROCEDURE — 7100000000 HC PACU RECOVERY - FIRST 15 MIN: Performed by: SURGERY

## 2021-07-13 PROCEDURE — 2500000003 HC RX 250 WO HCPCS: Performed by: NURSE ANESTHETIST, CERTIFIED REGISTERED

## 2021-07-13 PROCEDURE — 6360000002 HC RX W HCPCS: Performed by: OPHTHALMOLOGY

## 2021-07-13 RX ORDER — SODIUM CHLORIDE 0.9 % (FLUSH) 0.9 %
5-40 SYRINGE (ML) INJECTION PRN
Status: DISCONTINUED | OUTPATIENT
Start: 2021-07-13 | End: 2021-07-19 | Stop reason: HOSPADM

## 2021-07-13 RX ORDER — SODIUM CHLORIDE 9 MG/ML
INJECTION, SOLUTION INTRAVENOUS CONTINUOUS
Status: DISCONTINUED | OUTPATIENT
Start: 2021-07-13 | End: 2021-07-13 | Stop reason: SDUPTHER

## 2021-07-13 RX ORDER — HYDROMORPHONE HCL 110MG/55ML
PATIENT CONTROLLED ANALGESIA SYRINGE INTRAVENOUS PRN
Status: DISCONTINUED | OUTPATIENT
Start: 2021-07-13 | End: 2021-07-13 | Stop reason: SDUPTHER

## 2021-07-13 RX ORDER — FENTANYL CITRATE 50 UG/ML
INJECTION, SOLUTION INTRAMUSCULAR; INTRAVENOUS PRN
Status: DISCONTINUED | OUTPATIENT
Start: 2021-07-13 | End: 2021-07-13 | Stop reason: SDUPTHER

## 2021-07-13 RX ORDER — MEPERIDINE HYDROCHLORIDE 25 MG/ML
12.5 INJECTION INTRAMUSCULAR; INTRAVENOUS; SUBCUTANEOUS EVERY 5 MIN PRN
Status: DISCONTINUED | OUTPATIENT
Start: 2021-07-13 | End: 2021-07-13 | Stop reason: HOSPADM

## 2021-07-13 RX ORDER — HYDROCODONE BITARTRATE AND ACETAMINOPHEN 5; 325 MG/1; MG/1
2 TABLET ORAL EVERY 4 HOURS PRN
Status: DISCONTINUED | OUTPATIENT
Start: 2021-07-13 | End: 2021-07-19 | Stop reason: HOSPADM

## 2021-07-13 RX ORDER — LIDOCAINE HCL/PF 100 MG/5ML
SYRINGE (ML) INJECTION PRN
Status: DISCONTINUED | OUTPATIENT
Start: 2021-07-13 | End: 2021-07-13 | Stop reason: SDUPTHER

## 2021-07-13 RX ORDER — ONDANSETRON 4 MG/1
4 TABLET, ORALLY DISINTEGRATING ORAL EVERY 8 HOURS PRN
Status: DISCONTINUED | OUTPATIENT
Start: 2021-07-13 | End: 2021-07-19 | Stop reason: HOSPADM

## 2021-07-13 RX ORDER — SODIUM CHLORIDE 9 MG/ML
25 INJECTION, SOLUTION INTRAVENOUS PRN
Status: DISCONTINUED | OUTPATIENT
Start: 2021-07-13 | End: 2021-07-19 | Stop reason: HOSPADM

## 2021-07-13 RX ORDER — BUPIVACAINE HYDROCHLORIDE 5 MG/ML
INJECTION, SOLUTION EPIDURAL; INTRACAUDAL PRN
Status: DISCONTINUED | OUTPATIENT
Start: 2021-07-13 | End: 2021-07-13 | Stop reason: ALTCHOICE

## 2021-07-13 RX ORDER — MORPHINE SULFATE 2 MG/ML
2 INJECTION, SOLUTION INTRAMUSCULAR; INTRAVENOUS
Status: DISCONTINUED | OUTPATIENT
Start: 2021-07-13 | End: 2021-07-13 | Stop reason: SDUPTHER

## 2021-07-13 RX ORDER — GLYCOPYRROLATE 1 MG/5 ML
SYRINGE (ML) INTRAVENOUS PRN
Status: DISCONTINUED | OUTPATIENT
Start: 2021-07-13 | End: 2021-07-13 | Stop reason: SDUPTHER

## 2021-07-13 RX ORDER — ONDANSETRON 2 MG/ML
INJECTION INTRAMUSCULAR; INTRAVENOUS PRN
Status: DISCONTINUED | OUTPATIENT
Start: 2021-07-13 | End: 2021-07-13 | Stop reason: SDUPTHER

## 2021-07-13 RX ORDER — ONDANSETRON 2 MG/ML
4 INJECTION INTRAMUSCULAR; INTRAVENOUS
Status: DISCONTINUED | OUTPATIENT
Start: 2021-07-13 | End: 2021-07-13 | Stop reason: HOSPADM

## 2021-07-13 RX ORDER — PROPOFOL 10 MG/ML
INJECTION, EMULSION INTRAVENOUS PRN
Status: DISCONTINUED | OUTPATIENT
Start: 2021-07-13 | End: 2021-07-13 | Stop reason: SDUPTHER

## 2021-07-13 RX ORDER — FENTANYL CITRATE 50 UG/ML
50 INJECTION, SOLUTION INTRAMUSCULAR; INTRAVENOUS EVERY 5 MIN PRN
Status: DISCONTINUED | OUTPATIENT
Start: 2021-07-13 | End: 2021-07-13 | Stop reason: HOSPADM

## 2021-07-13 RX ORDER — NEOSTIGMINE METHYLSULFATE 5 MG/5 ML
SYRINGE (ML) INTRAVENOUS PRN
Status: DISCONTINUED | OUTPATIENT
Start: 2021-07-13 | End: 2021-07-13 | Stop reason: SDUPTHER

## 2021-07-13 RX ORDER — DEXAMETHASONE SODIUM PHOSPHATE 10 MG/ML
INJECTION, EMULSION INTRAMUSCULAR; INTRAVENOUS PRN
Status: DISCONTINUED | OUTPATIENT
Start: 2021-07-13 | End: 2021-07-13 | Stop reason: SDUPTHER

## 2021-07-13 RX ORDER — ONDANSETRON 2 MG/ML
4 INJECTION INTRAMUSCULAR; INTRAVENOUS EVERY 6 HOURS PRN
Status: DISCONTINUED | OUTPATIENT
Start: 2021-07-13 | End: 2021-07-19 | Stop reason: HOSPADM

## 2021-07-13 RX ORDER — HYDRALAZINE HYDROCHLORIDE 20 MG/ML
5 INJECTION INTRAMUSCULAR; INTRAVENOUS EVERY 10 MIN PRN
Status: DISCONTINUED | OUTPATIENT
Start: 2021-07-13 | End: 2021-07-13 | Stop reason: HOSPADM

## 2021-07-13 RX ORDER — MIDAZOLAM HYDROCHLORIDE 1 MG/ML
INJECTION INTRAMUSCULAR; INTRAVENOUS PRN
Status: DISCONTINUED | OUTPATIENT
Start: 2021-07-13 | End: 2021-07-13 | Stop reason: SDUPTHER

## 2021-07-13 RX ORDER — MORPHINE SULFATE 4 MG/ML
4 INJECTION, SOLUTION INTRAMUSCULAR; INTRAVENOUS
Status: DISCONTINUED | OUTPATIENT
Start: 2021-07-13 | End: 2021-07-13 | Stop reason: SDUPTHER

## 2021-07-13 RX ORDER — MORPHINE SULFATE 2 MG/ML
2 INJECTION, SOLUTION INTRAMUSCULAR; INTRAVENOUS EVERY 5 MIN PRN
Status: DISCONTINUED | OUTPATIENT
Start: 2021-07-13 | End: 2021-07-13 | Stop reason: HOSPADM

## 2021-07-13 RX ORDER — DIPHENHYDRAMINE HYDROCHLORIDE 50 MG/ML
12.5 INJECTION INTRAMUSCULAR; INTRAVENOUS
Status: DISCONTINUED | OUTPATIENT
Start: 2021-07-13 | End: 2021-07-13 | Stop reason: HOSPADM

## 2021-07-13 RX ORDER — SODIUM CHLORIDE 9 MG/ML
INJECTION, SOLUTION INTRAVENOUS CONTINUOUS
Status: DISCONTINUED | OUTPATIENT
Start: 2021-07-13 | End: 2021-07-19 | Stop reason: HOSPADM

## 2021-07-13 RX ORDER — HYDROCODONE BITARTRATE AND ACETAMINOPHEN 5; 325 MG/1; MG/1
1 TABLET ORAL EVERY 4 HOURS PRN
Status: DISCONTINUED | OUTPATIENT
Start: 2021-07-13 | End: 2021-07-19 | Stop reason: HOSPADM

## 2021-07-13 RX ORDER — LABETALOL 20 MG/4 ML (5 MG/ML) INTRAVENOUS SYRINGE
5 4 TIMES DAILY PRN
Status: DISCONTINUED | OUTPATIENT
Start: 2021-07-13 | End: 2021-07-13 | Stop reason: HOSPADM

## 2021-07-13 RX ORDER — ROCURONIUM BROMIDE 10 MG/ML
INJECTION, SOLUTION INTRAVENOUS PRN
Status: DISCONTINUED | OUTPATIENT
Start: 2021-07-13 | End: 2021-07-13 | Stop reason: SDUPTHER

## 2021-07-13 RX ORDER — SODIUM CHLORIDE 0.9 % (FLUSH) 0.9 %
5-40 SYRINGE (ML) INJECTION EVERY 12 HOURS SCHEDULED
Status: DISCONTINUED | OUTPATIENT
Start: 2021-07-13 | End: 2021-07-19 | Stop reason: HOSPADM

## 2021-07-13 RX ADMIN — METRONIDAZOLE 500 MG: 500 INJECTION, SOLUTION INTRAVENOUS at 12:00

## 2021-07-13 RX ADMIN — ROCURONIUM BROMIDE 40 MG: 10 INJECTION INTRAVENOUS at 14:41

## 2021-07-13 RX ADMIN — SODIUM CHLORIDE: 9 INJECTION, SOLUTION INTRAVENOUS at 14:53

## 2021-07-13 RX ADMIN — ONDANSETRON HYDROCHLORIDE 4 MG: 4 INJECTION, SOLUTION INTRAMUSCULAR; INTRAVENOUS at 15:05

## 2021-07-13 RX ADMIN — PROPOFOL 150 MG: 10 INJECTION, EMULSION INTRAVENOUS at 14:41

## 2021-07-13 RX ADMIN — MORPHINE SULFATE 4 MG: 4 INJECTION, SOLUTION INTRAMUSCULAR; INTRAVENOUS at 21:23

## 2021-07-13 RX ADMIN — Medication 70 MG: at 14:41

## 2021-07-13 RX ADMIN — MORPHINE SULFATE 2 MG: 2 INJECTION, SOLUTION INTRAMUSCULAR; INTRAVENOUS at 12:00

## 2021-07-13 RX ADMIN — FENTANYL CITRATE 100 MCG: 50 INJECTION, SOLUTION INTRAMUSCULAR; INTRAVENOUS at 14:41

## 2021-07-13 RX ADMIN — METRONIDAZOLE 500 MG: 500 INJECTION, SOLUTION INTRAVENOUS at 03:47

## 2021-07-13 RX ADMIN — MORPHINE SULFATE 2 MG: 2 INJECTION, SOLUTION INTRAMUSCULAR; INTRAVENOUS at 17:14

## 2021-07-13 RX ADMIN — DEXAMETHASONE SODIUM PHOSPHATE 10 MG: 10 INJECTION, EMULSION INTRAMUSCULAR; INTRAVENOUS at 15:05

## 2021-07-13 RX ADMIN — SODIUM CHLORIDE, PRESERVATIVE FREE 10 ML: 5 INJECTION INTRAVENOUS at 20:44

## 2021-07-13 RX ADMIN — MIDAZOLAM 2 MG: 1 INJECTION INTRAMUSCULAR; INTRAVENOUS at 14:41

## 2021-07-13 RX ADMIN — MORPHINE SULFATE 2 MG: 2 INJECTION, SOLUTION INTRAMUSCULAR; INTRAVENOUS at 03:42

## 2021-07-13 RX ADMIN — FAMOTIDINE 20 MG: 10 INJECTION INTRAVENOUS at 20:44

## 2021-07-13 RX ADMIN — FAMOTIDINE 20 MG: 10 INJECTION INTRAVENOUS at 08:35

## 2021-07-13 RX ADMIN — SODIUM CHLORIDE: 9 INJECTION, SOLUTION INTRAVENOUS at 15:53

## 2021-07-13 RX ADMIN — METRONIDAZOLE 500 MG: 500 INJECTION, SOLUTION INTRAVENOUS at 20:44

## 2021-07-13 RX ADMIN — MORPHINE SULFATE 2 MG: 2 INJECTION, SOLUTION INTRAMUSCULAR; INTRAVENOUS at 06:31

## 2021-07-13 RX ADMIN — SODIUM CHLORIDE, PRESERVATIVE FREE 10 ML: 5 INJECTION INTRAVENOUS at 08:35

## 2021-07-13 RX ADMIN — Medication 3 MG: at 15:50

## 2021-07-13 RX ADMIN — HYDROMORPHONE HYDROCHLORIDE 0.25 MG: 2 INJECTION INTRAMUSCULAR; INTRAVENOUS; SUBCUTANEOUS at 16:04

## 2021-07-13 RX ADMIN — MORPHINE SULFATE 2 MG: 2 INJECTION, SOLUTION INTRAMUSCULAR; INTRAVENOUS at 08:35

## 2021-07-13 RX ADMIN — CALCIUM GLUCONATE: 98 INJECTION, SOLUTION INTRAVENOUS at 17:54

## 2021-07-13 RX ADMIN — MORPHINE SULFATE 4 MG: 4 INJECTION, SOLUTION INTRAMUSCULAR; INTRAVENOUS at 23:34

## 2021-07-13 RX ADMIN — Medication 0.6 MG: at 15:50

## 2021-07-13 RX ADMIN — METHYLPREDNISOLONE SODIUM SUCCINATE 30 MG: 40 INJECTION, POWDER, FOR SOLUTION INTRAMUSCULAR; INTRAVENOUS at 08:35

## 2021-07-13 RX ADMIN — MORPHINE SULFATE 4 MG: 4 INJECTION, SOLUTION INTRAMUSCULAR; INTRAVENOUS at 19:16

## 2021-07-13 RX ADMIN — LEVOFLOXACIN 750 MG: 5 INJECTION, SOLUTION INTRAVENOUS at 22:19

## 2021-07-13 ASSESSMENT — PULMONARY FUNCTION TESTS
PIF_VALUE: 18
PIF_VALUE: 15
PIF_VALUE: 14
PIF_VALUE: 1
PIF_VALUE: 1
PIF_VALUE: 26
PIF_VALUE: 15
PIF_VALUE: 15
PIF_VALUE: 9
PIF_VALUE: 15
PIF_VALUE: 15
PIF_VALUE: 1
PIF_VALUE: 12
PIF_VALUE: 1
PIF_VALUE: 15
PIF_VALUE: 1
PIF_VALUE: 16
PIF_VALUE: 15
PIF_VALUE: 1
PIF_VALUE: 15
PIF_VALUE: 14
PIF_VALUE: 15
PIF_VALUE: 18
PIF_VALUE: 20
PIF_VALUE: 0
PIF_VALUE: 1
PIF_VALUE: 15
PIF_VALUE: 2
PIF_VALUE: 10
PIF_VALUE: 15
PIF_VALUE: 18
PIF_VALUE: 15
PIF_VALUE: 0
PIF_VALUE: 26
PIF_VALUE: 16
PIF_VALUE: 18
PIF_VALUE: 15
PIF_VALUE: 15
PIF_VALUE: 18
PIF_VALUE: 15
PIF_VALUE: 18
PIF_VALUE: 1
PIF_VALUE: 18
PIF_VALUE: 15
PIF_VALUE: 15
PIF_VALUE: 16
PIF_VALUE: 18
PIF_VALUE: 15
PIF_VALUE: 1
PIF_VALUE: 15
PIF_VALUE: 11
PIF_VALUE: 15
PIF_VALUE: 16
PIF_VALUE: 15
PIF_VALUE: 16
PIF_VALUE: 2
PIF_VALUE: 15
PIF_VALUE: 0
PIF_VALUE: 8
PIF_VALUE: 18
PIF_VALUE: 1
PIF_VALUE: 15
PIF_VALUE: 2
PIF_VALUE: 15
PIF_VALUE: 1
PIF_VALUE: 15
PIF_VALUE: 15
PIF_VALUE: 1
PIF_VALUE: 1
PIF_VALUE: 15
PIF_VALUE: 1
PIF_VALUE: 15
PIF_VALUE: 26
PIF_VALUE: 18

## 2021-07-13 ASSESSMENT — PAIN DESCRIPTION - PAIN TYPE
TYPE: ACUTE PAIN
TYPE: ACUTE PAIN
TYPE: SURGICAL PAIN

## 2021-07-13 ASSESSMENT — PAIN DESCRIPTION - DESCRIPTORS
DESCRIPTORS: SHARP
DESCRIPTORS: BURNING
DESCRIPTORS: ACHING;DULL;DISCOMFORT

## 2021-07-13 ASSESSMENT — PAIN DESCRIPTION - PROGRESSION
CLINICAL_PROGRESSION: GRADUALLY WORSENING

## 2021-07-13 ASSESSMENT — PAIN DESCRIPTION - ORIENTATION
ORIENTATION: RIGHT;LEFT
ORIENTATION: RIGHT;LEFT
ORIENTATION: MID

## 2021-07-13 ASSESSMENT — PAIN DESCRIPTION - LOCATION
LOCATION: ABDOMEN

## 2021-07-13 ASSESSMENT — PAIN SCALES - GENERAL
PAINLEVEL_OUTOF10: 5
PAINLEVEL_OUTOF10: 4
PAINLEVEL_OUTOF10: 9
PAINLEVEL_OUTOF10: 6
PAINLEVEL_OUTOF10: 7
PAINLEVEL_OUTOF10: 2
PAINLEVEL_OUTOF10: 8
PAINLEVEL_OUTOF10: 6
PAINLEVEL_OUTOF10: 3
PAINLEVEL_OUTOF10: 8
PAINLEVEL_OUTOF10: 5
PAINLEVEL_OUTOF10: 8

## 2021-07-13 ASSESSMENT — PAIN - FUNCTIONAL ASSESSMENT
PAIN_FUNCTIONAL_ASSESSMENT: ACTIVITIES ARE NOT PREVENTED
PAIN_FUNCTIONAL_ASSESSMENT: PREVENTS OR INTERFERES SOME ACTIVE ACTIVITIES AND ADLS

## 2021-07-13 ASSESSMENT — PAIN DESCRIPTION - ONSET
ONSET: ON-GOING

## 2021-07-13 ASSESSMENT — PAIN DESCRIPTION - FREQUENCY
FREQUENCY: CONTINUOUS

## 2021-07-13 ASSESSMENT — PAIN SCALES - WONG BAKER: WONGBAKER_NUMERICALRESPONSE: 0

## 2021-07-13 NOTE — PROGRESS NOTES
Gastroenterology Progress Note:     Patient Name:  Leia Lee   MRN: [id number]  [id number]  YOB: 1990  Admit Date: 7/9/2021  8:17 AM  Primary Care Physician: Wellington Shukla MD   5K-11/011-A     Patient seen and examined. 24 hours events and chart reviewed. Subjective: Patient resting in bed, visitor present. She continues to have abdominal pain that is unchanged. She feels her abdominal distention has worsened. Denies nausea & vomiting. No bowel movement overnight or today, but she is passing flatus. No new drainage in percutaneous drains noted. She is scheduled for an exploratory laparotomy today at 1400 with Dr. Ryan Robison. Objective:  /72   Pulse 82   Temp 98.3 °F (36.8 °C) (Oral)   Resp 16   Ht 4' 11\" (1.499 m)   Wt 90 lb (40.8 kg)   LMP 07/09/2021   SpO2 95%   BMI 18.18 kg/m²     Physical Exam:    General:  Nourished in no distress  HEENT: Atraumatic, normocephalic. Moist oral mucous membranes. Neck: Supple without adenopathy, JVD, thyromegaly or masses. Trachea midline. CV: Heart RRR, no murmurs, rubs, gallops. Resp: Even, easy without cough or accessory use. Lungs clear to ascultation bilaterally. Abd: Round, semi-firm, diffusely tender with palpation. No hepatosplenomegaly or mass present. Hypoactive bowel sounds heard. Distention noted. Percutaneous drains intact & patent. Ext:  Without cyanosis, clubbing, edema. Skin: Pink, warm, dry  Neuro:  Alert, oriented x 3 with no obvious deficits.        Rectal: deferred    Labs:   CBC:   Lab Results   Component Value Date    WBC 11.2 07/13/2021    HGB 11.0 07/13/2021    HCT 36.7 07/13/2021    MCV 83.4 07/13/2021     07/13/2021     BMP:   Lab Results   Component Value Date     07/13/2021    K 3.8 07/13/2021    K 4.1 07/09/2021     07/13/2021    CO2 29 07/13/2021    PHOS 3.0 07/13/2021    BUN 12 07/13/2021    CREATININE 0.4 07/13/2021    CALCIUM 8.3 07/13/2021     Lipids:   Lab Results   Component Value Date    ALKPHOS 102 07/10/2021    ALT 35 07/10/2021    AST 12 07/10/2021    BILITOT 1.2 07/10/2021    LABALBU 1.9 07/10/2021    LIPASE 23.2 06/29/2021     Current Meds:  Scheduled Meds:   levofloxacin  750 mg Intravenous Q24H    insulin lispro  0-12 Units Subcutaneous Q6H    methylPREDNISolone  30 mg Intravenous Daily    enoxaparin  40 mg Subcutaneous Daily    sodium chloride flush  5-40 mL Intravenous 2 times per day    metroNIDAZOLE  500 mg Intravenous Q8H    famotidine (PEPCID) injection  20 mg Intravenous BID     Continuous Infusions:   sodium chloride 50 mL/hr at 07/12/21 2037    PN-Adult Premix 4.25/5 - Peripheral Line 50 mL/hr at 07/12/21 2207    sodium chloride         Assessment:  30 yo F admitted 07/09/21 for abd pain. CT A/P demonstrated large intra-abdominal abscesses, surgery following, IR placed percutaneous drain x 2 07/09/21. Recent hospitalization for abd pain, Crohn's exacerbation secondary to loss of Humira due to loss of insurance, discharged on steroids. IM following for steroid tapering, concern for adrenal insufficiency. IV Solumedrol restarted 7/10/21. AM Cortisol 13.08. Repeat CT A/P demonstrated slight decreased size of the intra-abdominal fluid collections.      1. Large intra-abdominal abscesses s/p percutaneous drain x 2- surgery following  2. Leukocytosis- on steroids  3. Crohn's exacerbation secondary to loss of Humira due to loss of insurance- Humira approved by insurance & delivered to patient last week  4. Elevated LFTs  5. Pleural effusions  6. Hypoalbuminemia  7.  Long-term steroid use     Plan:    · Monitor H & H, transfuse  · ATBs per ID  · Steroid taper per IM for risk of adrenal insufficiency/withdrawal, currently on IVP 30mg daily  · Humira currently on hold, keep in mind patient has not received a dose of Humira since April, has moderate to severe active colitis secondary to Crohn's not treated since April  · Diet per surgery  · Pain & nausea control per

## 2021-07-13 NOTE — PROGRESS NOTES
Pt put on call light and asked for ice chips. Upon entering room this RN noticed that the pt's face was flushed. This is a new finding. Pt stated that she feels hot, pt is afebrile. Will continue to monitor pt s/p ex lap with general anesthesia.

## 2021-07-13 NOTE — CARE COORDINATION
7/13/21, 10:31 AM EDT    DISCHARGE ON GOING [address] day: 4  Location: -11/011-A Reason for admit: Intra-abdominal abscess [facility]) [K65.1]   Procedure: 7/13/2021 planned exploratory laparotomy. Barriers to Discharge: ID and Hospitalist following, IV fluids, IV Levaquin, IV Flagyl, Solu-Medrol, IV Pepcid, TPN per pharmacy dosing, prn medications, incentive spirometry, SCD's, up as tolerated with assistance. PCP: Citlali Montoya MD  Readmission Risk Score: 13%  Patient Goals/Plan/Treatment Preferences: Genoveva Muñiz is from home with her . OR today, monitoring for needs post-op.

## 2021-07-13 NOTE — PROGRESS NOTES
Intravenous BID     Continuous Infusions:   sodium chloride 50 mL/hr at 21    PN-Adult Premix 4.25/5 - Peripheral Line 50 mL/hr at 21    sodium chloride       PRN Meds:.sodium chloride flush, sodium chloride, ondansetron **OR** ondansetron, morphine **OR** morphine  OBJECTIVE   CURRENT VITALS:  height is 4' 11\" (1.499 m) and weight is 90 lb (40.8 kg). Her oral temperature is 98.3 °F (36.8 °C). Her blood pressure is 107/72 and her pulse is 82. Her respiration is 16 and oxygen saturation is 95%. Temperature Range (24h):Temp: 98.3 °F (36.8 °C) Temp  Av.2 °F (36.8 °C)  Min: 97.6 °F (36.4 °C)  Max: 98.8 °F (37.1 °C)  BP Range (76R): Systolic (09IBK), XMT:327 , Min:103 , SCE:879     Diastolic (89KHD), AQD:19, Min:65, Max:77    Pulse Range (24h): Pulse  Av.8  Min: 66  Max: 83  Respiration Range (24h): Resp  Av  Min: 16  Max: 16  Current Pulse Ox (24h):  SpO2: 95 %  Pulse Ox Range (24h):  SpO2  Av %  Min: 95 %  Max: 97 %  Oxygen Amount and Delivery:    Incentive Spirometry Tx:            GENERAL: alert, cooperative, no distress  SKIN: Skin color, texture, turgor normal. No rashes or lesions. HEENT: Head is normocephalic, atraumatic. NECK: Supple, symmetrical, trachea midline  LUNGS: clear to ausculation, without wheezes, rales or rhonci  HEART: Regular rate and regular rhythm  ABDOMEN: soft, generalized tenderness, distended, hypoactive bowel sounds present. NEUROLOGIC: There are no focalizing motor or sensory deficits. CN II-XII are grossly intact. EXTREMITIES: no cyanosis, no clubbing and no edema. In: 0867 [I.V.:1208]  Out: 0   Date 21 0000 - 21 2359   Shift 0620-6176 6123-3700 2104-0546 24 Hour Total   INTAKE   P.O. 0   0   I. V.(mL/kg/hr) 1198(3.7) 10  1208   Shift Total(mL/kg) 2113(77.5) 10(0.2)  1208(29.6)   OUTPUT   Drains 0   0   Shift Total(mL/kg) 0(0)   0(0)   Weight (kg) 40.8 40.8 40.8 40.8     LABS     Recent Labs     21  6503 07/12/21  0604 07/13/21  0752   WBC 25.7* 23.5* 11.2*   HGB 10.5* 10.4* 11.0*   HCT 34.5* 34.5* 36.7*    253 256    136 137   K 4.0 3.6 3.8    101 101   CO2 26 24 29   BUN 12 14 12   CREATININE 0.3* 0.5 0.4   MG  --   --  1.8   PHOS  --  3.7 3.0   CALCIUM 8.4* 8.2* 8.3*      No results for input(s): PTT, INR in the last 72 hours. Invalid input(s): PT  No results for input(s): AST, ALT, BILITOT, BILIDIR, AMYLASE, LIPASE, LDH, LACTA in the last 72 hours. No results for input(s): TROPONINT in the last 72 hours. RADIOLOGY     CT abdomen/pelvis with intravenous contrast       Comparison:  CT,[facility]  - CT ABDOMEN PELVIS W IV CONTRAST  - 07/09/2021    10:14 AM EDT       Findings:   Bilateral pleural effusions and compressive atelectasis is noted.  The    pleural effusions are similar to slightly larger compared with the    previous study.  The heart is grossly unremarkable.       Right hepatic lobe of the liver is somewhat heterogeneous. Juris Yaneli is a 1.1    cm low-attenuation lesion in the right hepatic lobe on image 29 which is    nonspecific.  The gallbladder and biliary system are within normal limits. The spleen is unremarkable. The pancreas is within normal limits. The adrenal glands are unremarkable. There is no hydronephrosis or nephrolithiasis.    There is a drainage catheter in the abdomen on the right.  The fluid and    gas collection on the right has decreased in size and now measures    approximately 10 cm x 1.8 cm compared with 13 cm x 5.5 cm on the previous    study.  There is a catheter in the fluid collection in the left hemipelvis    as well.  That collection previously measured 11.5 cm x 4.6 cm and now    measures 9.5 cm x 3.8 cm.  There is a separate fluid collection in the    posterior cul-de-sac which is slightly smaller compared to the prior    study.       The appendix is not definitively seen.  The pelvic structures are    unremarkable.       No acute fracture or dislocation.           Impression   Impression:   1.  Slight decreased size of the intra-abdominal fluid collections.  The    catheters are in place as detailed above. 2.  Other incidental findings as above.       This document has been electronically signed by: Lina Rangel MD on    07/12/2021 07:12 AM       All CTs at this facility use dose modulation techniques and iterative    reconstructions, and/or weight-based dosing   when appropriate to reduce radiation to a low as reasonably achievable. Electronically signed by PRAVEENA Liu CNP on 7/13/2021 at 12:29 PM     Patient seen and examined independently by me early this AM. Above discussed and I agree with Alphonso Giles CNP. See my additional comments below for updated orders and plan. Labs, cultures, and radiographs where available were reviewed. I discussed patient concerns with the patient's nurse and instructions were given. Please see our orders for the updated patient care plan. -Discussed CT scan again today. Not much coming out of either drain now. Discussed with infectious disease. All are in agreement to proceed with exploration today. Further washout. Weaning off of steroids as possible. DVT prophylaxis. Consent. NPO. IV antibiotics. Risks, benefits and alternatives discussed with patient and family in detail. All questions answered. They all wish to proceed.     Electronically signed by Leo Torres MD on 7/13/21 at 2:52 PM EDT

## 2021-07-13 NOTE — ANESTHESIA PRE PROCEDURE
Department of Anesthesiology  Preprocedure Note       Name:  Rigoberto Bustamante   Age:  31 y.o.  :  1990                                          MRN:  [id number]         Date:  2021      Surgeon: Polly Norris):  Oz Mendoza MD    Procedure: Procedure(s):  LAPAROTOMY EXPLORATORY, POSS BOWEL RESECTION    Medications prior to admission:   Prior to Admission medications    Medication Sig Start Date End Date Taking?  Authorizing Provider   predniSONE (DELTASONE) 20 MG tablet Take 1 tablet by mouth 2 times daily for 10 days 21 Yes Inocencia Arias MD   hyoscyamine (OSCIMIN) 125 MCG TBDP dispersible tablet Take 1 tablet by mouth every 4 hours as needed (abd cramping) 21  Yes Inocencia Arias MD   adalimumab (HUMIRA) 40 MG/0.8ML injection Inject 0.8 mLs into the skin once for 1 dose 21  Inocencia Arias MD       Current medications:    Current Facility-Administered Medications   Medication Dose Route Frequency Provider Last Rate Last Admin    0.9 % sodium chloride infusion   Intravenous Continuous Crystal ROLANDO Gray, RPH        PN-Adult  3 IN 1 Central Line (Custom)   Intravenous Continuous TPN KAMILLA Naranjo [facility]        levoFLOXacin (LEVAQUIN) 750 MG/150ML infusion 750 mg  750 mg Intravenous Q24H Chapincito Rogers MD   Stopped at 21 2346    0.9 % sodium chloride infusion   Intravenous Continuous Valora Levels, RPH 50 mL/hr at 21 New Bag at 21    insulin lispro (HUMALOG) injection vial 0-12 Units  0-12 Units Subcutaneous Q6H PRAVEENA Souza - CNP        PN-Adult Premixed (4.25/5) Solution-Custom   Intravenous Continuous TPN PRAVEENA Souza - CNP 50 mL/hr at 21 New Bag at 21    methylPREDNISolone sodium (SOLU-MEDROL) injection 30 mg  30 mg Intravenous Daily Anibal Lowery MD   30 mg at 21 0835    enoxaparin (LOVENOX) injection 40 mg  40 mg Subcutaneous Daily Oz Mendoza MD  sodium chloride flush 0.9 % injection 5-40 mL  5-40 mL Intravenous 2 times per day Calib Valdez, PA-C   10 mL at 21 0835    sodium chloride flush 0.9 % injection 5-40 mL  5-40 mL Intravenous PRN Calib Valdez, PA-C        0.9 % sodium chloride infusion  25 mL Intravenous PRN Calib Valdez, PA-C        ondansetron (ZOFRAN-ODT) disintegrating tablet 4 mg  4 mg Oral Q8H PRN Calib Valdez, PA-C        Or    ondansetron (ZOFRAN) injection 4 mg  4 mg Intravenous Q6H PRN Calib Valdez, PA-C        metronidazole (FLAGYL) 500 mg in NaCl 100 mL IVPB premix  500 mg Intravenous Q8H Calib Valdez, PA-C   Stopped at 21 1300    morphine (PF) injection 2 mg  2 mg Intravenous Q2H PRN Calib Valdez, PA-C   2 mg at 21 1200    Or    morphine injection 4 mg  4 mg Intravenous Q2H PRN Calib Valdez, PA-C   4 mg at 21 1751    famotidine (PEPCID) injection 20 mg  20 mg Intravenous BID Calib Valdez, PA-C   20 mg at 21 0835       Allergies:     Allergies   Allergen Reactions    Duricef [Cefadroxil] Anaphylaxis    Penicillins Rash       Problem List:    Patient Active Problem List   Diagnosis Code    Single delivery by  O82    Abdominal pain R10.9    Crohn's disease of large intestine without complication ([address] D17.65    Intra-abdominal abscess ([address] K65.1    Crohn's disease of small intestine with abscess ([address] K50.014    Severe malnutrition ([address] E43       Past Medical History:        Diagnosis Date    Crohn's colitis ([address]        Past Surgical History:        Procedure Laterality Date    CT DRAIN SOFT TISSUE ABSCESS  2021    CT DRAIN SOFT TISSUE ABSCESS 2021 [facility] CT SCAN    CT DRAIN SOFT TISSUE ABSCESS  2021    CT DRAIN SOFT TISSUE ABSCESS 2021 [facility] CT SCAN    GA  DELIVERY ONLY N/A 10/12/2018     SECTION performed by Stanton Ramos MD at [facility]  EXTRACTION         Social History:    Social History     Tobacco Use    Smoking status: Never Smoker    Smokeless tobacco: Never Used   Substance Use Topics    Alcohol use: No                                Counseling given: Not Answered      Vital Signs (Current):   Vitals:    07/12/21 2015 07/13/21 0330 07/13/21 0631 07/13/21 0815   BP: 110/77 104/69 104/69 107/72   Pulse: 78 80 83 82   Resp: 16 16 16 16   Temp: 98.8 °F (37.1 °C) 97.9 °F (36.6 °C)  98.3 °F (36.8 °C)   TempSrc: Oral Oral  Oral   SpO2: 97% 96%  95%   Weight:       Height:                                                  BP Readings from Last 3 Encounters:   07/13/21 107/72   07/04/21 124/74   10/14/18 113/73       NPO Status:                                                                                 BMI:   Wt Readings from Last 3 Encounters:   07/09/21 90 lb (40.8 kg)   07/01/21 94 lb 3.2 oz (42.7 kg)   10/11/18 118 lb (53.5 kg)     Body mass index is 18.18 kg/m².     CBC:   Lab Results   Component Value Date    WBC 11.2 07/13/2021    RBC 4.40 07/13/2021    RBC 5.27 11/08/2019    HGB 11.0 07/13/2021    HCT 36.7 07/13/2021    MCV 83.4 07/13/2021    RDW 13.5 11/08/2019     07/13/2021       CMP:   Lab Results   Component Value Date     07/13/2021    K 3.8 07/13/2021    K 4.1 07/09/2021     07/13/2021    CO2 29 07/13/2021    BUN 12 07/13/2021    CREATININE 0.4 07/13/2021    LABGLOM >90 07/13/2021    GLUCOSE 117 07/13/2021    GLUCOSE 108 11/08/2019    PROT 4.6 07/10/2021    CALCIUM 8.3 07/13/2021    BILITOT 1.2 07/10/2021    ALKPHOS 102 07/10/2021    AST 12 07/10/2021    ALT 35 07/10/2021       POC Tests:   Recent Labs     07/13/21  1134   POCGLU 132*       Coags: No results found for: PROTIME, INR, APTT    HCG (If Applicable):   Lab Results   Component Value Date    PREGSERUM NEGATIVE 07/09/2021        ABGs: No results found for: PHART, PO2ART, IJC3TJF, UUJ8CNV, BEART, H0FMJQXW     Type & Screen (If Applicable):  Lab Results   Component Value Date    LABRH POS 10/11/2018       Drug/Infectious Status (If Applicable):  No results

## 2021-07-13 NOTE — PLAN OF CARE
Problem: Pain:  Goal: Pain level will decrease  Description: Pain level will decrease  Note: Patient complaining of abdominal pain this shift. PRN morphine available for pain control. Patient encouraged to rest and reposition for additional comfort needs. Problem: Infection - Surgical Site:  Goal: Will show no infection signs and symptoms  Description: Will show no infection signs and symptoms  Outcome: Ongoing  Note: No new signs of infection so far this shift. Afebrile. Patient receiving IV Levaquin and flagyl. Patient provided with chg soap and encouraged to use at least once daily to prevent infection. Problem: GI  Goal: No bowel complications  Outcome: Ongoing  Note: Patient having exploratory lap today. 2 JAYMIE drains in place. Complaining of abdominal pain. Receiving PPN. Problem:   Goal: Adequate urinary output  Outcome: Ongoing  Note: Patient having adequate urinary output so far this shift. Problem: Nutrition  Goal: Optimal nutrition therapy  Outcome: Ongoing  Note: PPN running through peripheral IV. NPO at this time. Problem: Discharge Planning  Intervention: Interaction with patient/family and care team  Note: Patient from home with . Unknown discharge plans at this time. Care plan reviewed with patient. Patient verbalizes understanding of the plan of care and contributes to goal setting.

## 2021-07-13 NOTE — PLAN OF CARE
Problem: Pain:  Goal: Control of acute pain  Description: Control of acute pain  Outcome: Ongoing  Pain Assessment: 0-10  Pain Level: 3   Patient's Stated Pain Goal: 3   Is pain goal met at this time? Yes     Non-Pharmaceutical Pain Intervention(s): Rest, Repositioned     Problem: Infection - Surgical Site:  Goal: Will show no infection signs and symptoms  Description: Will show no infection signs and symptoms  7/13/2021 1508 by Lonny Kanner, RN  Outcome: Ongoing   Pt afebrile with no s/s of infection at this time. Will continue to monitor. Problem: GI  Goal: No bowel complications  8/94/2230 1508 by Lonny Kanner, RN  Outcome: Ongoing  Patient bowel sounds hypoactive. Pt to have abdominal surgery today. Problem:   Goal: Adequate urinary output  7/13/2021 1508 by Lonny Kanner, RN  Outcome: Ongoing  Patient voiding adequate amounts without difficulty. Problem: Nutrition  Goal: Optimal nutrition therapy  7/13/2021 1508 by Lonny Kanner, RN  Outcome: Ongoing   Patients appetite poor. Pt receiving PPN at 50 and IV fluids at 50 ml/hr. Care plan reviewed with patient. Patient verbalizes understanding of the plan of care and contributes to goal setting.

## 2021-07-13 NOTE — ANESTHESIA POSTPROCEDURE EVALUATION
Department of Anesthesiology  Postprocedure Note    Patient: Saúl Linder  MRN: [id number]  YOB: 1990  Date of evaluation: 7/13/2021  Time:  6:42 PM     Procedure Summary     Date: 07/13/21 Room / Location: [address] [facility]    Anesthesia Start: 1428 Anesthesia Stop: 1604    Procedure: LAPAROTOMY EXPLORATORY, WASHOUT, AND DRAIN PLACEMENT (N/A Abdomen) Diagnosis: (ABDOMINAL PAIN)    Surgeons: Louie Jones MD Responsible Provider: Myrtie Sever, MD    Anesthesia Type: general ASA Status: 2          Anesthesia Type: general    Bonnie Phase I: Bonnie Score: 8    Bonnie Phase II:      Last vitals: Reviewed and per EMR flowsheets. Anesthesia Post Evaluation    Patient location during evaluation: PACU  Patient participation: complete - patient participated  Level of consciousness: awake and alert  Airway patency: patent  Nausea & Vomiting: no nausea and no vomiting  Complications: no  Cardiovascular status: hemodynamically stable  Respiratory status: acceptable  Hydration status: euvolemic      [address]  POST-ANESTHESIA NOTE       Name:  Saúl Linder                                         Age:  31 y.o.   MRN:  [id number]      Last Vitals:  /73   Pulse 75   Temp 97.4 °F (36.3 °C) (Oral)   Resp 22   Ht 4' 11\" (1.499 m)   Wt 90 lb (40.8 kg)   LMP 07/09/2021   SpO2 98%   BMI 18.18 kg/m²   Patient Vitals for the past 4 hrs:   BP Temp Temp src Pulse Resp SpO2   07/13/21 1745 112/73 97.4 °F (36.3 °C) Oral 75 22 98 %   07/13/21 1700 108/70 97.6 °F (36.4 °C) Oral 71 24 97 %   07/13/21 1650 114/73   74 24 98 %   07/13/21 1645 123/75   67 21 98 %   07/13/21 1640 118/74   66 22 98 %   07/13/21 1635 119/73   66 29 98 %   07/13/21 1630 115/73   66 25 99 %   07/13/21 1625 124/76   83 24 98 %   07/13/21 1620 118/73   68 26 99 %   07/13/21 1615 124/71   71 25 98 %   07/13/21 1610 117/74   72 29 98 %   07/13/21 1605 124/79   80 25 96 %   07/13/21 1601 124/80 97.5 °F (36.4 °C) Temporal 69 23 98 %       Level of Consciousness:  Awake    Respiratory:  Stable    Oxygen Saturation:  Stable    Cardiovascular:  Stable    Hydration:  Adequate    PONV:  Stable    Post-op Pain:  Adequate analgesia    Post-op Assessment:  No apparent anesthetic complications    Additional Follow-Up / Treatment / Comment:  None    Kylie Ward MD  July 13, 2021   6:42 PM

## 2021-07-13 NOTE — BRIEF OP NOTE
Brief Postoperative Note      Patient: Maeve Joseph  YOB: 1990  MRN: [id number]    Date of Procedure: 7/13/2021    Pre-Op Diagnosis:   1. Generalized abdominal pain  2. Crohn's disease  3. Large intra-abdominal abscess    Post-Op Diagnosis: Same       Procedure(s):  LAPAROTOMY EXPLORATORY, WASHOUT, AND DRAIN PLACEMENT    Surgeon(s):  Talisha Rock MD    Assistant:  First Assistant: Sanju Hernandez RN    Anesthesia: General/Local    Estimated Blood Loss (mL): 40 ml    Complications: None    Specimens:   * No specimens in log *    Implants:  * No implants in log *      Drains:   Closed/Suction Drain Right; Anterior Abdomen Other (Comment) 14 Ethiopian (Active)   Site Description Unable to view 07/11/21 0408   Dressing Status Clean;Dry; Intact 07/11/21 0408   Drainage Appearance Green 07/11/21 0408   Status Compressed 07/11/21 0408   Output (ml) 0 ml 07/13/21 0334       Closed/Suction Drain Left;Lateral Abdomen Other (Comment) 14 Ethiopian (Active)   Dressing Status Clean;Dry; Intact 07/09/21 1456   Drainage Appearance Yellow 07/10/21 1406   Status Compressed 07/09/21 1456   Output (ml) 0 ml 07/13/21 0334       Closed/Suction Drain Right RLQ Bulb 19 Ethiopian (Active)       Closed/Suction Drain Lateral LLQ Bulb 19 [place] (Active)       Urethral Catheter 16 fr (Active)       Findings: as above - see op note for details    Electronically signed by Talisha Rock MD on 7/13/2021 at 3:46 PM

## 2021-07-13 NOTE — CONSULTS
Comprehensive Nutrition Assessment    Type and Reason for Visit:  Initial, Consult (Verbal Consult for TPN Order and Management)    Nutrition Recommendations/Plan:   *Recommend switching to 3-in-1 PPN tonight based on 10 kcal/kg, 1 g/kg protein and 20% lipid kcals based on dosing weight of 43 kg. *NPO per MD.    Nutrition Assessment: Pt. severely malnourished AEB criteria listed below. At risk for further nutritional compromise r/t admit d/t intra-abdominal abscess s/p percutaneous drain placement x2 and bilateral pleural effusions, NPO at present with need for PPN to meet estimated nutritional needs and underlying medical condition (hx Crohn's disease). Nutrition recommendations/interventions as per above. Malnutrition Assessment:  Malnutrition Status:  Severe malnutrition    Context:  Acute Illness     Findings of the 6 clinical characteristics of malnutrition:  Energy Intake:  7 - 50% or less of estimated energy requirements for 5 or more days  Weight Loss:  No significant weight loss     Body Fat Loss:  7 - Moderate body fat loss Orbital   Muscle Mass Loss:  7 - Moderate muscle mass loss Temples (temporalis)  Fluid Accumulation:  No significant fluid accumulation Extremities   Strength:  Not Performed    Estimated Daily Nutrient Needs:  Energy (kcal):  ~1290 kcal/day (~30 kcal/kg); Weight Used for Energy Requirements:   (43 kg)     Protein (g):  52-65 g/day (1.2-1.5g/kg); Weight Used for Protein Requirements:   (43 kg)        Fluid (ml/day):  1290+ mL/day (~30+ mL/kg); Method Used for Fluid Requirements:  1 ml/kcal      Nutrition Related Findings: admit d/t intra-abdominal abscess s/p percutaneous drain placement x2 and bilateral pleural effusions, NPO at present; pt started on 4.25/5 Clinimix on 7/12- currently running at 50 m/hr;  No plan for PICC at this time; plan to switch to 3-in-1 PPN tonight; pt seen; appears thin; pt reports poor appetite consuming less than 50% of meals over the past week; pt states she was following a Low fiber diet and just snacked occasionally throughout the day; pt denies any weight loss; pt denies any N/V today; last BM x1 on 7/9. Plan exploratory laparotomy with washout on 7/13- possible bowel resection and ileostomy. Labs: Na 137, K+ 3.8, Mg 1.8, Phos 3, BUN 12, Cr. .4. Rx includes: Pepcid, Flagyl and IVF. Wounds:  None       Current Nutrition Therapies:    Diet NPO Exceptions are: Ice Chips, Sips of Water with Meds, Sips of Clear Liquids  PN-Adult Premixed (4.25/5) Solution-Custom  Current Parenteral Nutrition Orders:  · Type and Formula: Premix Peripheral (Clinimix 4.25/5 based on dosing weight of 40.8 kg)   · Lipids: None  · Duration: Continuous  · Rate/Volume: 50 mL/hr/1200 mL total volume  · Current PN Order Provides: 408 kcal, 51 grams protein and 60 grams dextrose  · Goal PN Orders Provides: Recommend switching to 3-in-1 PPN tonight based on 10 kcal/kg, 1 g/kg protein and 20% lipid kcals based on dosing weight of 43 kg to provide: 430 kcal, 43 grams protein, 38 grams dextrose and 129 lipid kcals      Anthropometric Measures:  · Height: 4' 11\" (149.9 cm)  · Current Body Weight: 95 lb 8 oz (43.3 kg) (7/13; bedscale; no edema noted)   · Admission Body Weight: 95 lb 8 oz (43.3 kg) (7/13; bedscale; no edema noted)    · Usual Body Weight:  (Per EMR: 94 lb 3.2 oz on 6/29/21)     · Ideal Body Weight: 98 lbs  · BMI: 19.3  · BMI Categories: Normal Weight (BMI 18.5-24. 9)       Nutrition Diagnosis:   · Severe malnutrition, In context of acute illness or injury related to inadequate protein-energy intake as evidenced by poor intake prior to admission, moderate loss of subcutaneous fat, moderate muscle loss    Nutrition Interventions:   Food and/or Nutrient Delivery:  Continue NPO, Modify Parenteral Nutrition  Nutrition Education/Counseling:  Education initiated   Coordination of Nutrition Care:  Continue to monitor while inpatient    Goals:  Pt will tolerate adequate nutrition

## 2021-07-13 NOTE — PROGRESS NOTES
Pharmacy Consult       TPN    Ordering Provider: Juan Diego Espinal CNP     Indication for TPN: NPO, large intra-abdominal abscess    Macronutrients   DW: 43kg   AA: 1 g/kg   Total Kcal: 10 Kcal/kg   Lipids: 20 % of Total Kcal   Infusion Rate: 75 mL/hr    Electrolytes (per bag)   Na Acetate:    90 meq   NaCl:         80 meq   NaPhos:       6 mmol     KCl:               40 meq     Calcium Gluc:   2.325 meq   Magnesium:      6 meq      MV:      10 mL   Trace Elements: 1 mL    Electrolyte Replacement: none    Assessment/ Plan:  Remains NPO. No plan for PICC at this time, will continue as PPN. Planning to go to OR later today. Will decrease NS to 30 ml/hr when TPN starts.    Sai Vu PharmD, BCPS 7/13/2021 12:54 PM

## 2021-07-13 NOTE — PROGRESS NOTES
Progress note: Infectious diseases    Patient - Angelo Wasserman,  Age - 31 y.o.    - 1990      Room Number - 5K-11/011-A   N -  [id number]   Acct # - [de-identified]  Date of Admission -  2021  8:17 AM    SUBJECTIVE:   No new issues  Still the abdomen is distended. OBJECTIVE   VITALS    height is 4' 11\" (1.499 m) and weight is 90 lb (40.8 kg). Her oral temperature is 98.3 °F (36.8 °C). Her blood pressure is 107/72 and her pulse is 82. Her respiration is 16 and oxygen saturation is 95%.        Wt Readings from Last 3 Encounters:   21 90 lb (40.8 kg)   21 94 lb 3.2 oz (42.7 kg)   10/11/18 118 lb (53.5 kg)       I/O (24 Hours)    Intake/Output Summary (Last 24 hours) at 2021 1140  Last data filed at 2021 6908  Gross per 24 hour   Intake 2238 ml   Output 50 ml   Net 2188 ml       General Appearance  Awake, alert, oriented,  not  In acute distress  HEENT - normocephalic, atraumatic, slighlty pale conjunctiva,  anicteric sclera  Neck - Supple, no mass  Lungs -  Bilateral   air entry, no rhonchi, no wheeze  Cardiovascular - Heart sounds are normal.     Abdomen - soft, not distended, nontender,   Neurologic -oriented  Skin - No bruising or bleeding  Extremities - No edema, no cyanosis, clubbing     MEDICATIONS:      levofloxacin  750 mg Intravenous Q24H    insulin lispro  0-12 Units Subcutaneous Q6H    methylPREDNISolone  30 mg Intravenous Daily    enoxaparin  40 mg Subcutaneous Daily    sodium chloride flush  5-40 mL Intravenous 2 times per day    metroNIDAZOLE  500 mg Intravenous Q8H    famotidine (PEPCID) injection  20 mg Intravenous BID      sodium chloride 50 mL/hr at 21    PN-Adult Premix 4.25/5 - Peripheral Line 50 mL/hr at 21    sodium chloride       sodium chloride flush, sodium chloride, ondansetron **OR** ondansetron, morphine **OR** morphine      LABS:     CBC: Recent Labs     21  0658 21  0604 21  0752   WBC 25.7* 23.5* 11.2*   HGB 10.5* 10.4* 11.0*    253 256     BMP:    Recent Labs     21  0658 21  0604 21  0752    136 137   K 4.0 3.6 3.8    101 101   CO2 26 24 29   BUN 12 14 12   CREATININE 0.3* 0.5 0.4   GLUCOSE 119* 106 117*     Calcium:  Recent Labs     21  0752   CALCIUM 8.3*     Ionized Calcium:No results for input(s): IONCA in the last 72 hours. Magnesium:  Recent Labs     21  0752   MG 1.8     Phosphorus:  Recent Labs     21  0752   PHOS 3.0     BNP:No results for input(s): BNP in the last 72 hours.   Glucose:  Recent Labs     21  2338 21  0541   POCGLU 136* 106         Problem list of patient:     Patient Active Problem List   Diagnosis Code    Single delivery by  O82    Abdominal pain R10.9    Crohn's disease of large intestine without complication ([address] V89.88    Intra-abdominal abscess ([address] K65.1    Crohn's disease of small intestine with abscess ([address] K50.014         ASSESSMENT/PLAN   Intraabdominal abscess : going for I and D  Crohn's disease exacerbation  Continue current iv antibiotics      Batsheva Perry MD, MD, FACP 2021 11:40 AM

## 2021-07-14 LAB
ANION GAP SERPL CALCULATED.3IONS-SCNC: 8 MEQ/L (ref 8–16)
BUN BLDV-MCNC: 10 MG/DL (ref 7–22)
CALCIUM IONIZED: 1.07 MMOL/L (ref 1.12–1.32)
CALCIUM SERPL-MCNC: 8.1 MG/DL (ref 8.5–10.5)
CHLORIDE BLD-SCNC: 105 MEQ/L (ref 98–111)
CO2: 28 MEQ/L (ref 23–33)
CREAT SERPL-MCNC: 0.3 MG/DL (ref 0.4–1.2)
ERYTHROCYTE [DISTWIDTH] IN BLOOD BY AUTOMATED COUNT: 14.9 % (ref 11.5–14.5)
ERYTHROCYTE [DISTWIDTH] IN BLOOD BY AUTOMATED COUNT: 45.4 FL (ref 35–45)
GFR SERPL CREATININE-BSD FRML MDRD: > 90 ML/MIN/1.73M2
GLUCOSE BLD-MCNC: 106 MG/DL (ref 70–108)
GLUCOSE BLD-MCNC: 116 MG/DL (ref 70–108)
GLUCOSE BLD-MCNC: 152 MG/DL (ref 70–108)
GLUCOSE BLD-MCNC: 163 MG/DL (ref 70–108)
HCT VFR BLD CALC: 37.2 % (ref 37–47)
HEMOGLOBIN: 11.1 GM/DL (ref 12–16)
MAGNESIUM: 1.9 MG/DL (ref 1.6–2.4)
MCH RBC QN AUTO: 25.1 PG (ref 26–33)
MCHC RBC AUTO-ENTMCNC: 29.8 GM/DL (ref 32.2–35.5)
MCV RBC AUTO: 84 FL (ref 81–99)
PHOSPHORUS: 3.2 MG/DL (ref 2.4–4.7)
PLATELET # BLD: 278 THOU/MM3 (ref 130–400)
PMV BLD AUTO: 8.9 FL (ref 9.4–12.4)
POTASSIUM REFLEX MAGNESIUM: 3.8 MEQ/L (ref 3.5–5.2)
POTASSIUM SERPL-SCNC: 3.8 MEQ/L (ref 3.5–5.2)
RBC # BLD: 4.43 MILL/MM3 (ref 4.2–5.4)
SODIUM BLD-SCNC: 141 MEQ/L (ref 135–145)
WBC # BLD: 14 THOU/MM3 (ref 4.8–10.8)

## 2021-07-14 PROCEDURE — 2500000003 HC RX 250 WO HCPCS: Performed by: SURGERY

## 2021-07-14 PROCEDURE — 82948 REAGENT STRIP/BLOOD GLUCOSE: CPT

## 2021-07-14 PROCEDURE — 2580000003 HC RX 258: Performed by: SURGERY

## 2021-07-14 PROCEDURE — 80048 BASIC METABOLIC PNL TOTAL CA: CPT

## 2021-07-14 PROCEDURE — 6360000002 HC RX W HCPCS: Performed by: SURGERY

## 2021-07-14 PROCEDURE — 99024 POSTOP FOLLOW-UP VISIT: CPT | Performed by: NURSE PRACTITIONER

## 2021-07-14 PROCEDURE — 99232 SBSQ HOSP IP/OBS MODERATE 35: CPT | Performed by: NURSE PRACTITIONER

## 2021-07-14 PROCEDURE — 83735 ASSAY OF MAGNESIUM: CPT

## 2021-07-14 PROCEDURE — 85027 COMPLETE CBC AUTOMATED: CPT

## 2021-07-14 PROCEDURE — 84100 ASSAY OF PHOSPHORUS: CPT

## 2021-07-14 PROCEDURE — APPSS45 APP SPLIT SHARED TIME 31-45 MINUTES: Performed by: NURSE PRACTITIONER

## 2021-07-14 PROCEDURE — 1200000000 HC SEMI PRIVATE

## 2021-07-14 PROCEDURE — 82330 ASSAY OF CALCIUM: CPT

## 2021-07-14 PROCEDURE — 36415 COLL VENOUS BLD VENIPUNCTURE: CPT

## 2021-07-14 PROCEDURE — 2500000003 HC RX 250 WO HCPCS: Performed by: NURSE PRACTITIONER

## 2021-07-14 PROCEDURE — 6360000002 HC RX W HCPCS: Performed by: NURSE PRACTITIONER

## 2021-07-14 RX ADMIN — MORPHINE SULFATE 4 MG: 4 INJECTION, SOLUTION INTRAMUSCULAR; INTRAVENOUS at 02:12

## 2021-07-14 RX ADMIN — HYDROMORPHONE HYDROCHLORIDE 0.5 MG: 1 INJECTION, SOLUTION INTRAMUSCULAR; INTRAVENOUS; SUBCUTANEOUS at 20:41

## 2021-07-14 RX ADMIN — METRONIDAZOLE 500 MG: 500 INJECTION, SOLUTION INTRAVENOUS at 04:40

## 2021-07-14 RX ADMIN — LEVOFLOXACIN 750 MG: 5 INJECTION, SOLUTION INTRAVENOUS at 21:59

## 2021-07-14 RX ADMIN — FAMOTIDINE 20 MG: 10 INJECTION INTRAVENOUS at 20:41

## 2021-07-14 RX ADMIN — CALCIUM GLUCONATE: 98 INJECTION, SOLUTION INTRAVENOUS at 18:32

## 2021-07-14 RX ADMIN — MORPHINE SULFATE 4 MG: 4 INJECTION, SOLUTION INTRAMUSCULAR; INTRAVENOUS at 04:40

## 2021-07-14 RX ADMIN — METRONIDAZOLE 500 MG: 500 INJECTION, SOLUTION INTRAVENOUS at 13:04

## 2021-07-14 RX ADMIN — HYDROMORPHONE HYDROCHLORIDE 0.25 MG: 1 INJECTION, SOLUTION INTRAMUSCULAR; INTRAVENOUS; SUBCUTANEOUS at 23:52

## 2021-07-14 RX ADMIN — HYDROMORPHONE HYDROCHLORIDE 0.5 MG: 1 INJECTION, SOLUTION INTRAMUSCULAR; INTRAVENOUS; SUBCUTANEOUS at 16:34

## 2021-07-14 RX ADMIN — FAMOTIDINE 20 MG: 10 INJECTION INTRAVENOUS at 08:37

## 2021-07-14 RX ADMIN — METRONIDAZOLE 500 MG: 500 INJECTION, SOLUTION INTRAVENOUS at 20:40

## 2021-07-14 RX ADMIN — SODIUM CHLORIDE, PRESERVATIVE FREE 10 ML: 5 INJECTION INTRAVENOUS at 08:38

## 2021-07-14 RX ADMIN — HYDROMORPHONE HYDROCHLORIDE 0.5 MG: 1 INJECTION, SOLUTION INTRAMUSCULAR; INTRAVENOUS; SUBCUTANEOUS at 13:03

## 2021-07-14 RX ADMIN — METHYLPREDNISOLONE SODIUM SUCCINATE 30 MG: 40 INJECTION, POWDER, FOR SOLUTION INTRAMUSCULAR; INTRAVENOUS at 08:38

## 2021-07-14 RX ADMIN — HYDROMORPHONE HYDROCHLORIDE 0.5 MG: 1 INJECTION, SOLUTION INTRAMUSCULAR; INTRAVENOUS; SUBCUTANEOUS at 08:51

## 2021-07-14 ASSESSMENT — PAIN DESCRIPTION - PROGRESSION
CLINICAL_PROGRESSION: GRADUALLY WORSENING
CLINICAL_PROGRESSION: NOT CHANGED
CLINICAL_PROGRESSION: GRADUALLY WORSENING

## 2021-07-14 ASSESSMENT — PAIN DESCRIPTION - PAIN TYPE
TYPE: SURGICAL PAIN
TYPE: SURGICAL PAIN

## 2021-07-14 ASSESSMENT — PAIN - FUNCTIONAL ASSESSMENT
PAIN_FUNCTIONAL_ASSESSMENT: PREVENTS OR INTERFERES SOME ACTIVE ACTIVITIES AND ADLS
PAIN_FUNCTIONAL_ASSESSMENT: PREVENTS OR INTERFERES WITH MANY ACTIVE NOT PASSIVE ACTIVITIES

## 2021-07-14 ASSESSMENT — PAIN SCALES - GENERAL
PAINLEVEL_OUTOF10: 5
PAINLEVEL_OUTOF10: 8
PAINLEVEL_OUTOF10: 7
PAINLEVEL_OUTOF10: 4
PAINLEVEL_OUTOF10: 7
PAINLEVEL_OUTOF10: 7
PAINLEVEL_OUTOF10: 8
PAINLEVEL_OUTOF10: 5
PAINLEVEL_OUTOF10: 7
PAINLEVEL_OUTOF10: 8

## 2021-07-14 ASSESSMENT — PAIN DESCRIPTION - ONSET
ONSET: ON-GOING
ONSET: ON-GOING

## 2021-07-14 ASSESSMENT — PAIN DESCRIPTION - FREQUENCY
FREQUENCY: CONTINUOUS
FREQUENCY: CONTINUOUS

## 2021-07-14 ASSESSMENT — PAIN DESCRIPTION - DESCRIPTORS
DESCRIPTORS: SHARP
DESCRIPTORS: SHARP;BURNING

## 2021-07-14 ASSESSMENT — PAIN DESCRIPTION - LOCATION
LOCATION: ABDOMEN
LOCATION: ABDOMEN

## 2021-07-14 ASSESSMENT — PAIN DESCRIPTION - ORIENTATION
ORIENTATION: MID
ORIENTATION: MID

## 2021-07-14 NOTE — PROGRESS NOTES
Hospitalist Progress Note    Patient:  Vince Urbano      Unit/Bed:5K-11/011-A    YOB: 1990    MRN: [id number]       Acct: [de-identified]     PCP: Natasha Cazares MD    Date of Admission: 7/9/2021    Assessment/Plan:    1. Crohn's disease with acute exacerbation--per GI; she is currently on Solu-Medrol 30 mg daily; Humira on hold since April; needs a 3-week follow-up with Dr. Sarah Marr; left a perfect serve message with [facility] from GI regarding dosing of the steroids  2. Large intra-abdominal abscess status post laparotomy, washout and drain placement on 7/13/2021--per surgery; Levaquin 7/12; Flagyl 7/9; on PPN and tolerating; appreciate ID input  3. Bilateral pleural effusions  4. Chronic normocytic anemia  5. Severe malnutrition--per dietitian        Expected discharge date: Pending clinical course    Disposition:    [x] Home       [] TCU       [] Rehab       [] Psych       [] SNF       [] [place]       [] Other-    Chief Complaint: Abdominal pain    Hospital Course: Per initial H&P: \"The patient is a 33 y. o. female who presented to [address] [place] [address] with abd pain. Patient was just discharged from our service last week. She had acute Crohn's exacerbation. She was discharged on 7/4/21 on 20 mg BID  PO Prednison. Patient was supposed to start on Humira yesterday. Patient reported worsening abdominal pain on 7/9/21 early AM. She presented to ED where CT showed large perihepatic and smaller pelvic fluid collections.  S/P 2 drains placement by IR\"    7/14--> had exploratory laparotomy along with washout and drain placement on 7/13; hemodynamically stable     Subjective (past 24 hours): Sitting up in bed,  at bedside, encouraged to exercise those lungs, states she feels a lot better    Medications:  Reviewed    Infusion Medications    sodium chloride 30 mL/hr at 07/13/21 1753    PN-Adult  3 IN 1 Central Line (Custom) 75 mL/hr at 07/13/21 1754    sodium chloride Scheduled Medications    sodium chloride flush  5-40 mL Intravenous 2 times per day    enoxaparin  40 mg Subcutaneous Daily    levofloxacin  750 mg Intravenous Q24H    insulin lispro  0-12 Units Subcutaneous Q6H    methylPREDNISolone  30 mg Intravenous Daily    metroNIDAZOLE  500 mg Intravenous Q8H    famotidine (PEPCID) injection  20 mg Intravenous BID     PRN Meds: sodium chloride flush, sodium chloride, ondansetron **OR** ondansetron, HYDROcodone 5 mg - acetaminophen **OR** HYDROcodone 5 mg - acetaminophen, ondansetron **OR** ondansetron, morphine **OR** morphine      Intake/Output Summary (Last 24 hours) at 7/14/2021 0829  Last data filed at 7/14/2021 0447  Gross per 24 hour   Intake 3121 ml   Output 3155 ml   Net -34 ml       Diet:  PN-Adult  3 IN 1 Central Line (Custom)  ADULT DIET; Clear Liquid    Exam:  /64   Pulse 76   Temp 98.2 °F (36.8 °C) (Oral)   Resp 18   Ht 4' 11\" (1.499 m)   Wt 90 lb (40.8 kg)   LMP 07/09/2021   SpO2 95%   BMI 18.18 kg/m²     General appearance: No apparent distress, appears stated age and cooperative. HEENT: Pupils equal, round, and reactive to light. Conjunctivae/corneas clear. Neck: Supple, with full range of motion. No jugular venous distention. Trachea midline. Respiratory:  Normal respiratory effort. Clear to auscultation, bilaterally without Rales/Wheezes/Rhonchi. Cardiovascular: Regular rate and rhythm with normal S1/S2 without murmurs, rubs or gallops. Abdomen: Surgical site and generalized tenderness; drains noted  Musculoskeletal: passive and active ROM x 4 extremities. Skin: Skin color, texture, turgor normal.    Neurologic:  Neurovascularly intact without any focal sensory/motor deficits.  Cranial nerves: II-XII intact, grossly non-focal.  Psychiatric: Alert and oriented, thought content appropriate  Capillary Refill: Brisk,< 3 seconds   Peripheral Pulses: +2 palpable, equal bilaterally       Labs:   Recent Labs     07/12/21  0604 07/13/21  0752 07/14/21  0434   WBC 23.5* 11.2* 14.0*   HGB 10.4* 11.0* 11.1*   HCT 34.5* 36.7* 37.2    256 278     Recent Labs     07/12/21  0604 07/12/21  0604 07/13/21  0752 07/14/21  0434     --  137 141   K 3.6   < > 3.8 3.8  3.8     --  101 105   CO2 24  --  29 28   BUN 14  --  12 10   CREATININE 0.5  --  0.4 0.3*   CALCIUM 8.2*  --  8.3* 8.1*   PHOS 3.7  --  3.0 3.2    < > = values in this interval not displayed. Microbiology:    Anaerobic and aerobic culture growing strep anginosus, Streptococcus conststellatus; strep mitis and oralis    Urinalysis:      Lab Results   Component Value Date    NITRU NEGATIVE 07/09/2021    WBCUA 5-9 07/09/2021    BACTERIA FEW 07/09/2021    RBCUA 3-5 07/09/2021    BLOODU LARGE 07/09/2021    GLUCOSEU NEGATIVE 07/09/2021       Radiology:  XR ACUTE ABD SERIES CHEST 1 VW    Result Date: 7/9/2021  PROCEDURE: XR ACUTE ABD SERIES CHEST 1 VW CLINICAL INFORMATION: severe pain COMPARISON: No prior study. TECHNIQUE: PA chest, AP supine abdomen and AP erect abdomen performed. FINDINGS: CHEST: POSTOPERATIVE CHANGES: None. LINES/TUBES/MECHANICAL DEVICES: None. TRACHEA/CARDIOMEDIASTINAL SILHOUETTE: Normal. LUNG FIELDS: Small right effusion at right base opacity. Interstitial and peribronchial thickening in the lower lobes. PNEUMOTHORAX: None. OSSEOUS STRUCTURES: 1.  No acute osseous abnormality. OTHER: None. ABDOMEN: POSTOPERATIVE CHANGES: None. LINES/TUBES/MECHANICAL DEVICES: None. BOWEL GAS PATTERN: 1. Except for air in the stomach, the bowel is gasless. FREE AIR: None. MASS SHADOWS: The liver appears large. PATHOLOGIC CALCIFICATIONS: None. OSSEOUS STRUCTURES: 1. Levocurvature of the lumbar spine. OTHER: None. 1. Small right effusion with right base consolidation. . 2. Unremarkable supine and upright views of the abdomen. 3. Hepatomegaly. **This report has been created using voice recognition software.   It may contain minor errors which are inherent in voice recognition technology. ** Final report electronically signed by Dr. Lizet Oswald on 7/9/2021 9:26 AM    CT GUIDED NEEDLE PLACEMENT    Result Date: 7/9/2021  CT-GUIDED ABSCESS DRAINAGE PERFORMED BY: Cristian Zhang. Mirna Sal M.D. [facility] Aleyda: Fluid collections throughout the abdomen. One is within the right side and one is within the left side of the abdomen. APPROACH: Anterior right side of the abdomen. Anterior left side of the abdomen. CATHETER: 214 Singaporean multipurpose drainage catheters. FLUID OBTAINED: Yellow-colored fluid on the right and reddish colored fluid on the left was aspirated from the abdomen. Specimens were sent to the laboratory for further analysis. SEDATION: Versed 1.5 mg and fentanyl 75 mcg , IV; the patient was sedated for 29 minutes during this procedure and monitored with EKG and pulse ox monitoring devices by a registered nurse. Face-to-face time with the patient was 29 minutes. PROCEDURE: Signed informed consent was obtained prior to performing this procedure. The patient was placed on the CT scanner and sedated, as indicated above. ALL CT SCANS AT THIS FACILITY use dose modulation, iterative reconstruction, and/or weight-based dosing when appropriate to reduce radiation dose to as low as reasonably achievable. CT images were initially obtained to determine appropriate puncture sites. The skin was marked, prepped, and draped in a sterile fashion. Following local anesthesia and utilizing aseptic technique, needles were successfully passed into the fluid collections. A small amount of fluid was aspirated to confirm appropriate needle position. A guidewire was then passed through the needle followed by insertion of progressively larger dilators up to an port Western Melanie size. An 15 Singaporean multi-side-hole drainage catheter was then passed over the wire and was coiled within the abscess pocket. The retaining suture was then locked in the standard fashion.  Catheter was then hooked to a Prabhu-Close drainage bag and the catheter was flushed several times with sterile saline. The catheter was stabilized to the skin with a Percu-Stay device. A sample of the fluid was sent to laboratory for culture and sensitivity testing. Successful, uncomplicated placement of 14 [place] drainage catheters into both sides of the abdomen. **This report has been created using voice recognition software. It may contain minor errors which are inherent in voice recognition technology. ** Final report electronically signed by Dr Jan Nguyen on 7/9/2021 3:38 PM    CT GUIDED NEEDLE PLACEMENT    Result Date: 7/9/2021  CT-GUIDED ABSCESS DRAINAGE PERFORMED BY: Ronney Goodpasture. Gary Paez M.D. Aparna Moreno: Fluid collections throughout the abdomen. One is within the right side and one is within the left side of the abdomen. APPROACH: Anterior right side of the abdomen. Anterior left side of the abdomen. CATHETER: 214 P[place] multipurpose drainage catheters. FLUID OBTAINED: Yellow-colored fluid on the right and reddish colored fluid on the left was aspirated from the abdomen. Specimens were sent to the laboratory for further analysis. SEDATION: Versed 1.5 mg and fentanyl 75 mcg , IV; the patient was sedated for 29 minutes during this procedure and monitored with EKG and pulse ox monitoring devices by a registered nurse. Face-to-face time with the patient was 29 minutes. PROCEDURE: Signed informed consent was obtained prior to performing this procedure. The patient was placed on the CT scanner and sedated, as indicated above. ALL CT SCANS AT THIS FACILITY use dose modulation, iterative reconstruction, and/or weight-based dosing when appropriate to reduce radiation dose to as low as reasonably achievable. CT images were initially obtained to determine appropriate puncture sites. The skin was marked, prepped, and draped in a sterile fashion.  Following local anesthesia and utilizing aseptic technique, needles were successfully passed into the fluid collections. A small amount of fluid was aspirated to confirm appropriate needle position. A guidewire was then passed through the needle followed by insertion of progressively larger dilators up to an port Western Melanie size. An 15 Icelandic multi-side-hole drainage catheter was then passed over the wire and was coiled within the abscess pocket. The retaining suture was then locked in the standard fashion. Catheter was then hooked to a Prabhu-Close drainage bag and the catheter was flushed several times with sterile saline. The catheter was stabilized to the skin with a Percu-Stay device. A sample of the fluid was sent to laboratory for culture and sensitivity testing. Successful, uncomplicated placement of 14 Icelandic drainage catheters into both sides of the abdomen. **This report has been created using voice recognition software. It may contain minor errors which are inherent in voice recognition technology. ** Final report electronically signed by Dr Florence Hawk on 7/9/2021 3:38 PM    CT ABDOMEN PELVIS W IV CONTRAST Additional Contrast? None    Result Date: 7/9/2021  PROCEDURE: CT ABDOMEN PELVIS W IV CONTRAST CLINICAL INFORMATION: diffuse abdominal pain COMPARISON: CT abdomen and pelvis June 29, 2021 TECHNIQUE: 5 mm axial imaging through the abdomen and pelvis with IV contrast.  Coronal and sagittal reconstructions were performed. All CT scans at this facility use dose modulation, iterative reconstruction, and/or weight based dosing when appropriate to reduce the radiation dose to as low as reasonably achievable. CONTRAST: type and amount FINDINGS: LUNG BASES: Moderate bilateral pleural effusions are seen with adjacent atelectasis. LIVER/GALLBLADDER/BILIARY TREE: The liver measures 19.7 cm in craniocaudad dimension. There is redemonstration of a subcentimeter low-density focus in the lateral aspect of the posterior segment of the right lobe of the liver.  The gallbladder is unremarkable except for prior cholecystectomy with elevated gallbladder pathology. PANCREAS/SPLEEN: Unremarkable. ADRENAL GLANDS: Unremarkable. KIDNEYS: Wedge-shaped bands of hypoenhancement are seen in both kidney, not present previously, appearance consistent with pyelonephritis. The kidneys overall do not appear particularly swollen at this time. . BOWEL: 1. Nonobstructive. There is diffuse thickening of the ileal loops, extending significantly more than previous, but the degree of thickness is not as severe as previous, particularly the terminal ileum. The colon is collapsed. The walls of the ascending colon and transverse colon appear inflamed and mildly thickened. Thickening of the walls of duodenal is also seen, with enhancement, also changed from previous. FREE AIR/FREE FLUID/INFLAMMATION: Large perihepatic collection with trapped air bubbles seen extending from dome of the liver through to the pelvis, measures approximately 4.5 cm in its largest AP dimension. A second collection is seen in the left lower quadrant, measures approximately 4.7 cm in largest AP dimension, approximately 13.5 cm in craniocaudad dimension and connects across the lower abdomen upper pelvis to the perihepatic collection (coronal images 22-27). Smaller collections are seen in the pelvis centered at the periphery in a U shaped configuration, best demonstrated in the coronal plane and does not appear communicate with the larger, aforementioned collection. There is fluid throughout the mesentery. LYMPHADENOPATHY: 1. No pathologically enlarged lymph nodes. ABDOMINAL AORTA: Unremarkable. PELVIS: 1. U-shaped abscess centered around the periphery. Mesenteric inflammation. . ABDOMINAL WALL: Diffuse abdominal wall edema, with fluid density in the fascial planes, extending through to the imaged thigh. MUSCULOSKELETAL: Unremarkable lumbar spine and joints of the pelvis. No sign of infection. Levo curve of the lumbar spine. OTHER: None.      1.  Interval development of large perihepatic collection with trapped air bubbles extending from dome of the liver through to the lower abdomen/upper pelvis, measures approximately 4.5 cm in its largest AP dimension. A second collection is seen in the left lower quadrant, measures approximately 4.7 cm in largest AP dimension, approximately 13.5 cm in craniocaudad dimension and connects across the lower abdomen upper pelvis to the perihepatic collection, consistent with abscesses. Smaller collections are seen in the pelvis centered at the periphery in a U shaped configuration, best demonstrated in the coronal plane and does not appear to communicate with the larger, aforementioned collection. 2.  Mesenteric induration and free fluid. 3.  .Moderate bilateral pleural effusions with adjacent atelectasis. **This report has been created using voice recognition software. It may contain minor errors which are inherent in voice recognition technology. ** Final report electronically signed by Dr. Lizet Oswald on 7/9/2021 10:53 AM    CT ABSCESS DRAINAGE SOFT TISSUE    Result Date: 7/9/2021  CT-GUIDED ABSCESS DRAINAGE PERFORMED BY: Cristian Zhang. Mirna Sal M.D. [facility] Aleyda: Fluid collections throughout the abdomen. One is within the right side and one is within the left side of the abdomen. APPROACH: Anterior right side of the abdomen. Anterior left side of the abdomen. CATHETER: 214 Albanian multipurpose drainage catheters. FLUID OBTAINED: Yellow-colored fluid on the right and reddish colored fluid on the left was aspirated from the abdomen. Specimens were sent to the laboratory for further analysis. SEDATION: Versed 1.5 mg and fentanyl 75 mcg , IV; the patient was sedated for 29 minutes during this procedure and monitored with EKG and pulse ox monitoring devices by a registered nurse. Face-to-face time with the patient was 29 minutes. PROCEDURE: Signed informed consent was obtained prior to performing this procedure.  The patient was placed on the CT scanner and sedated, as indicated above. ALL CT SCANS AT THIS FACILITY use dose modulation, iterative reconstruction, and/or weight-based dosing when appropriate to reduce radiation dose to as low as reasonably achievable. CT images were initially obtained to determine appropriate puncture sites. The skin was marked, prepped, and draped in a sterile fashion. Following local anesthesia and utilizing aseptic technique, needles were successfully passed into the fluid collections. A small amount of fluid was aspirated to confirm appropriate needle position. A guidewire was then passed through the needle followed by insertion of progressively larger dilators up to an port Western Melanie size. An 15 Zambian multi-side-hole drainage catheter was then passed over the wire and was coiled within the abscess pocket. The retaining suture was then locked in the standard fashion. Catheter was then hooked to a Prabhu-Close drainage bag and the catheter was flushed several times with sterile saline. The catheter was stabilized to the skin with a Percu-Stay device. A sample of the fluid was sent to laboratory for culture and sensitivity testing. Successful, uncomplicated placement of 14 Zambian drainage catheters into both sides of the abdomen. **This report has been created using voice recognition software. It may contain minor errors which are inherent in voice recognition technology. ** Final report electronically signed by Dr Na Brooks on 7/9/2021 4:18 PM    CT ABSCESS DRAINAGE SOFT TISSUE    Result Date: 7/9/2021  CT-GUIDED ABSCESS DRAINAGE PERFORMED BY: Aayush Zhu. STERLING Hameed Oms: Fluid collections throughout the abdomen. One is within the right side and one is within the left side of the abdomen. APPROACH: Anterior right side of the abdomen. Anterior left side of the abdomen. CATHETER: 214 Zambian multipurpose drainage catheters.  FLUID OBTAINED: Yellow-colored fluid on the right and reddish colored fluid on the left was aspirated from the abdomen. Specimens were sent to the laboratory for further analysis. SEDATION: Versed 1.5 mg and fentanyl 75 mcg , IV; the patient was sedated for 29 minutes during this procedure and monitored with EKG and pulse ox monitoring devices by a registered nurse. Face-to-face time with the patient was 29 minutes. PROCEDURE: Signed informed consent was obtained prior to performing this procedure. The patient was placed on the CT scanner and sedated, as indicated above. ALL CT SCANS AT THIS FACILITY use dose modulation, iterative reconstruction, and/or weight-based dosing when appropriate to reduce radiation dose to as low as reasonably achievable. CT images were initially obtained to determine appropriate puncture sites. The skin was marked, prepped, and draped in a sterile fashion. Following local anesthesia and utilizing aseptic technique, needles were successfully passed into the fluid collections. A small amount of fluid was aspirated to confirm appropriate needle position. A guidewire was then passed through the needle followed by insertion of progressively larger dilators up to an port Western Melanie size. An 15 Kyrgyz multi-side-hole drainage catheter was then passed over the wire and was coiled within the abscess pocket. The retaining suture was then locked in the standard fashion. Catheter was then hooked to a Prabhu-Close drainage bag and the catheter was flushed several times with sterile saline. The catheter was stabilized to the skin with a Percu-Stay device. A sample of the fluid was sent to laboratory for culture and sensitivity testing. Successful, uncomplicated placement of 14 Kyrgyz drainage catheters into both sides of the abdomen. **This report has been created using voice recognition software. It may contain minor errors which are inherent in voice recognition technology. ** Final report electronically signed by Dr Chris Adams on 7/9/2021 4:18 PM      DVT prophylaxis: [x] Lovenox [] SCDs                                 [] SQ Heparin                                 [] Encourage ambulation           [] Already on Anticoagulation     Code Status: Full Code    Tele:   [] yes             [x] no    Active Hospital Problems    Diagnosis Date Noted    Severe malnutrition ([facility] [E43] 07/13/2021     Class: Acute    Crohn's disease of small intestine with abscess ([facility] [K50.014]     Intra-abdominal abscess ([facility] [K65.1] 07/09/2021       Electronically signed by PRAVEENA Black CNP on 7/14/2021 at 8:29 AM

## 2021-07-14 NOTE — PLAN OF CARE
Problem: Nutrition  Goal: Optimal nutrition therapy  Outcome: Ongoing   Nutrition Problem #1: Severe malnutrition, In context of acute illness or injury  Intervention: Food and/or Nutrient Delivery: Modify Parenteral Nutrition (diet as per Surgeon)  Nutritional Goals: Pt will tolerate adequate nutrition support to meet 75% or more of estimated nutritional needs during LOS until able to transition to po feeds.

## 2021-07-14 NOTE — PROGRESS NOTES
Elin Barry   Daily Progress Note    Pt Name: Benton Stone Herreid Record Number: [id number]  Date of Birth 1990   Today's Date: 7/14/2021    Hospital day # 5     ASSESSMENT   1. Large intra-abdominal abscess secondary to #6 - POD # 1 Status post exploratory lapartomy with washout and JAYMIE drain placement   2. Bilateral pleural effusions  3. Leukocytosis  4. Thrombocytosis  5. Elevated liver enzymes  6. Crohn's disease   has a past medical history of Crohn's colitis ([facility] [address] PLAN   1. Clear liquids. On PPN. 2. Remove luu  3. IV antibiotics. ID following along. 4. Pain and nausea control  5. Up with assistance  6. Wound & JAYMIE drain management - both drains more serous today  7. Holding prednisone and Humira. GI following. 8. SCDs for DVT prophylaxis. Refusing Lovenox. 9. Medicine managing the weaning of steroids. Would like patient to be tapered off as soon as possible with surgery. 10. Trend labs  11. Looks pretty good today. Encouragement given. SUBJECTIVE   Chief complaint: Abdominal pain - surgical    Chart reviewed. Stable overnight. Patient has abdominal pain but it is different than before surgery. Abdomen is much less distended already. Generalized tenderness. JAYMIE drains are in place and both more serous. Luu in place. No nausea or vomiting. Denies chest pain or shortness of breath. No lightheadedness or dizziness. No flatus. Abdominal dressings are dry and intact.    CURRENT MEDICATIONS   Scheduled Meds:   sodium chloride flush  5-40 mL Intravenous 2 times per day    enoxaparin  40 mg Subcutaneous Daily    levofloxacin  750 mg Intravenous Q24H    insulin lispro  0-12 Units Subcutaneous Q6H    methylPREDNISolone  30 mg Intravenous Daily    metroNIDAZOLE  500 mg Intravenous Q8H    famotidine (PEPCID) injection  20 mg Intravenous BID     Continuous Infusions:   sodium chloride 30 mL/hr at 07/13/21 1753    PN-Adult  3 IN 1 [place]

## 2021-07-14 NOTE — PROGRESS NOTES
Physician Progress Note      PATIENT:               Matilda Campos  CSN #:                  [id number]  :                       1990  ADMIT DATE:       2021 8:17 AM  DISCH DATE:  RESPONDING  PROVIDER #:        Norah Reyes MD          QUERY TEXT:    Pt admitted with Large intraabdominal abscesses. Noted documentation of   Severe Malnutrition by Dietary . If possible, please document in progress   notes and discharge summary:      The medical record reflects the following:  Risk Factors: Crohns dx  Clinical Indicators: Meets AND/ASPEN guidelines Findings of the 6 clinical   characteristics of malnutrition:  Energy Intake:  7 - 50% or less of estimated energy requirements for 5 or more   days  Weight Loss:  No significant weight loss  Body Fat Loss:  7 - Moderate body fat loss Orbital  Muscle Mass Loss:  7 - Moderate muscle mass loss Temples (temporalis)  Fluid Accumulation:  No significant fluid accumulation Extremities  Treatment: Dietary consult, PPN    Thank You! Reba Odraz RN  RN Clinical   (N) [phone] (h) [phone]  Options provided:  -- Severe Malnutrition confirmed present on admission  -- Severe Malnutrition ruled out  -- Other - I will add my own diagnosis  -- Disagree - Not applicable / Not valid  -- Disagree - Clinically unable to determine / Unknown  -- Refer to Clinical Documentation Reviewer    PROVIDER RESPONSE TEXT:    The diagnosis of Severe Malnutrition was confirmed as present on admission.     Query created by: Jessica Greenwood on 2021 11:40 AM      Electronically signed by:  Norah Reyes MD 2021 5:26 PM

## 2021-07-14 NOTE — CARE COORDINATION
7/14/21, 3:55 PM EDT    DISCHARGE ON GOING [address] day: 5  Location: -11/011-A Reason for admit: Intra-abdominal abscess [facility]) [K65.1]   Procedure: 7/14/2021 Exploratory laparotomy with washout and drain  placement x2. Barriers to Discharge: Hospitalist following, GI has signed off, ID consult, TPN per pharmacy dosing, Dietician, Lovenox, IV Pepcid, ISS, IV Levaquin, Solumedrol, IV Flagyl, prn medications, daily labs, clear liquid diet, abdominal binder, ambulate, wound and drain care, incentive spirometry, SCD's, up as tolerated with assistance. PCP: Sumeet Landin MD  Readmission Risk Score: 14%  Patient Goals/Plan/Treatment Preferences: Adan Schaefer is from home with her . Monitoring for needs closer to discharge.

## 2021-07-14 NOTE — FLOWSHEET NOTE
[facility]. [facility] [place] [facility] PROGRESS NOTE      Patient: Jacobo Bains  Room #: 5K-11/011-A            YOB: 1990  Age: 31 y.o. Gender: female            Admit Date & Time: 7/9/2021  8:17 AM    Assessment:  Jayy Maria is a 31year old female who is in bed on 5k. Her  is in the room and is her support. We talked about her recent surgery and her medical condition as she is recovering. Interventions:  She welcomed this  in her room and was Tonie Bleacher with prayer for her healing. Outcomes: Thankful    Plan: 1. Care Plan:  Continue spiritual and emotional care for patient and family. Including prayers.      Electronically signed by Kelsea Lorenzo on 7/14/2021 at 1:10 PM.  [address] [place] [address]  [phone]

## 2021-07-14 NOTE — PLAN OF CARE
Problem: Pain:  Goal: Pain level will decrease  Description: Pain level will decrease  Outcome: Ongoing  Note: Pain assessment complete. PRN pain medications given per physician orders. Non pharmacological pain intervention given rest and reposition. Will continue to monitor       Problem: Infection - Surgical Site:  Goal: Will show no infection signs and symptoms  Description: Will show no infection signs and symptoms  Outcome: Ongoing  Note: No s/s of infection. Will continue to monitor. Care plan reviewed with patient. Patient verbalize understanding of the plan of care and contribute to goal setting.

## 2021-07-14 NOTE — PLAN OF CARE
Operative note reviewed. Exploratory laparotomy with washout & drain placement x 2 done 07/13/21 by Dr. Deb Black. She is on ATBs per surgery. IM managing taper of steroids, she is currently on Solumedrol IV 30mg daily. Case discussed with Charles GRISSOM. General surgery will let Dr. Beverly Benjamin know when they are okay with resuming Humira. Humira currently on hold, keep in mind patient has not received a dose of Humira since April, has moderate to severe active colitis secondary to Crohn's not treated since April. RN updated.     - Will need a 3 week follow-up with Dr. Emani Mckeon signing off

## 2021-07-14 NOTE — OP NOTE
questions were answered in  their entirety and the patient was completely aware of the current  situation, she and her family wished to proceed with surgery. DESCRIPTION OF THE PROCEDURE:  After informed consent was signed and  placed on the chart, the patient was taken back to the operating room  and placed supine on the operating room table. General anesthesia was  induced. She tolerated this well throughout the case. All pressure  points were padded. She was on preoperative antibiotics. Bilateral  lower extremity sequential compression devices were placed prior to  incision. Her abdomen and pelvis were prepped and draped in usual  sterile standard fashion. A timeout occurred prior to the operation  which not only identified the patient but also the planned procedure to  be performed. At the end of the timeout, there were no questions or  concerns. I began the operation by making a vertical midline incision  with a 10-blade scalpel. Deepened through the deep dermal and  subcutaneous tissues. Linea alba was sharply dissected through and the  intraabdominal cavity was entered into. A large amount of purulence/pus  was then extracted of about 500 mL. It was clear that the field was  already contaminated. There was diffuse infection and inflammation  throughout the abdomen. Eventually, I was able to completely free up  the small bowel from the ligament of Treitz distally. No bowel injury  was identified. Omentum was able to be freed up which was mainly down  in the right lower quadrant. Pelvis was able to be freed up as well. Irrigation with Irrisept. Eventually, all of the purulence and pus were  extracted in its entirety and the abdomen overall looked clean even  though it was still very friable. Reevaluation of the abdomen never did  demonstrate any kind of obvious rupture.   There was a lot of film on the  bowel especially down in the ileum and the terminal ileal region and  this was not attempted to be taken off as this was going to rip the  bowel in of itself. Most likely, the small perforation that had  occurred originally was already sealed over. Irrisept was then placed  again after hemostasis was assured and then wound closure table was then  brought out. Re-prep and draping and new instruments were brought up as  well. #19 round Jose drains were placed through the already made stab  incisions from the old percutaneous drains placed by Interventional  Radiology. These drains had already been removed. The right drain was  placed down into the pelvis wearing up over the left lateral paracolic  region. The left drain was actually placed down in the pelvis as well  and then back up along the right paracolic gutter. Both of these were  sutured with silk sutures x2. After all the irrigation was removed,  hemostasis assured, we then closed the fascia with a running #1  Stratafix x2. These were tied. Care was taken to avoid bowel injury. Subcutaneous tissues were irrigated with Irrisept. Skin reapproximated  with a running 3-0 Vicryl in subcuticular fashion. Less than 50 mL  blood loss. Tolerated the procedure well. Was easily brought out of  general anesthesia and transferred to the postanesthesia care unit in  stable condition.         Ludy Jacobs M.D.    D: 07/13/2021 15:52:56       T: 07/14/2021 0:30:28     PRATIBHA/[email or web address]_KAIN  Job#: [id number]     Doc#: [id number]    CC:

## 2021-07-14 NOTE — PROGRESS NOTES
TPN Follow Up Note    Assessment: CLD started today, PPN continues  Electrolyte Replacement: none    TPN changes for (today) at 1800:    Increase macronutrients per dietician recommendations to 1.2 g/kg protein, 20% lipids, 12 kcal/kg total using dosing weight of 43 kg  Increase calcium to 4.65 mEq    Re-check BMP, Mg, PO4, iCa 7/15    Olga Torres PharmD, BCPS, BCCCP  7/14/2021 10:26 AM

## 2021-07-14 NOTE — PROGRESS NOTES
Comprehensive Nutrition Assessment    Type and Reason for Visit:  Reassess (PPN macronutrients)    Nutrition Recommendations/Plan:   Recommend increase PPN kcals to 12 kcal/kgm, increase protein to 1.2 grams protein/kgm, 20% lipid kcals with dose weight 43kgm. Diet progression as per Doctor. Nutrition Assessment:    Pt. With slight improvement from nutritional standpoint AEB PPN infusing and clear liquid diet initiation. At risk for further nutritional compromise r/t severe malnutrition, admit d/t intra-abdominal abscess s/p percutaneous drain placement x2 and bilateral pleural effusions; 7/13/21: exploratory laparotomy with washout and drain placement; Clear liquid diet at present with need for PPN to meet estimated nutritional needs and underlying medical condition (hx Crohn's disease). Nutrition recommendations/interventions as per above. Malnutrition Assessment:  Malnutrition Status:  Severe malnutrition    Context:  Acute Illness     Findings of the 6 clinical characteristics of malnutrition:  Energy Intake:  7 - 50% or less of estimated energy requirements for 5 or more days  Weight Loss:  No significant weight loss     Body Fat Loss:  7 - Moderate body fat loss Orbital   Muscle Mass Loss:  7 - Moderate muscle mass loss Temples (temporalis)  Fluid Accumulation:  No significant fluid accumulation Extremities   Strength:  Not Performed    Estimated Daily Nutrient Needs:  Energy (kcal):  ~1290 kcal/day (~30 kcal/kg); Weight Used for Energy Requirements:   (43 kg)     Protein (g):  52-65 g/day (1.2-1.5g/kg); Weight Used for Protein Requirements:   (43 kg)        Fluid (ml/day):  1290+ mL/day (~30+ mL/kg); Method Used for Fluid Requirements:  1 ml/kcal      Nutrition Related Findings:  Admit due to intra- abdominal abscess s/p percutaneous drain placement x 2 and bilateral pleural effusions, 7/13/21: exploratory laparotomy with washout and drain placementx 2.  Patient seen- PPN infusing at 75ml/ hour, starting clear liquid diet this morning, reports ate small parts of jello, broth and icee then began to feel \"bubbly\" and stopped intake. No plan for PICC at this time, spoke with Pharmacist Evelio Roe regarding PPN macronutrients. Medication includes pepcid, humalog, levaquin, solumedrol, flagyl; potassium 3.8, BUN 10, Creatinine 0.3, glucose 116, phosphorus 3.2; last BM x 1 on 7/9; report poor appetite consuming less than 50% of meals over past week, states was following low fiber diet and just snacked occasionally throughout the day      Wounds:  Surgical Incision (7/13/21: abdomen incision: exploratory laparotomy with washout and drain placement)       Current Nutrition Therapies:    PN-Adult  3 IN 1 Central Line (Custom)  ADULT DIET; Clear Liquid  Current Parenteral Nutrition Orders:  · Type and Formula: 3-in-1 Peripheral (10 kcal/kgm, 1 gram protein, 20% lipids with dose weight 43kgm)   · Lipids: Daily  · Duration: Continuous  · Rate/Volume: 75ml/ hour  · Current PN Order Provides: 430 kcals, 43 grams protein, 50 grams CHO, 9 grams fat/ 24 hours  · Goal PN Orders Provides: Next bag increase to 12 kcal/kgm, 1.2 grams protein/kgm, 20% lipids with dose weight 43kgm to provide 516 kcals, 52 grams protein, 61 grams CHO, 10 grams fat/ 24 hours      Anthropometric Measures:  · Height: 4' 11\" (149.9 cm)  · Current Body Weight: 95 lb 8 oz (43.3 kg) (7/13; bedscale; no edema noted)   · Admission Body Weight: 95 lb 8 oz (43.3 kg) (7/13; bedscale; no edema noted)    · Usual Body Weight:  (Per EMR: 94 lb 3.2 oz on 6/29/21)     · Ideal Body Weight: 95 lbs;   · BMI: 19.3  · BMI Categories: Normal Weight (BMI 18.5-24. 9)       Nutrition Diagnosis:   · Severe malnutrition, In context of acute illness or injury related to inadequate protein-energy intake as evidenced by poor intake prior to admission, moderate loss of subcutaneous fat, moderate muscle loss      Nutrition Interventions:   Food and/or Nutrient Delivery: Modify Parenteral Nutrition (diet as per Surgeon)  Nutrition Education/Counseling:  Education initiated   Coordination of Nutrition Care:  Continue to monitor while inpatient    Goals:  Pt will tolerate adequate nutrition support to meet 75% or more of estimated nutritional needs during LOS until able to transition to po feeds. Nutrition Monitoring and Evaluation:   Behavioral-Environmental Outcomes:  None Identified   Food/Nutrient Intake Outcomes:  Parenteral Nutrition Intake/Tolerance  Physical Signs/Symptoms Outcomes:  Biochemical Data, Constipation, GI Status, Weight, Skin, Nutrition Focused Physical Findings     Discharge Planning:     Too soon to determine     Electronically signed by Eriberto Lang RD, LD on 7/14/21 at 10:13 AM EDT    Contact: [phone]

## 2021-07-14 NOTE — PLAN OF CARE
Problem: Pain:  Goal: Pain level will decrease  Description: Pain level will decrease  7/15/2021 1101 by Jovanni Rizzo RN  Outcome: Ongoing  Note: PRN pain medication on board. Nurse educating on use of nonpharmacologic measures. Abdominal binder used with ambulation and pillow used for splinting when in bed. Patient rates pain 8 on 1-10 scale at surgical site. Problem: Infection - Surgical Site:  Goal: Will show no infection signs and symptoms  Description: Will show no infection signs and symptoms  7/15/2021 1101 by Jovanni Rizzo RN  Outcome: Ongoing  Note: WBC 16.1 on 7/15. IV antibiotic therapy on board. Vital signs WNL. Problem: Pain Control  Goal: Maintain pain level at or below patient's acceptable level (or 5 if patient is unable to determine acceptable level)  Outcome: Ongoing  Flowsheets (Taken 7/15/2021 0876)  Patient's Stated Pain Goal: 3  Note: Pain goal met during assessment. Will continue to use pharmacologic and nonpharmacologic interventions. Problem: GI  Goal: No bowel complications  Outcome: Ongoing  Note: Hypoactive bowel sounds in all 4 quadrants. Patient denies passing of gas. Patient educated on ambulation to promote bowel movement. Full liquid added with no nausea present. Problem:   Goal: Adequate urinary output  Outcome: Ongoing  Note: Straw color urine present via luu with adequate output. Problem: Nutrition  Goal: Optimal nutrition therapy  7/15/2021 1101 by Jovanni Rizzo RN  Outcome: Ongoing  Note: Patient able to tolerate full liquids. Able to intake 1-25% of meals before discomfort. Will continue to offer nutrition options through shift. Care plan reviewed with patient. Patient verbalizes understanding in regards to plan of care and is active in goal setting.

## 2021-07-14 NOTE — PROGRESS NOTES
Progress note: Infectious diseases    Patient - Tosha Morgan,  Age - 31 y.o.    - 1990      Room Number - 5K-11/011-A   MRN -  [id number]   [facility]  Date of Admission -  2021  8:17 AM    SUBJECTIVE:   Operative note read. She feels better. No fever   OBJECTIVE   VITALS    height is 4' 11\" (1.499 m) and weight is 90 lb (40.8 kg). Her oral temperature is 97.8 °F (36.6 °C). Her blood pressure is 97/60 and her pulse is 80. Her respiration is 16 and oxygen saturation is 95%.        Wt Readings from Last 3 Encounters:   21 90 lb (40.8 kg)   21 94 lb 3.2 oz (42.7 kg)   10/11/18 118 lb (53.5 kg)       I/O (24 Hours)    Intake/Output Summary (Last 24 hours) at 2021 1739  Last data filed at 2021 1307  Gross per 24 hour   Intake 2959 ml   Output 3430 ml   Net -471 ml       General Appearance  Awake, alert, oriented,  not  In acute distress  HEENT - normocephalic, atraumatic, slighlty pale conjunctiva,  anicteric sclera  Neck - Supple, no mass  Lungs -  Bilateral   air entry,  Diminished  breath sound  Cardiovascular - Heart sounds are normal.     Abdomen - soft, +distended, drains in place, dressed wound  Neurologic -oriented  Skin - No bruising or bleeding  Extremities - No edema, no cyanosis, clubbing     MEDICATIONS:      sodium chloride flush  5-40 mL Intravenous 2 times per day    enoxaparin  40 mg Subcutaneous Daily    levofloxacin  750 mg Intravenous Q24H    insulin lispro  0-12 Units Subcutaneous Q6H    methylPREDNISolone  30 mg Intravenous Daily    metroNIDAZOLE  500 mg Intravenous Q8H    famotidine (PEPCID) injection  20 mg Intravenous BID      PN-Adult  3 IN 1 Central Line (Custom)      sodium chloride 30 mL/hr at 21    PN-Adult  3 IN 1 [place] (Custom) 75 mL/hr at 21    sodium chloride       HYDROmorphone **OR** HYDROmorphone, sodium chloride

## 2021-07-14 NOTE — PROGRESS NOTES
Patient complains of skin irritation around dressing site. Upon assessment this RN notes red, irritated skin underneath tape. Patient states relief when tape is rolled down off skin. Denies history of adhesive allergy. Will continue to monitor as needed.

## 2021-07-14 NOTE — PROGRESS NOTES
Spoke to [employer]. Plan for follow up with Esther Nyhan in 3 weeks. General surgery to update Esther Nyhan when they feel it is appropriate to resume Humira. Communicated GI sign off.

## 2021-07-15 ENCOUNTER — APPOINTMENT (OUTPATIENT)
Dept: GENERAL RADIOLOGY | Age: 31
DRG: 356 | End: 2021-07-15
Payer: COMMERCIAL

## 2021-07-15 LAB
ANION GAP SERPL CALCULATED.3IONS-SCNC: 7 MEQ/L (ref 8–16)
BUN BLDV-MCNC: 10 MG/DL (ref 7–22)
CALCIUM IONIZED: 1.11 MMOL/L (ref 1.12–1.32)
CALCIUM SERPL-MCNC: 8.1 MG/DL (ref 8.5–10.5)
CHLORIDE BLD-SCNC: 104 MEQ/L (ref 98–111)
CO2: 29 MEQ/L (ref 23–33)
CREAT SERPL-MCNC: 0.3 MG/DL (ref 0.4–1.2)
ERYTHROCYTE [DISTWIDTH] IN BLOOD BY AUTOMATED COUNT: 15 % (ref 11.5–14.5)
ERYTHROCYTE [DISTWIDTH] IN BLOOD BY AUTOMATED COUNT: 45.8 FL (ref 35–45)
GFR SERPL CREATININE-BSD FRML MDRD: > 90 ML/MIN/1.73M2
GLUCOSE BLD-MCNC: 100 MG/DL (ref 70–108)
GLUCOSE BLD-MCNC: 104 MG/DL (ref 70–108)
GLUCOSE BLD-MCNC: 108 MG/DL (ref 70–108)
GLUCOSE BLD-MCNC: 114 MG/DL (ref 70–108)
GLUCOSE BLD-MCNC: 117 MG/DL (ref 70–108)
GLUCOSE BLD-MCNC: 149 MG/DL (ref 70–108)
HCT VFR BLD CALC: 37.3 % (ref 37–47)
HEMOGLOBIN: 10.8 GM/DL (ref 12–16)
MAGNESIUM: 1.8 MG/DL (ref 1.6–2.4)
MCH RBC QN AUTO: 24.5 PG (ref 26–33)
MCHC RBC AUTO-ENTMCNC: 29 GM/DL (ref 32.2–35.5)
MCV RBC AUTO: 84.8 FL (ref 81–99)
PHOSPHORUS: 2.7 MG/DL (ref 2.4–4.7)
PLATELET # BLD: 328 THOU/MM3 (ref 130–400)
PMV BLD AUTO: 9.1 FL (ref 9.4–12.4)
POTASSIUM SERPL-SCNC: 3.8 MEQ/L (ref 3.5–5.2)
RBC # BLD: 4.4 MILL/MM3 (ref 4.2–5.4)
SODIUM BLD-SCNC: 140 MEQ/L (ref 135–145)
WBC # BLD: 16.1 THOU/MM3 (ref 4.8–10.8)

## 2021-07-15 PROCEDURE — 6360000002 HC RX W HCPCS: Performed by: NURSE PRACTITIONER

## 2021-07-15 PROCEDURE — 82948 REAGENT STRIP/BLOOD GLUCOSE: CPT

## 2021-07-15 PROCEDURE — 99232 SBSQ HOSP IP/OBS MODERATE 35: CPT | Performed by: NURSE PRACTITIONER

## 2021-07-15 PROCEDURE — 84100 ASSAY OF PHOSPHORUS: CPT

## 2021-07-15 PROCEDURE — 99024 POSTOP FOLLOW-UP VISIT: CPT | Performed by: NURSE PRACTITIONER

## 2021-07-15 PROCEDURE — 2500000003 HC RX 250 WO HCPCS: Performed by: NURSE PRACTITIONER

## 2021-07-15 PROCEDURE — 6370000000 HC RX 637 (ALT 250 FOR IP): Performed by: SURGERY

## 2021-07-15 PROCEDURE — 1200000000 HC SEMI PRIVATE

## 2021-07-15 PROCEDURE — 71046 X-RAY EXAM CHEST 2 VIEWS: CPT

## 2021-07-15 PROCEDURE — 80048 BASIC METABOLIC PNL TOTAL CA: CPT

## 2021-07-15 PROCEDURE — 6360000002 HC RX W HCPCS: Performed by: SURGERY

## 2021-07-15 PROCEDURE — 85027 COMPLETE CBC AUTOMATED: CPT

## 2021-07-15 PROCEDURE — 36415 COLL VENOUS BLD VENIPUNCTURE: CPT

## 2021-07-15 PROCEDURE — 83735 ASSAY OF MAGNESIUM: CPT

## 2021-07-15 PROCEDURE — 82330 ASSAY OF CALCIUM: CPT

## 2021-07-15 PROCEDURE — 2500000003 HC RX 250 WO HCPCS: Performed by: SURGERY

## 2021-07-15 PROCEDURE — APPSS45 APP SPLIT SHARED TIME 31-45 MINUTES: Performed by: NURSE PRACTITIONER

## 2021-07-15 RX ORDER — METHYLPREDNISOLONE SODIUM SUCCINATE 40 MG/ML
20 INJECTION, POWDER, LYOPHILIZED, FOR SOLUTION INTRAMUSCULAR; INTRAVENOUS DAILY
Status: DISCONTINUED | OUTPATIENT
Start: 2021-07-15 | End: 2021-07-17

## 2021-07-15 RX ADMIN — METRONIDAZOLE 500 MG: 500 INJECTION, SOLUTION INTRAVENOUS at 04:16

## 2021-07-15 RX ADMIN — METRONIDAZOLE 500 MG: 500 INJECTION, SOLUTION INTRAVENOUS at 13:10

## 2021-07-15 RX ADMIN — CALCIUM GLUCONATE: 98 INJECTION, SOLUTION INTRAVENOUS at 19:04

## 2021-07-15 RX ADMIN — METRONIDAZOLE 500 MG: 500 INJECTION, SOLUTION INTRAVENOUS at 20:27

## 2021-07-15 RX ADMIN — HYDROCODONE BITARTRATE AND ACETAMINOPHEN 2 TABLET: 5; 325 TABLET ORAL at 08:01

## 2021-07-15 RX ADMIN — HYDROCODONE BITARTRATE AND ACETAMINOPHEN 2 TABLET: 5; 325 TABLET ORAL at 13:26

## 2021-07-15 RX ADMIN — HYDROMORPHONE HYDROCHLORIDE 0.5 MG: 1 INJECTION, SOLUTION INTRAMUSCULAR; INTRAVENOUS; SUBCUTANEOUS at 05:57

## 2021-07-15 RX ADMIN — HYDROMORPHONE HYDROCHLORIDE 0.5 MG: 1 INJECTION, SOLUTION INTRAMUSCULAR; INTRAVENOUS; SUBCUTANEOUS at 10:33

## 2021-07-15 RX ADMIN — HYDROMORPHONE HYDROCHLORIDE 0.5 MG: 1 INJECTION, SOLUTION INTRAMUSCULAR; INTRAVENOUS; SUBCUTANEOUS at 02:56

## 2021-07-15 RX ADMIN — METHYLPREDNISOLONE SODIUM SUCCINATE 20 MG: 40 INJECTION, POWDER, FOR SOLUTION INTRAMUSCULAR; INTRAVENOUS at 08:02

## 2021-07-15 RX ADMIN — LEVOFLOXACIN 750 MG: 5 INJECTION, SOLUTION INTRAVENOUS at 21:39

## 2021-07-15 RX ADMIN — FAMOTIDINE 20 MG: 10 INJECTION INTRAVENOUS at 21:39

## 2021-07-15 RX ADMIN — FAMOTIDINE 20 MG: 10 INJECTION INTRAVENOUS at 08:04

## 2021-07-15 RX ADMIN — HYDROCODONE BITARTRATE AND ACETAMINOPHEN 2 TABLET: 5; 325 TABLET ORAL at 19:01

## 2021-07-15 ASSESSMENT — PAIN SCALES - GENERAL
PAINLEVEL_OUTOF10: 3
PAINLEVEL_OUTOF10: 0
PAINLEVEL_OUTOF10: 7
PAINLEVEL_OUTOF10: 7
PAINLEVEL_OUTOF10: 8
PAINLEVEL_OUTOF10: 3
PAINLEVEL_OUTOF10: 4
PAINLEVEL_OUTOF10: 4
PAINLEVEL_OUTOF10: 3
PAINLEVEL_OUTOF10: 7
PAINLEVEL_OUTOF10: 7

## 2021-07-15 NOTE — PROGRESS NOTES
Progress note: Infectious diseases    Patient - Leia Lee,  Age - 31 y.o.    - 1990      Room Number - 5K-11/011-A   MRN -  [id number]   Acct # - [de-identified]  Date of Admission -  2021  8:17 AM    SUBJECTIVE:   No new complaints. OBJECTIVE   VITALS    height is 4' 11\" (1.499 m) and weight is 90 lb (40.8 kg). Her oral temperature is 97.1 °F (36.2 °C). Her blood pressure is 109/69 and her pulse is 85. Her respiration is 16 and oxygen saturation is 95%.        Wt Readings from Last 3 Encounters:   21 90 lb (40.8 kg)   21 94 lb 3.2 oz (42.7 kg)   10/11/18 118 lb (53.5 kg)       I/O (24 Hours)    Intake/Output Summary (Last 24 hours) at 7/15/2021 1428  Last data filed at 7/15/2021 1323  Gross per 24 hour   Intake 3625.32 ml   Output 230 ml   Net 3395.32 ml       General Appearance  Awake, alert, oriented,  not  In acute distress  HEENT - normocephalic, atraumatic, slighlty pale conjunctiva,  anicteric sclera  Neck - Supple, no mass  Lungs -  Bilateral   air entry,  Diminished  breath sound  Cardiovascular - Heart sounds are normal.     Abdomen - soft, more distended,   Neurologic -oriented  Skin - No bruising or bleeding  Extremities - No edema, no cyanosis, clubbing     MEDICATIONS:      methylPREDNISolone  20 mg Intravenous Daily    sodium chloride flush  5-40 mL Intravenous 2 times per day    enoxaparin  40 mg Subcutaneous Daily    levofloxacin  750 mg Intravenous Q24H    insulin lispro  0-12 Units Subcutaneous Q6H    metroNIDAZOLE  500 mg Intravenous Q8H    famotidine (PEPCID) injection  20 mg Intravenous BID      PN-Adult  3 IN 1 Central Line (Custom)      PN-Adult  3 IN 1 Central Line (Custom) 75 mL/hr at 21 1832    sodium chloride 30 mL/hr at 21 1753    sodium chloride       HYDROmorphone **OR** HYDROmorphone, sodium chloride flush, sodium chloride, ondansetron **OR**

## 2021-07-15 NOTE — PROGRESS NOTES
exploratory laparotomy with washout and drain placementx 2. Patient seen- PPN infusing at 75ml/ hour, pt reports tolerating clear liquid diet this morning with no N/V; diet advanced to Full Liquid this morning; pt declines all ONS during LOS; pt reports no flatus or BM since surgery; last BM x 1 on 7/9; pt reports ongoing abdominal d/t surgery. No plan for PICC at this time, Medication includes pepcid, humalog, levaquin, solumedrol, flagyl; Labs 7/15: Na 140, K+ 3.8, BUN 10, Cr. .3, Glucose 104, Mg 1.8, Phos 2.8; reports poor appetite consuming less than 50% of meals over past week, states was following low fiber diet and just snacked occasionally throughout the day. Wounds:  Surgical Incision (7/13/21: abdomen incision: exploratory laparotomy with washout and drain placement)       Current Nutrition Therapies:    PN-Adult 3 IN 1 Peripheral Line (Custom)  ADULT DIET; Full Liquid  Current Parenteral Nutrition Orders:  · Type and Formula:  (12 kcal/kg, 1.2 grams protein/kg, 20% lipids with dose weight 43 kg)   · Lipids: Daily  · Duration: Continuous  · Rate/Volume: 75ml/ hour  · Current PN Order Provides: 516 kcals, 52 grams protein, 61 grams CHO, 10 grams fat/ 24 hours  · Goal PN Orders Provides: Next bag increase to 15 kcal/kg, 1.5 g/kg protein and 30% lipid kcals based on dosing weight of 43 kg to provide: 645 kcal, 64.5 grams protein, 57 grams dextrose and 193.5 lipid kcals    Anthropometric Measures:  · Height: 4' 11\" (149.9 cm)  · Current Body Weight: 95 lb 8 oz (43.3 kg) (7/13; bedscale; no edema noted)   · Admission Body Weight: 95 lb 8 oz (43.3 kg) (7/13; bedscale; no edema noted)    · Usual Body Weight:  (Per EMR: 94 lb 3.2 oz on 6/29/21)     · Ideal Body Weight: 95 lbs  · BMI: 19.3  · BMI Categories: Normal Weight (BMI 18.5-24. 9)       Nutrition Diagnosis:   · Severe malnutrition, In context of acute illness or injury related to inadequate protein-energy intake as evidenced by poor intake prior to admission, moderate loss of subcutaneous fat, moderate muscle loss    Nutrition Interventions:   Food and/or Nutrient Delivery:  Continue Current Diet, Modify Parenteral Nutrition (Pt declines all ONS during LOS)  Nutrition Education/Counseling:  Education initiated (Encouraged po intake of meals at best effort)   Coordination of Nutrition Care:  Continue to monitor while inpatient    Goals:  Pt will conume and/or tolerate adequate nutrition support to meet 75% or more of estimated nutritional needs during LOS until able to transition to only po feeds. Nutrition Monitoring and Evaluation:   Behavioral-Environmental Outcomes:  None Identified   Food/Nutrient Intake Outcomes:  Diet Advancement/Tolerance, Parenteral Nutrition Intake/Tolerance  Physical Signs/Symptoms Outcomes:  Biochemical Data, Constipation, GI Status, Nutrition Focused Physical Findings, Skin, Weight     Discharge Planning:     Too soon to determine     Electronically signed by Loly Villanueva MS, RD, LD on 7/15/21 at 9:13 AM EDT    Contact: [phone]

## 2021-07-15 NOTE — PROGRESS NOTES
Hospitalist Progress Note    Patient:  Kalpana Fowler      Unit/Bed:5K-11/011-A    YOB: 1990    MRN: [id number]       Acct: [de-identified]     PCP: Andrew Marshall MD    Date of Admission: 7/9/2021    Assessment/Plan:    1. Crohn's disease with acute exacerbation--per GI; she is currently on Solu-Medrol 30 mg daily~decreased to 20 mg daily from 7/15; Humira on hold since April; needs a 3-week follow-up with Dr. Tamia Monzon  2. Large intra-abdominal abscess status post laparotomy, washout and drain placement on 7/13/2021--per surgery; Levaquin 7/12; Flagyl 7/9; on PPN and tolerating; appreciate ID input  3. Moderate right pleural effusion--not in any distress, on room air; discussed with surgery; discussed right thoracentesis and patient wants to wait and will repeat a chest x-ray in the morning to check on lung status then, again she is in no distress  4. Leukocytosis--patient is on steroids, she remains afebrile, monitor  5. Chronic normocytic anemia  6. Severe malnutrition--per dietitian    Expected discharge date: Pending clinical course    Disposition:    [x] Home       [] TCU       [] Rehab       [] Psych       [] SNF       [] [place]       [] Other-    Chief Complaint: Abdominal pain    Hospital Course: Per initial H&P: \"The patient is a 33 y. o. female who presented to [facility] with abd pain. Patient was just discharged from our service last week. She had acute Crohn's exacerbation. She was discharged on 7/4/21 on 20 mg BID  PO Prednison. Patient was supposed to start on Humira yesterday. Patient reported worsening abdominal pain on 7/9/21 early AM. She presented to ED where CT showed large perihepatic and smaller pelvic fluid collections.  S/P 2 drains placement by IR\"    7/14--> had exploratory laparotomy along with washout and drain placement on 7/13; hemodynamically stable    7/15--> hemodynamically stable and afebrile, surgery repeated a chest x-ray today shows a moderate sized right pleural effusion along with moderate right basilar atelectasis/pneumonia and mild left basilar atelectasis/pneumonia~she is on Levaquin and again she is on room air and afebrile      Subjective (past 24 hours): Sitting up in bed, encouraged to exercise those lungs, states she feels a lot better; taking some clear liquids    Medications:  Reviewed    Infusion Medications    PN-Adult  3 IN 1 Central Line (Custom)      PN-Adult  3 IN 1 Central Line (Custom) 75 mL/hr at 07/14/21 1832    sodium chloride 30 mL/hr at 07/13/21 1753    sodium chloride       Scheduled Medications    methylPREDNISolone  20 mg Intravenous Daily    sodium chloride flush  5-40 mL Intravenous 2 times per day    enoxaparin  40 mg Subcutaneous Daily    levofloxacin  750 mg Intravenous Q24H    insulin lispro  0-12 Units Subcutaneous Q6H    metroNIDAZOLE  500 mg Intravenous Q8H    famotidine (PEPCID) injection  20 mg Intravenous BID     PRN Meds: HYDROmorphone **OR** HYDROmorphone, sodium chloride flush, sodium chloride, ondansetron **OR** ondansetron, HYDROcodone 5 mg - acetaminophen **OR** HYDROcodone 5 mg - acetaminophen, ondansetron **OR** ondansetron      Intake/Output Summary (Last 24 hours) at 7/15/2021 1348  Last data filed at 7/15/2021 1323  Gross per 24 hour   Intake 3625.32 ml   Output 230 ml   Net 3395.32 ml       Diet:  PN-Adult 3 IN 1 Peripheral Line (Custom)  ADULT DIET; Full Liquid  PN-Adult 3 IN 1 Peripheral Line (Custom)    Exam:  /69   Pulse 85   Temp 97.1 °F (36.2 °C) (Oral)   Resp 16   Ht 4' 11\" (1.499 m)   Wt 90 lb (40.8 kg)   LMP 07/09/2021   SpO2 95%   BMI 18.18 kg/m²     General appearance: No apparent distress, appears stated age and cooperative. HEENT: Pupils equal, round, and reactive to light. Conjunctivae/corneas clear. Neck: Supple, with full range of motion. No jugular venous distention. Trachea midline. Respiratory:  Normal respiratory effort.  Clear to auscultation, bilaterally except diminished in the bases without Rales/Wheezes/Rhonchi. Cardiovascular: Regular rate and rhythm with normal S1/S2 without murmurs, rubs or gallops. Abdomen: Surgical site and generalized tenderness; drains noted  Musculoskeletal: passive and active ROM x 4 extremities. Skin: Skin color, texture, turgor normal.    Neurologic:  Neurovascularly intact without any focal sensory/motor deficits. Cranial nerves: II-XII intact, grossly non-focal.  Psychiatric: Alert and oriented, thought content appropriate  Capillary Refill: Brisk,< 3 seconds   Peripheral Pulses: +2 palpable, equal bilaterally       Labs:   Recent Labs     07/13/21 0752 07/14/21  0434 07/15/21  0614   WBC 11.2* 14.0* 16.1*   HGB 11.0* 11.1* 10.8*   HCT 36.7* 37.2 37.3    278 328     Recent Labs     07/13/21 0752 07/14/21 0434 07/15/21  0614    141 140   K 3.8 3.8  3.8 3.8    105 104   CO2 29 28 29   BUN 12 10 10   CREATININE 0.4 0.3* 0.3*   CALCIUM 8.3* 8.1* 8.1*   PHOS 3.0 3.2 2.7     Microbiology:    Anaerobic and aerobic culture growing strep anginosus, Streptococcus conststellatus; strep mitis and oralis    Urinalysis:      Lab Results   Component Value Date    NITRU NEGATIVE 07/09/2021    WBCUA 5-9 07/09/2021    BACTERIA FEW 07/09/2021    RBCUA 3-5 07/09/2021    BLOODU LARGE 07/09/2021    GLUCOSEU NEGATIVE 07/09/2021       Radiology:  XR ACUTE ABD SERIES CHEST 1 VW    Result Date: 7/9/2021  PROCEDURE: XR ACUTE ABD SERIES CHEST 1 VW CLINICAL INFORMATION: severe pain COMPARISON: No prior study. TECHNIQUE: PA chest, AP supine abdomen and AP erect abdomen performed. FINDINGS: CHEST: POSTOPERATIVE CHANGES: None. LINES/TUBES/MECHANICAL DEVICES: None. TRACHEA/CARDIOMEDIASTINAL SILHOUETTE: Normal. LUNG FIELDS: Small right effusion at right base opacity. Interstitial and peribronchial thickening in the lower lobes. PNEUMOTHORAX: None. OSSEOUS STRUCTURES: 1.  No acute osseous abnormality. OTHER: None. ABDOMEN: POSTOPERATIVE CHANGES: None. LINES/TUBES/MECHANICAL DEVICES: None. BOWEL GAS PATTERN: 1. Except for air in the stomach, the bowel is gasless. FREE AIR: None. MASS SHADOWS: The liver appears large. PATHOLOGIC CALCIFICATIONS: None. OSSEOUS STRUCTURES: 1. Levocurvature of the lumbar spine. OTHER: None. 1. Small right effusion with right base consolidation. . 2. Unremarkable supine and upright views of the abdomen. 3. Hepatomegaly. **This report has been created using voice recognition software. It may contain minor errors which are inherent in voice recognition technology. ** Final report electronically signed by Dr. Janette Fontana on 7/9/2021 9:26 AM    CT GUIDED NEEDLE PLACEMENT    Result Date: 7/9/2021  CT-GUIDED ABSCESS DRAINAGE PERFORMED BY: Aayush Zhu. STERLING Hameed Oms: Fluid collections throughout the abdomen. One is within the right side and one is within the left side of the abdomen. APPROACH: Anterior right side of the abdomen. Anterior left side of the abdomen. CATHETER: 214 Cook Islander multipurpose drainage catheters. FLUID OBTAINED: Yellow-colored fluid on the right and reddish colored fluid on the left was aspirated from the abdomen. Specimens were sent to the laboratory for further analysis. SEDATION: Versed 1.5 mg and fentanyl 75 mcg , IV; the patient was sedated for 29 minutes during this procedure and monitored with EKG and pulse ox monitoring devices by a registered nurse. Face-to-face time with the patient was 29 minutes. PROCEDURE: Signed informed consent was obtained prior to performing this procedure. The patient was placed on the CT scanner and sedated, as indicated above. ALL CT SCANS AT THIS FACILITY use dose modulation, iterative reconstruction, and/or weight-based dosing when appropriate to reduce radiation dose to as low as reasonably achievable. CT images were initially obtained to determine appropriate puncture sites.  The skin was marked, prepped, and draped in a sterile fashion. Following local anesthesia and utilizing aseptic technique, needles were successfully passed into the fluid collections. A small amount of fluid was aspirated to confirm appropriate needle position. A guidewire was then passed through the needle followed by insertion of progressively larger dilators up to an port Western Melanie size. An 15 Citizen of [place] multi-side-hole drainage catheter was then passed over the wire and was coiled within the abscess pocket. The retaining suture was then locked in the standard fashion. Catheter was then hooked to a Prabhu-Close drainage bag and the catheter was flushed several times with sterile saline. The catheter was stabilized to the skin with a Percu-Stay device. A sample of the fluid was sent to laboratory for culture and sensitivity testing. Successful, uncomplicated placement of 14 Citizen of [place] drainage catheters into both sides of the abdomen. **This report has been created using voice recognition software. It may contain minor errors which are inherent in voice recognition technology. ** Final report electronically signed by Dr Zackary Bailey on 7/9/2021 3:38 PM    CT GUIDED NEEDLE PLACEMENT    Result Date: 7/9/2021  CT-GUIDED ABSCESS DRAINAGE PERFORMED BY: Helga Mcdaniels. STERLING Jenkins: Fluid collections throughout the abdomen. One is within the right side and one is within the left side of the abdomen. APPROACH: Anterior right side of the abdomen. Anterior left side of the abdomen. CATHETER: 214 Citizen of [place] multipurpose drainage catheters. FLUID OBTAINED: Yellow-colored fluid on the right and reddish colored fluid on the left was aspirated from the abdomen. Specimens were sent to the laboratory for further analysis. SEDATION: Versed 1.5 mg and fentanyl 75 mcg , IV; the patient was sedated for 29 minutes during this procedure and monitored with EKG and pulse ox monitoring devices by a registered nurse. Face-to-face time with the patient was 29 minutes.  PROCEDURE: Signed informed consent was obtained prior to performing this procedure. The patient was placed on the CT scanner and sedated, as indicated above. ALL CT SCANS AT THIS FACILITY use dose modulation, iterative reconstruction, and/or weight-based dosing when appropriate to reduce radiation dose to as low as reasonably achievable. CT images were initially obtained to determine appropriate puncture sites. The skin was marked, prepped, and draped in a sterile fashion. Following local anesthesia and utilizing aseptic technique, needles were successfully passed into the fluid collections. A small amount of fluid was aspirated to confirm appropriate needle position. A guidewire was then passed through the needle followed by insertion of progressively larger dilators up to an port Western Melanie size. An 15 Welsh multi-side-hole drainage catheter was then passed over the wire and was coiled within the abscess pocket. The retaining suture was then locked in the standard fashion. Catheter was then hooked to a Prabhu-Close drainage bag and the catheter was flushed several times with sterile saline. The catheter was stabilized to the skin with a Percu-Stay device. A sample of the fluid was sent to laboratory for culture and sensitivity testing. Successful, uncomplicated placement of 14 Welsh drainage catheters into both sides of the abdomen. **This report has been created using voice recognition software. It may contain minor errors which are inherent in voice recognition technology. ** Final report electronically signed by Dr Na Brooks on 7/9/2021 3:38 PM    CT ABDOMEN PELVIS W IV CONTRAST Additional Contrast? None    Result Date: 7/9/2021  PROCEDURE: CT ABDOMEN PELVIS W IV CONTRAST CLINICAL INFORMATION: diffuse abdominal pain COMPARISON: CT abdomen and pelvis June 29, 2021 TECHNIQUE: 5 mm axial imaging through the abdomen and pelvis with IV contrast.  Coronal and sagittal reconstructions were performed.  All CT scans at this facility use dose modulation, iterative reconstruction, and/or weight based dosing when appropriate to reduce the radiation dose to as low as reasonably achievable. CONTRAST: type and amount FINDINGS: LUNG BASES: Moderate bilateral pleural effusions are seen with adjacent atelectasis. LIVER/GALLBLADDER/BILIARY TREE: The liver measures 19.7 cm in craniocaudad dimension. There is redemonstration of a subcentimeter low-density focus in the lateral aspect of the posterior segment of the right lobe of the liver. The gallbladder is unremarkable except for prior cholecystectomy with elevated gallbladder pathology. PANCREAS/SPLEEN: Unremarkable. ADRENAL GLANDS: Unremarkable. KIDNEYS: Wedge-shaped bands of hypoenhancement are seen in both kidney, not present previously, appearance consistent with pyelonephritis. The kidneys overall do not appear particularly swollen at this time. . BOWEL: 1. Nonobstructive. There is diffuse thickening of the ileal loops, extending significantly more than previous, but the degree of thickness is not as severe as previous, particularly the terminal ileum. The colon is collapsed. The walls of the ascending colon and transverse colon appear inflamed and mildly thickened. Thickening of the walls of duodenal is also seen, with enhancement, also changed from previous. FREE AIR/FREE FLUID/INFLAMMATION: Large perihepatic collection with trapped air bubbles seen extending from dome of the liver through to the pelvis, measures approximately 4.5 cm in its largest AP dimension. A second collection is seen in the left lower quadrant, measures approximately 4.7 cm in largest AP dimension, approximately 13.5 cm in craniocaudad dimension and connects across the lower abdomen upper pelvis to the perihepatic collection (coronal images 22-27).  Smaller collections are seen in the pelvis centered at the periphery in a U shaped configuration, best demonstrated in the coronal plane and does not appear communicate with the larger, aforementioned collection. There is fluid throughout the mesentery. LYMPHADENOPATHY: 1. No pathologically enlarged lymph nodes. ABDOMINAL AORTA: Unremarkable. PELVIS: 1. U-shaped abscess centered around the periphery. Mesenteric inflammation. . ABDOMINAL WALL: Diffuse abdominal wall edema, with fluid density in the fascial planes, extending through to the imaged thigh. MUSCULOSKELETAL: Unremarkable lumbar spine and joints of the pelvis. No sign of infection. Levo curve of the lumbar spine. OTHER: None. 1.  Interval development of large perihepatic collection with trapped air bubbles extending from dome of the liver through to the lower abdomen/upper pelvis, measures approximately 4.5 cm in its largest AP dimension. A second collection is seen in the left lower quadrant, measures approximately 4.7 cm in largest AP dimension, approximately 13.5 cm in craniocaudad dimension and connects across the lower abdomen upper pelvis to the perihepatic collection, consistent with abscesses. Smaller collections are seen in the pelvis centered at the periphery in a U shaped configuration, best demonstrated in the coronal plane and does not appear to communicate with the larger, aforementioned collection. 2.  Mesenteric induration and free fluid. 3.  .Moderate bilateral pleural effusions with adjacent atelectasis. **This report has been created using voice recognition software. It may contain minor errors which are inherent in voice recognition technology. ** Final report electronically signed by Dr. Aiyana Lopez on 7/9/2021 10:53 AM    CT ABSCESS DRAINAGE SOFT TISSUE    Result Date: 7/9/2021  CT-GUIDED ABSCESS DRAINAGE PERFORMED BY: Susannah Merritt. Kasia Nicholas M.D. Ghazala Gallus: Fluid collections throughout the abdomen. One is within the right side and one is within the left side of the abdomen. APPROACH: Anterior right side of the abdomen. Anterior left side of the abdomen.  CATHETER: [id number] report electronically signed by Dr Maddy Whitmore on 7/9/2021 4:18 PM    CT ABSCESS DRAINAGE SOFT TISSUE    Result Date: 7/9/2021  CT-GUIDED ABSCESS DRAINAGE PERFORMED BY: Joel Sarah. Lem Ahumada, M.D. [place] Fitting: Fluid collections throughout the abdomen. One is within the right side and one is within the left side of the abdomen. APPROACH: Anterior right side of the abdomen. Anterior left side of the abdomen. CATHETER: 214 Chinese multipurpose drainage catheters. FLUID OBTAINED: Yellow-colored fluid on the right and reddish colored fluid on the left was aspirated from the abdomen. Specimens were sent to the laboratory for further analysis. SEDATION: Versed 1.5 mg and fentanyl 75 mcg , IV; the patient was sedated for 29 minutes during this procedure and monitored with EKG and pulse ox monitoring devices by a registered nurse. Face-to-face time with the patient was 29 minutes. PROCEDURE: Signed informed consent was obtained prior to performing this procedure. The patient was placed on the CT scanner and sedated, as indicated above. ALL CT SCANS AT THIS FACILITY use dose modulation, iterative reconstruction, and/or weight-based dosing when appropriate to reduce radiation dose to as low as reasonably achievable. CT images were initially obtained to determine appropriate puncture sites. The skin was marked, prepped, and draped in a sterile fashion. Following local anesthesia and utilizing aseptic technique, needles were successfully passed into the fluid collections. A small amount of fluid was aspirated to confirm appropriate needle position. A guidewire was then passed through the needle followed by insertion of progressively larger dilators up to an port Western Melanie size. An 15 Chinese multi-side-hole drainage catheter was then passed over the wire and was coiled within the abscess pocket. The retaining suture was then locked in the standard fashion.  Catheter was then hooked to a Prabhu-Close drainage bag and the catheter was

## 2021-07-15 NOTE — PLAN OF CARE
Problem: Pain:  Goal: Pain level will decrease  Description: Pain level will decrease  Note: Patient complaining of abdominal pain this shift. PRN dilaudid available for pain control. Patient encouraged to rest and reposition for additional comfort needs. Problem: Infection - Surgical Site:  Goal: Will show no infection signs and symptoms  Description: Will show no infection signs and symptoms  Outcome: Ongoing  Note: No new signs of infection so far this shift. Afebrile. Patient receiving IV Levaquin and flagyl. Patient provided with chg soap and encouraged to use at least once daily to prevent infection. Problem: GI  Goal: No bowel complications  Outcome: Ongoing  Note: Patient has abdominal surgical incision. 2 JAYMIE drains in place. Complaining of abdominal pain. Receiving PPN. Abdomen distended so far this shift. Problem:   Goal: Adequate urinary output  Outcome: Ongoing  Note: Patient having adequate urinary output so far this shift. Problem: Nutrition  Goal: Optimal nutrition therapy  Outcome: Ongoing  Note: PPN running through peripheral IV. Advanced to clear liquid diet today. Problem: Discharge Planning  Intervention: Interaction with patient/family and care team  Note: Patient from home with . Unknown discharge plans at this time. Care plan reviewed with patient. Patient verbalizes understanding of the plan of care and contributes to goal setting.

## 2021-07-15 NOTE — PLAN OF CARE
Problem: Nutrition  Goal: Optimal nutrition therapy  Outcome: Ongoing   Nutrition Problem #1: Severe malnutrition, In context of acute illness or injury  Intervention: Food and/or Nutrient Delivery: Continue Current Diet, Modify Parenteral Nutrition (Pt declines all ONS during LOS)  Nutritional Goals: Pt will conume and/or tolerate adequate nutrition support to meet 75% or more of estimated nutritional needs during LOS until able to transition to only po feeds.

## 2021-07-15 NOTE — PROGRESS NOTES
Chloé Oden   Daily Progress Note    Pt Name: Benton Stone Tokeland Record Number: [id number]  Date of Birth 1990   Today's Date: 7/15/2021    Hospital day # 6     ASSESSMENT   1. Large intra-abdominal abscess secondary to #6 - POD # 1 Status post exploratory lapartomy with washout and JAYMIE drain placement   2. Bilateral pleural effusions  3. Severe malnutrition POA  4. Leukocytosis  5. Thrombocytosis  6. Elevated liver enzymes  7. Crohn's disease   has a past medical history of Crohn's colitis ([facility] [address] PLAN   1. Trial full liquids but await bowel function. Continue PPN. 2. Nieto out and urinating without issues  3. IV antibiotics. ID following along. 4. Pain and nausea control  5. Up ad kofi  6. Wound & JAYMIE drain management - both drains more serous. Continue to monitor outputs. 7. Holding prednisone and Humira. GI will see as needed once she is healed up from surgery. 8. SCDs for DVT prophylaxis. Refusing Lovenox. 9. Medicine managing the weaning of steroids. Would like patient to be tapered off as soon as possible with surgery. 10. Check chest x-ray to look at pleural effusions  11. Trend labs  12. Clinically, looks good. Expect slow bowel function. Encouragement given. SUBJECTIVE   Chief complaint: Abdominal pain - surgical    Chart reviewed. Stable overnight. Looks good. States Norco working well. Abdominal dressing removed and abdominal pain better with tape removal. Generalized tenderness. JAYMIE drains are in place and both more serous. Nieto out. No urinary complaints. No nausea or vomiting. Denies chest pain or shortness of breath. No lightheadedness or dizziness. No flatus. Abdominal incision midline looks good. Abdomen still distended.    CURRENT MEDICATIONS   Scheduled Meds:   methylPREDNISolone  20 mg Intravenous Daily    sodium chloride flush  5-40 mL Intravenous 2 times per day    enoxaparin  40 mg Subcutaneous Daily    levofloxacin  750 mg Intravenous Q24H    insulin lispro  0-12 Units Subcutaneous Q6H    metroNIDAZOLE  500 mg Intravenous Q8H    famotidine (PEPCID) injection  20 mg Intravenous BID     Continuous Infusions:   PN-Adult  3 IN 1 Central Line (Custom)      PN-Adult  3 IN 1 Central Line (Custom) 75 mL/hr at 21 1832    sodium chloride 30 mL/hr at 21 1753    sodium chloride       PRN Meds:. HYDROmorphone **OR** HYDROmorphone, sodium chloride flush, sodium chloride, ondansetron **OR** ondansetron, HYDROcodone 5 mg - acetaminophen **OR** HYDROcodone 5 mg - acetaminophen, ondansetron **OR** ondansetron  OBJECTIVE   CURRENT VITALS:  height is 4' 11\" (1.499 m) and weight is 90 lb (40.8 kg). Her oral temperature is 97.1 °F (36.2 °C). Her blood pressure is 109/69 and her pulse is 85. Her respiration is 16 and oxygen saturation is 95%. Temperature Range (24h):Temp: 97.1 °F (36.2 °C) Temp  Av.8 °F (36.6 °C)  Min: 97.1 °F (36.2 °C)  Max: 98.3 °F (36.8 °C)  BP Range (28B): Systolic (13SNA), CGP:094 , Min:97 , JPA:253     Diastolic (68KLC), ULH:63, Min:60, Max:72    Pulse Range (24h): Pulse  Av.4  Min: 70  Max: 86  Respiration Range (24h): Resp  Av  Min: 16  Max: 16  Current Pulse Ox (24h):  SpO2: 95 %  Pulse Ox Range (24h):  SpO2  Av.3 %  Min: 95 %  Max: 96 %  Oxygen Amount and Delivery: O2 Flow Rate (L/min): 1 L/min  Incentive Spirometry Tx:            GENERAL: alert, cooperative, no distress  SKIN: Skin color, texture, turgor normal. No rashes or lesions. HEENT: Head is normocephalic, atraumatic. NECK: Supple, symmetrical, trachea midline  LUNGS: clear but diminished in right base  HEART: Regular rate and regular rhythm  ABDOMEN: soft, generalized tenderness, less distended, absent bowel sounds present. JAYMIE drains x2. Midline incision clean, dry and intact with steri-strips. NEUROLOGIC: There are no focalizing motor or sensory deficits. CN II-XII are grossly intact.   EXTREMITIES: no cyanosis, no clubbing and no edema. In: 2141.3 [P.O.:300; I.V.:1841.3]  Out: 170 [Drains:170]  Date 07/15/21 0000 - 07/15/21 2359   Shift 8559-5511 0350-2912 0449-0744 24 Hour Total   INTAKE   P.O. 100   100   I. V.(mL/kg/hr) 848(2.6)   848   Shift Total(mL/kg) 948(23.2)   948(23.2)   OUTPUT   Drains 40 30  70   Shift Total(mL/kg) 40(1) 30(0.7)  70(1.7)   Weight (kg) 40.8 40.8 40.8 40.8     LABS     Recent Labs     07/13/21  0752 07/14/21  0434 07/15/21  0614   WBC 11.2* 14.0* 16.1*   HGB 11.0* 11.1* 10.8*   HCT 36.7* 37.2 37.3    278 328    141 140   K 3.8 3.8  3.8 3.8    105 104   CO2 29 28 29   BUN 12 10 10   CREATININE 0.4 0.3* 0.3*   MG 1.8 1.9 1.8   PHOS 3.0 3.2 2.7   CALCIUM 8.3* 8.1* 8.1*      RADIOLOGY   No new imaging    Electronically signed by PRAVEENA Coon CNP on 7/15/2021 at 11:52 AM     Patient seen and examined independently by me early this AM. Above discussed and I agree with Lois Diehl CNP. See my additional comments below for updated orders and plan. Labs, cultures, and radiographs where available were reviewed. I discussed patient concerns with the patient's nurse and instructions were given. Please see our orders for the updated patient care plan. -Trial full liquids. Await bowel function. Continue JAYMIE drain. JPs are serous at this time. No signs of active bleeding. PPN. IV antibiotics. Infectious disease following. Urinating spontaneously. Incisional/wound care. DVT prophylaxis. Weaning off of steroids. Holding Humira. GI service is following. Chest x-ray today to further monitor pleural effusions. Thoracentesis as needed.     Electronically signed by Sheryl Miller MD on 7/15/21 at 12:29 PM EDT

## 2021-07-15 NOTE — PROGRESS NOTES
TPN Follow Up Note    Assessment: Diet advanced to full liquid diet today. Macronutrient changes per dietary. Patient still with only a peripheral IV (will need to watch osmolality closely).      Electrolyte Replacement: none    TPN changes for (today) at 1800:   - Increase sodium phosphate to 9 mmol/bag  - Increase calcium gluconate to 6.975 mEq/bag    Re-check BMP, Mg, PO4, iCa 7/16/2021

## 2021-07-16 ENCOUNTER — APPOINTMENT (OUTPATIENT)
Dept: GENERAL RADIOLOGY | Age: 31
DRG: 356 | End: 2021-07-16
Payer: COMMERCIAL

## 2021-07-16 ENCOUNTER — APPOINTMENT (OUTPATIENT)
Dept: ULTRASOUND IMAGING | Age: 31
DRG: 356 | End: 2021-07-16
Payer: COMMERCIAL

## 2021-07-16 LAB
ANION GAP SERPL CALCULATED.3IONS-SCNC: 8 MEQ/L (ref 8–16)
BUN BLDV-MCNC: 10 MG/DL (ref 7–22)
CALCIUM IONIZED: 1.17 MMOL/L (ref 1.12–1.32)
CALCIUM SERPL-MCNC: 8.6 MG/DL (ref 8.5–10.5)
CHLORIDE BLD-SCNC: 104 MEQ/L (ref 98–111)
CO2: 30 MEQ/L (ref 23–33)
CREAT SERPL-MCNC: 0.3 MG/DL (ref 0.4–1.2)
ERYTHROCYTE [DISTWIDTH] IN BLOOD BY AUTOMATED COUNT: 15.1 % (ref 11.5–14.5)
ERYTHROCYTE [DISTWIDTH] IN BLOOD BY AUTOMATED COUNT: 46.5 FL (ref 35–45)
GFR SERPL CREATININE-BSD FRML MDRD: > 90 ML/MIN/1.73M2
GLUCOSE BLD-MCNC: 103 MG/DL (ref 70–108)
GLUCOSE BLD-MCNC: 115 MG/DL (ref 70–108)
GLUCOSE BLD-MCNC: 119 MG/DL (ref 70–108)
GLUCOSE BLD-MCNC: 149 MG/DL (ref 70–108)
HCT VFR BLD CALC: 35.2 % (ref 37–47)
HEMOGLOBIN: 10.3 GM/DL (ref 12–16)
INR BLD: 1.45 (ref 0.85–1.13)
MAGNESIUM: 1.8 MG/DL (ref 1.6–2.4)
MCH RBC QN AUTO: 25 PG (ref 26–33)
MCHC RBC AUTO-ENTMCNC: 29.3 GM/DL (ref 32.2–35.5)
MCV RBC AUTO: 85.4 FL (ref 81–99)
PHOSPHORUS: 3.8 MG/DL (ref 2.4–4.7)
PLATELET # BLD: 312 THOU/MM3 (ref 130–400)
PMV BLD AUTO: 9.4 FL (ref 9.4–12.4)
POTASSIUM SERPL-SCNC: 3.9 MEQ/L (ref 3.5–5.2)
RBC # BLD: 4.12 MILL/MM3 (ref 4.2–5.4)
SODIUM BLD-SCNC: 142 MEQ/L (ref 135–145)
WBC # BLD: 14.2 THOU/MM3 (ref 4.8–10.8)

## 2021-07-16 PROCEDURE — 87075 CULTR BACTERIA EXCEPT BLOOD: CPT

## 2021-07-16 PROCEDURE — 85027 COMPLETE CBC AUTOMATED: CPT

## 2021-07-16 PROCEDURE — 71046 X-RAY EXAM CHEST 2 VIEWS: CPT

## 2021-07-16 PROCEDURE — 82948 REAGENT STRIP/BLOOD GLUCOSE: CPT

## 2021-07-16 PROCEDURE — 2500000003 HC RX 250 WO HCPCS: Performed by: SURGERY

## 2021-07-16 PROCEDURE — 87070 CULTURE OTHR SPECIMN AEROBIC: CPT

## 2021-07-16 PROCEDURE — 99233 SBSQ HOSP IP/OBS HIGH 50: CPT | Performed by: NURSE PRACTITIONER

## 2021-07-16 PROCEDURE — 71045 X-RAY EXAM CHEST 1 VIEW: CPT

## 2021-07-16 PROCEDURE — 6360000002 HC RX W HCPCS: Performed by: NURSE PRACTITIONER

## 2021-07-16 PROCEDURE — 2580000003 HC RX 258: Performed by: SURGERY

## 2021-07-16 PROCEDURE — 99024 POSTOP FOLLOW-UP VISIT: CPT | Performed by: NURSE PRACTITIONER

## 2021-07-16 PROCEDURE — 84100 ASSAY OF PHOSPHORUS: CPT

## 2021-07-16 PROCEDURE — 1200000000 HC SEMI PRIVATE

## 2021-07-16 PROCEDURE — 32555 ASPIRATE PLEURA W/ IMAGING: CPT

## 2021-07-16 PROCEDURE — 6370000000 HC RX 637 (ALT 250 FOR IP): Performed by: NURSE PRACTITIONER

## 2021-07-16 PROCEDURE — 83735 ASSAY OF MAGNESIUM: CPT

## 2021-07-16 PROCEDURE — 85610 PROTHROMBIN TIME: CPT

## 2021-07-16 PROCEDURE — 82330 ASSAY OF CALCIUM: CPT

## 2021-07-16 PROCEDURE — 6360000002 HC RX W HCPCS: Performed by: SURGERY

## 2021-07-16 PROCEDURE — 36415 COLL VENOUS BLD VENIPUNCTURE: CPT

## 2021-07-16 PROCEDURE — 6370000000 HC RX 637 (ALT 250 FOR IP): Performed by: SURGERY

## 2021-07-16 PROCEDURE — 87205 SMEAR GRAM STAIN: CPT

## 2021-07-16 PROCEDURE — 80048 BASIC METABOLIC PNL TOTAL CA: CPT

## 2021-07-16 PROCEDURE — APPSS30 APP SPLIT SHARED TIME 16-30 MINUTES: Performed by: NURSE PRACTITIONER

## 2021-07-16 RX ORDER — METOCLOPRAMIDE HYDROCHLORIDE 5 MG/ML
5 INJECTION INTRAMUSCULAR; INTRAVENOUS EVERY 8 HOURS
Status: DISCONTINUED | OUTPATIENT
Start: 2021-07-16 | End: 2021-07-19 | Stop reason: HOSPADM

## 2021-07-16 RX ORDER — DOCUSATE SODIUM 100 MG/1
100 CAPSULE, LIQUID FILLED ORAL ONCE
Status: COMPLETED | OUTPATIENT
Start: 2021-07-16 | End: 2021-07-16

## 2021-07-16 RX ADMIN — METOCLOPRAMIDE 5 MG: 5 INJECTION, SOLUTION INTRAMUSCULAR; INTRAVENOUS at 17:48

## 2021-07-16 RX ADMIN — METHYLPREDNISOLONE SODIUM SUCCINATE 20 MG: 40 INJECTION, POWDER, FOR SOLUTION INTRAMUSCULAR; INTRAVENOUS at 09:34

## 2021-07-16 RX ADMIN — METRONIDAZOLE 500 MG: 500 INJECTION, SOLUTION INTRAVENOUS at 04:24

## 2021-07-16 RX ADMIN — HYDROCODONE BITARTRATE AND ACETAMINOPHEN 2 TABLET: 5; 325 TABLET ORAL at 20:47

## 2021-07-16 RX ADMIN — SODIUM CHLORIDE, PRESERVATIVE FREE 10 ML: 5 INJECTION INTRAVENOUS at 22:24

## 2021-07-16 RX ADMIN — CALCIUM GLUCONATE: 98 INJECTION, SOLUTION INTRAVENOUS at 17:48

## 2021-07-16 RX ADMIN — METRONIDAZOLE 500 MG: 500 INJECTION, SOLUTION INTRAVENOUS at 12:56

## 2021-07-16 RX ADMIN — SODIUM CHLORIDE: 9 INJECTION, SOLUTION INTRAVENOUS at 17:53

## 2021-07-16 RX ADMIN — FAMOTIDINE 20 MG: 10 INJECTION INTRAVENOUS at 20:46

## 2021-07-16 RX ADMIN — LEVOFLOXACIN 750 MG: 5 INJECTION, SOLUTION INTRAVENOUS at 22:24

## 2021-07-16 RX ADMIN — DOCUSATE SODIUM 100 MG: 100 CAPSULE, LIQUID FILLED ORAL at 09:34

## 2021-07-16 RX ADMIN — METRONIDAZOLE 500 MG: 500 INJECTION, SOLUTION INTRAVENOUS at 20:46

## 2021-07-16 RX ADMIN — METOCLOPRAMIDE 5 MG: 5 INJECTION, SOLUTION INTRAMUSCULAR; INTRAVENOUS at 09:40

## 2021-07-16 RX ADMIN — HYDROCODONE BITARTRATE AND ACETAMINOPHEN 2 TABLET: 5; 325 TABLET ORAL at 09:34

## 2021-07-16 RX ADMIN — FAMOTIDINE 20 MG: 10 INJECTION INTRAVENOUS at 09:34

## 2021-07-16 RX ADMIN — HYDROCODONE BITARTRATE AND ACETAMINOPHEN 2 TABLET: 5; 325 TABLET ORAL at 14:24

## 2021-07-16 RX ADMIN — HYDROCODONE BITARTRATE AND ACETAMINOPHEN 2 TABLET: 5; 325 TABLET ORAL at 02:34

## 2021-07-16 ASSESSMENT — PAIN SCALES - GENERAL
PAINLEVEL_OUTOF10: 0
PAINLEVEL_OUTOF10: 7
PAINLEVEL_OUTOF10: 4
PAINLEVEL_OUTOF10: 6
PAINLEVEL_OUTOF10: 7
PAINLEVEL_OUTOF10: 0
PAINLEVEL_OUTOF10: 6
PAINLEVEL_OUTOF10: 3

## 2021-07-16 ASSESSMENT — PAIN DESCRIPTION - ORIENTATION: ORIENTATION: RIGHT;LOWER

## 2021-07-16 ASSESSMENT — PAIN DESCRIPTION - ONSET: ONSET: ON-GOING

## 2021-07-16 ASSESSMENT — PAIN DESCRIPTION - DESCRIPTORS: DESCRIPTORS: ACHING

## 2021-07-16 ASSESSMENT — PAIN DESCRIPTION - FREQUENCY: FREQUENCY: CONTINUOUS

## 2021-07-16 ASSESSMENT — PAIN - FUNCTIONAL ASSESSMENT: PAIN_FUNCTIONAL_ASSESSMENT: ACTIVITIES ARE NOT PREVENTED

## 2021-07-16 ASSESSMENT — PAIN DESCRIPTION - PROGRESSION: CLINICAL_PROGRESSION: NOT CHANGED

## 2021-07-16 ASSESSMENT — PAIN DESCRIPTION - LOCATION: LOCATION: ABDOMEN

## 2021-07-16 ASSESSMENT — PAIN DESCRIPTION - PAIN TYPE: TYPE: ACUTE PAIN

## 2021-07-16 NOTE — PROGRESS NOTES
Comprehensive Nutrition Assessment    Type and Reason for Visit:  Reassess (PO/PPN Monitor)    Nutrition Recommendations/Plan:   *Recommend continue 3-in-1 PPN macronutrient's at 15 kcal/kg, 1.5 g/kg protein and 30% lipid kcals based on dosing weight of 43 kg  *Diet per General Surgery. *Pt declines all ONS and routine snacks during LOS. Nutrition Assessment: Pt improving from a nutritional standpoint AEB pt tolerating FullLiquid diet with no nausea/emesis consuming 25-50% of meals and 3-in-1 PPN is meeting ~50% estimated calorie and 100% of protein needs. Remains at risk for further nutritional compromise r/t severe malnutrition, admit d/t intra-abdominal abscess s/p percutaneous drain placement x2 and bilateral pleural effusions; 7/13/21: exploratory laparotomy with washout and drain placement; Full Liquid diet at present with need for PPN to meet estimated nutritional needs and underlying medical condition (hx Crohn's disease). Nutrition recommendations/interventions as per above. Malnutrition Assessment:  Malnutrition Status:  Severe malnutrition    Context:  Acute Illness     Findings of the 6 clinical characteristics of malnutrition:  Energy Intake:  7 - 50% or less of estimated energy requirements for 5 or more days  Weight Loss:  No significant weight loss     Body Fat Loss:  7 - Moderate body fat loss Orbital   Muscle Mass Loss:  7 - Moderate muscle mass loss Temples (temporalis)  Fluid Accumulation:  No significant fluid accumulation Extremities   Strength:  Not Performed    Estimated Daily Nutrient Needs:  Energy (kcal):  ~1290 kcal/day (~30 kcal/kg); Weight Used for Energy Requirements:   (43 kg)     Protein (g):  52-65 g/day (1.2-1.5g/kg); Weight Used for Protein Requirements:   (43 kg)        Fluid (ml/day):  1290+ mL/day (~30+ mL/kg);  Method Used for Fluid Requirements:  1 ml/kcal      Nutrition Related Findings: Admit due to intra-abdominal abscess s/p percutaneous drain placement x 2 and bilateral pleural effusions, 7/13/21: exploratory laparotomy with washout and drain placementx 2. Patient seen- PPN infusing at 75ml/ hour, pt reports tolerating Full Liquid diet this morning with no N/V consuming 25-50% of most meals; pt declines all ONS and routine snacks during LOS; pt reports no flatus or BM since surgery; last BM x 1 on 7/9. No plan for PICC at this time, Medications include: colace, pepcid, humalog, levaquin, solumedrol, flagyl; Labs 7/16: Na 142, K+ 3.9, BUN 10, Cr. .3, Glucose 103, Phos 3.8; 7/15: Mg 1.8. Pt reports poor appetite consuming less than 50% of meals over past week, states was following low fiber diet and just snacked occasionally throughout the day. Wounds:  Surgical Incision (7/13/21: abdomen incision: exploratory laparotomy with washout and drain placement)       Current Nutrition Therapies:    ADULT DIET; Full Liquid  PN-Adult 3 IN 1 Peripheral Line (Custom)  Current Parenteral Nutrition Orders:  · Type and Formula: 3-in-1 Peripheral (15 kcal/kg, 1.5 g/kg protein and 30% lipid kcals based on dosing weight of 43 kg)   · Lipids: Daily  · Duration: Continuous  · Rate/Volume: 75 mL/hr/1800 mL total volume  · Current PN Order Provides: 645 kcal, 64.5 grams protein, 57 grams dextrose and 193.5 lipid kcals  · Goal PN Orders Provides: As stated above. Anthropometric Measures:  · Height: 4' 11\" (149.9 cm)  · Current Body Weight: 95 lb 8 oz (43.3 kg) (7/13; bedscale; no edema noted)   · Admission Body Weight: 95 lb 8 oz (43.3 kg) (7/13; bedscale; no edema noted)    · Usual Body Weight:  (Per EMR: 94 lb 3.2 oz on 6/29/21)     · Ideal Body Weight: 95 lbs  · BMI: 19.3  · BMI Categories: Normal Weight (BMI 18.5-24. 9)       Nutrition Diagnosis:   · Severe malnutrition, In context of acute illness or injury related to inadequate protein-energy intake as evidenced by poor intake prior to admission, moderate loss of subcutaneous fat, moderate muscle loss    Nutrition Interventions: Food and/or Nutrient Delivery:  Continue Current Diet, Continue Current Parenteral Nutrition (Pt declines all ONS and routine snacks during LOS)  Nutrition Education/Counseling:  Education initiated (Encouraged po intake of meals at best effort)   Coordination of Nutrition Care:  Continue to monitor while inpatient    Goals:  Pt will conume and/or tolerate adequate nutrition support to meet 75% or more of estimated nutritional needs during LOS until able to transition to only po feeds. Nutrition Monitoring and Evaluation:   Behavioral-Environmental Outcomes:  None Identified   Food/Nutrient Intake Outcomes:  Diet Advancement/Tolerance, Food and Nutrient Intake, Enteral Nutrition Intake/Tolerance  Physical Signs/Symptoms Outcomes:  Biochemical Data, Weight, Skin, Nutrition Focused Physical Findings, Fluid Status or Edema, GI Status     Discharge Planning:     Too soon to determine     Electronically signed by Colton Wen MS, RD, LD on 7/16/21 at 9:27 AM EDT    Contact: [phone]

## 2021-07-16 NOTE — PROGRESS NOTES
TPN Follow Up Note    Assessment: Advanced to full liquids. All electrolytes WNL and stable with the exception of phosphorous which increased significantly overnight.     Electrolyte Replacement:   None    TPN changes for (today) at 1800:   No macronutrient changes per dietary  Decrease NaPhos back to 6 mmol    Re-check BMP, Mg, PO4, iCa 7/17/21    Artur Edward, PharmD, BCPS  7/16/2021  10:23 AM

## 2021-07-16 NOTE — CARE COORDINATION
7/16/21, 3:34 PM EDT    DISCHARGE ON GOING [address] day: 7  Location: -11/011-A Reason for admit: Intra-abdominal abscess ([facility] [address] [K65.1]   Procedure: 7/14/2021 Exploratory laparotomy with washout and drain  placement x2.  7/16/2021 Ultrasound guided right thoracentesis, 0.7 L fluid removed. Barriers to Discharge: Hospitalist and ID following, PPN per pharmacy dosing, Dietician following, Lovenox, ISS, IV Pepcid, IV Levaquin, Solu-Medrol, IV Reglan scheduled, IV Flagyl, prn medications, daily labs, abdominal binder, ambulate, closed drain care, SCD's, incentive spirometry, up with assistance as tolerated. PCP: Alexander Ny MD  Readmission Risk Score: 14%  Patient Goals/Plan/Treatment Preferences: Gaetano Staples is from home with her . She denies needs at discharge. Monitoring.

## 2021-07-16 NOTE — PROGRESS NOTES
Progress note: Infectious diseases    Patient - Kalpana Fowler,  Age - 31 y.o.    - 1990      Room Number - 5K-11/011-A   MRN -  [id number]   [facility]  Date of Admission -  2021  8:17 AM    SUBJECTIVE:   No new complaints. no bowel movement. OBJECTIVE   VITALS    height is 4' 11\" (1.499 m) and weight is 90 lb (40.8 kg). Her oral temperature is 97.4 °F (36.3 °C). Her blood pressure is 112/68 and her pulse is 83. Her respiration is 16 and oxygen saturation is 95%. Wt Readings from Last 3 Encounters:   21 90 lb (40.8 kg)   21 94 lb 3.2 oz (42.7 kg)   10/11/18 118 lb (53.5 kg)       I/O (24 Hours)    Intake/Output Summary (Last 24 hours) at 2021 1425  Last data filed at 2021 1326  Gross per 24 hour   Intake 2921 ml   Output 130 ml   Net 2791 ml       General Appearance  Awake, alert, oriented,  not  In acute distress  HEENT - normocephalic, atraumatic, slighlty pale conjunctiva,  anicteric sclera  Neck - Supple, no mass. Lungs -  Bilateral   air entry,  Diminished  breath sound. Cardiovascular - Heart sounds are normal.     Abdomen - soft,less distended. Mid line clean surgical wound, one drain in place. Neurologic -oriented. Skin - No bruising or bleeding.   Extremities - No edema, no cyanosis, clubbing     MEDICATIONS:      metoclopramide  5 mg Intravenous Q8H    methylPREDNISolone  20 mg Intravenous Daily    sodium chloride flush  5-40 mL Intravenous 2 times per day    enoxaparin  40 mg Subcutaneous Daily    levofloxacin  750 mg Intravenous Q24H    insulin lispro  0-12 Units Subcutaneous Q6H    metroNIDAZOLE  500 mg Intravenous Q8H    famotidine (PEPCID) injection  20 mg Intravenous BID      PN-Adult  3 IN 1 Central Line (Custom)      PN-Adult  3 IN 1 Central Line (Custom) 75 mL/hr at 07/15/21 1904    sodium chloride 30 mL/hr at 21 1753    sodium chloride

## 2021-07-16 NOTE — PROGRESS NOTES
Pastor Leonardo   Daily Progress Note    Pt Name: Benton Stone Raymond Record Number: [id number]  Date of Birth 1990   Today's Date: 7/16/2021    Hospital day # 7     ASSESSMENT   1. Large intra-abdominal abscess secondary to #6 - POD # 3 Status post exploratory lapartomy with washout and JAYMIE drain placement   2. Bilateral pleural effusions - status post thoracentesis today . 7L drained  3. Severe malnutrition POA  4. Leukocytosis  5. Thrombocytosis  6. Elevated liver enzymes  7. Crohn's disease   has a past medical history of Crohn's colitis ([facility] [address] PLAN   1. Continue full liquids and await bowel function. Continue PPN. 2. Thoracentesis this morning. Tolerated well. Chest x-rays as needed. 3. IV antibiotics. ID following along. 4. Pain and nausea control  5. Start Reglan   6. Up ad kofi  7. Wound & JAYMIE drain management - left drain removed today. Right drain serous. 8. SCDs for DVT prophylaxis. Refusing Lovenox. 9. Medicine managing the weaning of steroids. Appreciate assistance. 10. Trend labs. WBC down today to 14.2. 11. Clinically, looks good. Awaiting bowel function. SUBJECTIVE   Chief complaint: No new complaints    Chart reviewed. Stable overnight. Looks good. States Norco working well. Abdominal midline incision looks good. JAYMIE drain right in place and serous. No urinary complaints. No nausea or vomiting. Abdomen is distended still. Denies chest pain or shortness of breath. No lightheadedness or dizziness. No flatus.   CURRENT MEDICATIONS   Scheduled Meds:   methylPREDNISolone  20 mg Intravenous Daily    sodium chloride flush  5-40 mL Intravenous 2 times per day    enoxaparin  40 mg Subcutaneous Daily    levofloxacin  750 mg Intravenous Q24H    insulin lispro  0-12 Units Subcutaneous Q6H    metroNIDAZOLE  500 mg Intravenous Q8H    famotidine (PEPCID) injection  20 mg Intravenous BID     Continuous Infusions:   PN-Adult  3 IN 1 Central Line (Custom) 75 mL/hr at 07/15/21 1904    sodium chloride 30 mL/hr at 21 1753    sodium chloride       PRN Meds:. HYDROmorphone **OR** HYDROmorphone, sodium chloride flush, sodium chloride, ondansetron **OR** ondansetron, HYDROcodone 5 mg - acetaminophen **OR** HYDROcodone 5 mg - acetaminophen, ondansetron **OR** ondansetron  OBJECTIVE   CURRENT VITALS:  height is 4' 11\" (1.499 m) and weight is 90 lb (40.8 kg). Her oral temperature is 98.1 °F (36.7 °C). Her blood pressure is 114/76 and her pulse is 79. Her respiration is 16 and oxygen saturation is 97%. Temperature Range (24h):Temp: 98.1 °F (36.7 °C) Temp  Av.6 °F (36.4 °C)  Min: 97.1 °F (36.2 °C)  Max: 98.1 °F (36.7 °C)  BP Range (30V): Systolic (72YBD), KAH:406 , Min:106 , ZZN:321     Diastolic (97NKW), QLZ:62, Min:69, Max:85    Pulse Range (24h): Pulse  Av.5  Min: 79  Max: 85  Respiration Range (24h): Resp  Av  Min: 16  Max: 16  Current Pulse Ox (24h):  SpO2: 97 %  Pulse Ox Range (24h):  SpO2  Av.8 %  Min: 95 %  Max: 99 %  Oxygen Amount and Delivery: O2 Flow Rate (L/min): 1 L/min  Incentive Spirometry Tx:            GENERAL: alert, cooperative, no distress  SKIN: Skin color, texture, turgor normal. No rashes or lesions. HEENT: Head is normocephalic, atraumatic. NECK: Supple, symmetrical, trachea midline  LUNGS: clear but diminished in right base  HEART: Regular rate and regular rhythm  ABDOMEN: soft, generalized tenderness, less distended, absent bowel sounds present. JAYMIE drain right. Left drain removed. . Midline incision clean, dry and intact with steri-strips. NEUROLOGIC: There are no focalizing motor or sensory deficits. CN II-XII are grossly intact. EXTREMITIES: no cyanosis, no clubbing and no edema. In: 4846 [P.O.:450; I.V.:1334]  Out: 45 [Drains:45]  Date 21 - 21   Shift  0085-1487 0112-6635 24 Hour Total   INTAKE   P.O. 200   200   I. V.(mL/kg/hr) 1004   1004   Shift Total(mL/kg) 6172(21.0)   1540(42.5)   OUTPUT   Shift Total(mL/kg)       Weight (kg) 40.8 40.8 40.8 40.8     LABS     Recent Labs     07/13/21  0752 07/13/21  0752 07/14/21  0434 07/15/21  0614 07/16/21  0630   WBC 11.2*   < > 14.0* 16.1* 14.2*   HGB 11.0*   < > 11.1* 10.8* 10.3*   HCT 36.7*   < > 37.2 37.3 35.2*      < > 278 328 312     --  141 140  --    K 3.8  --  3.8  3.8 3.8  --      --  105 104  --    CO2 29  --  28 29  --    BUN 12  --  10 10  --    CREATININE 0.4  --  0.3* 0.3*  --    MG 1.8  --  1.9 1.8  --    PHOS 3.0  --  3.2 2.7  --    CALCIUM 8.3*  --  8.1* 8.1*  --     < > = values in this interval not displayed. RADIOLOGY     PROCEDURE: XR CHEST 1 VIEW       CLINICAL INFORMATION: 29-year-old female who underwent a right-sided thoracentesis.       COMPARISON: Chest x-ray 7/16/2021.       TECHNIQUE: AP upright view of the chest was obtained.       FINDINGS:    The right-sided pleural effusion has decreased in size.       There is no evidence of a pneumothorax.       The cardiac silhouette and pulmonary vasculature are within normal limits.       Visualized portions of the upper abdomen are within normal limits.       The osseous structures are intact. No acute fractures or suspicious osseous lesions.           Impression   No pneumothorax following a right-sided thoracentesis.           **This report has been created using voice recognition software. It may contain minor errors which are inherent in voice recognition technology. **       Final report electronically signed by Dr Jan Nguyen on 7/16/2021 12:11 PM     PROCEDURE: US THORACENTESIS       CLINICAL INFORMATION: 70-year-old female with a right-sided pleural effusion .       COMPARISON: No prior study.       PROCEDURE:       Physician performing procedure: Dr Gary Paez   Informed consent signed: Yes   Local Anesthetic: 2%   Specimen volume: 70 ml   Catheter: 19 ga 5 Sudanese   Aspirated pleural fluid volume: 0.7 liters   Aspirated pleural fluid color: Yellow   Complications: None observed       The benefits and the risk of the procedure were explained to the patient. The patient was given an opportunity to ask questions. Signed consent was obtained. Limited ultrasound exam of the right chest showed  large  amount of pleural fluid.  Using ultrasound guidance, a site was marked on the skin.           The right side of the posterior chest was prepped and draped using the usual sterile technique and the skin was anesthetized with 2 percent lidocaine.        A 5 Argentine one-step catheter was introduced to the right pleural space. 0.7 L of yellow-colored fluid drained. The catheter was then removed.       The patient tolerated the procedure well with no complications. Specimen sent for laboratory studies as requested.           Impression       Successful ultrasound-guided thoracentesis with  0.7 L of fluid drained.                   **This report has been created using voice recognition software. It may contain minor errors which are inherent in voice recognition technology. **           Final report electronically signed by Dr Na Brooks on 7/16/2021 1:06 PM     2 view chest x-ray       Comparison:  CR,SR  - XR CHEST STANDARD (2 VW)  - 07/15/2021 12:47 PM EDT       Findings: Moderate to large right pleural effusion is unchanged. Small left pleural effusion. Partial atelectasis or consolidation left medial lung base unchanged. Heart size is normal.   No acute fracture.           Impression   Unchanged effusions.       This document has been electronically signed by: Fabien Buckner MD on 07/16/2021 08:08 AM     PROCEDURE: XR CHEST (2 VW)       CLINICAL INFORMATION: follow up imaging bilateral pleural effusions       COMPARISON: 7/19/2021       TECHNIQUE: PA and lateral views of the chest were obtained.               Impression   1. Normal heart size. Tiny effusion left side. Moderate-sized effusion right side.  Moderate right basilar atelectasis/pneumonia. Mild left basilar atelectasis/pneumonia. 2. Overall appearance of chest has worsened since prior.               **This report has been created using voice recognition software.  It may contain minor errors which are inherent in voice recognition technology. **       Final report electronically signed by Dr. Tremaine Reinoso on 7/15/2021 12:53 PM     Electronically signed by PRAVEENA Sinclair CNP on 7/16/2021 at 7:17 AM     Patient seen and examined independently by me early this AM. Above discussed and I agree with Dimas Kumar CNP. See my additional comments below for updated orders and plan. Labs, cultures, and radiographs where available were reviewed. I discussed patient concerns with the patient's nurse and instructions were given. Please see our orders for the updated patient care plan. -Thoracentesis today. Awaiting bowel function. Remove left JAYMIE drain. DVT prophylaxis. Leukocytosis improving. Reglan. Clinically looking better. Continue IV antibiotics. Incisional/wound care. Ambulate.     Electronically signed by Brandy Cm MD on 7/16/21 at 4:24 PM EDT

## 2021-07-16 NOTE — PROGRESS NOTES
Hospitalist Progress Note    Patient:  Emilia Boss      Unit/Bed:5K-11/011-A    YOB: 1990    MRN: [id number]       Acct: [de-identified]     PCP: Zeny Amaya MD    Date of Admission: 7/9/2021    Assessment/Plan:    1. Crohn's disease with acute exacerbation--per GI; on Solu-Medrol 20 mg daily from 7/15 x 2 doses and will decrease to 10 mg starting tomorrow; Humira on hold since April; needs a 3-week follow-up with Dr. Sarbjit Ordaz  2. Large intra-abdominal abscess status post laparotomy, washout and drain placement on 7/13/2021--per surgery; Levaquin 7/12; Flagyl 7/9; on PPN and tolerating; appreciate ID input  3. Moderate right pleural effusion--not in any distress, on room air; repeat chest x-ray shows moderate to severe right effusion today so patient does agree for thoracentesis; will order INR  4. Leukocytosis--patient is on steroids, she remains afebrile, trending down, monitor  5. Chronic normocytic anemia--stable  6. Severe malnutrition--per dietitian    Expected discharge date: Pending clinical course    Disposition:    [x] Home       [] TCU       [] Rehab       [] Psych       [] SNF       [] [facility]       [] Other-    Chief Complaint: Abdominal pain    Hospital Course: Per initial H&P: \"The patient is a 33 y. o. female who presented to [address] [place] [address] with abd pain. Patient was just discharged from our service last week. She had acute Crohn's exacerbation. She was discharged on 7/4/21 on 20 mg BID  PO Prednison. Patient was supposed to start on Humira yesterday. Patient reported worsening abdominal pain on 7/9/21 early AM. She presented to ED where CT showed large perihepatic and smaller pelvic fluid collections.  S/P 2 drains placement by IR\"    7/14--> had exploratory laparotomy along with washout and drain placement on 7/13; hemodynamically stable    7/15--> hemodynamically stable and afebrile, surgery repeated a chest x-ray today shows a moderate sized right midline. Respiratory:  Normal respiratory effort. Clear to auscultation however diminished to the right side. Cardiovascular: Regular rate and rhythm with normal S1/S2 without murmurs, rubs or gallops. Abdomen: Surgical site and generalized tenderness; drains noted  Musculoskeletal: passive and active ROM x 4 extremities. Skin: Skin color, texture, turgor normal.    Neurologic:  Neurovascularly intact without any focal sensory/motor deficits. Cranial nerves: II-XII intact, grossly non-focal.  Psychiatric: Alert and oriented, thought content appropriate  Capillary Refill: Brisk,< 3 seconds   Peripheral Pulses: +2 palpable, equal bilaterally       Labs:   Recent Labs     07/14/21  0434 07/15/21  0614 07/16/21  0630   WBC 14.0* 16.1* 14.2*   HGB 11.1* 10.8* 10.3*   HCT 37.2 37.3 35.2*    328 312     Recent Labs     07/13/21  0752 07/14/21  0434 07/15/21  0614    141 140   K 3.8 3.8  3.8 3.8    105 104   CO2 29 28 29   BUN 12 10 10   CREATININE 0.4 0.3* 0.3*   CALCIUM 8.3* 8.1* 8.1*   PHOS 3.0 3.2 2.7     Microbiology:    Anaerobic and aerobic culture growing strep anginosus, Streptococcus conststellatus; strep mitis and oralis    Urinalysis:      Lab Results   Component Value Date    NITRU NEGATIVE 07/09/2021    WBCUA 5-9 07/09/2021    BACTERIA FEW 07/09/2021    RBCUA 3-5 07/09/2021    BLOODU LARGE 07/09/2021    GLUCOSEU NEGATIVE 07/09/2021       Radiology:  XR ACUTE ABD SERIES CHEST 1 VW    Result Date: 7/9/2021  PROCEDURE: XR ACUTE ABD SERIES CHEST 1 VW CLINICAL INFORMATION: severe pain COMPARISON: No prior study. TECHNIQUE: PA chest, AP supine abdomen and AP erect abdomen performed. FINDINGS: CHEST: POSTOPERATIVE CHANGES: None. LINES/TUBES/MECHANICAL DEVICES: None. TRACHEA/CARDIOMEDIASTINAL SILHOUETTE: Normal. LUNG FIELDS: Small right effusion at right base opacity. Interstitial and peribronchial thickening in the lower lobes. PNEUMOTHORAX: None.  OSSEOUS STRUCTURES: 1.  No acute osseous abnormality. OTHER: None. ABDOMEN: POSTOPERATIVE CHANGES: None. LINES/TUBES/MECHANICAL DEVICES: None. BOWEL GAS PATTERN: 1. Except for air in the stomach, the bowel is gasless. FREE AIR: None. MASS SHADOWS: The liver appears large. PATHOLOGIC CALCIFICATIONS: None. OSSEOUS STRUCTURES: 1. Levocurvature of the lumbar spine. OTHER: None. 1. Small right effusion with right base consolidation. . 2. Unremarkable supine and upright views of the abdomen. 3. Hepatomegaly. **This report has been created using voice recognition software. It may contain minor errors which are inherent in voice recognition technology. ** Final report electronically signed by Dr. Melody Joyner on 7/9/2021 9:26 AM    CT GUIDED NEEDLE PLACEMENT    Result Date: 7/9/2021  CT-GUIDED ABSCESS DRAINAGE PERFORMED BY: Misti Duff. Rupali Saha M.D. Bhavna Alden: Fluid collections throughout the abdomen. One is within the right side and one is within the left side of the abdomen. APPROACH: Anterior right side of the abdomen. Anterior left side of the abdomen. CATHETER: 214 Bangladeshi multipurpose drainage catheters. FLUID OBTAINED: Yellow-colored fluid on the right and reddish colored fluid on the left was aspirated from the abdomen. Specimens were sent to the laboratory for further analysis. SEDATION: Versed 1.5 mg and fentanyl 75 mcg , IV; the patient was sedated for 29 minutes during this procedure and monitored with EKG and pulse ox monitoring devices by a registered nurse. Face-to-face time with the patient was 29 minutes. PROCEDURE: Signed informed consent was obtained prior to performing this procedure. The patient was placed on the CT scanner and sedated, as indicated above. ALL CT SCANS AT THIS FACILITY use dose modulation, iterative reconstruction, and/or weight-based dosing when appropriate to reduce radiation dose to as low as reasonably achievable. CT images were initially obtained to determine appropriate puncture sites.  The skin was marked, prepped, and draped in a sterile fashion. Following local anesthesia and utilizing aseptic technique, needles were successfully passed into the fluid collections. A small amount of fluid was aspirated to confirm appropriate needle position. A guidewire was then passed through the needle followed by insertion of progressively larger dilators up to an port Western Melanie size. An 15 Libyan multi-side-hole drainage catheter was then passed over the wire and was coiled within the abscess pocket. The retaining suture was then locked in the standard fashion. Catheter was then hooked to a Prabhu-Close drainage bag and the catheter was flushed several times with sterile saline. The catheter was stabilized to the skin with a Percu-Stay device. A sample of the fluid was sent to laboratory for culture and sensitivity testing. Successful, uncomplicated placement of 14 Libyan drainage catheters into both sides of the abdomen. **This report has been created using voice recognition software. It may contain minor errors which are inherent in voice recognition technology. ** Final report electronically signed by Dr Jaquelin Gay on 7/9/2021 3:38 PM    CT GUIDED NEEDLE PLACEMENT    Result Date: 7/9/2021  CT-GUIDED ABSCESS DRAINAGE PERFORMED BY: Elsa Thibodeaux. STERLING Zhang: Fluid collections throughout the abdomen. One is within the right side and one is within the left side of the abdomen. APPROACH: Anterior right side of the abdomen. Anterior left side of the abdomen. CATHETER: 214 Libyan multipurpose drainage catheters. FLUID OBTAINED: Yellow-colored fluid on the right and reddish colored fluid on the left was aspirated from the abdomen. Specimens were sent to the laboratory for further analysis. SEDATION: Versed 1.5 mg and fentanyl 75 mcg , IV; the patient was sedated for 29 minutes during this procedure and monitored with EKG and pulse ox monitoring devices by a registered nurse.  Face-to-face time with the patient was 34 All CT scans at this facility use dose modulation, iterative reconstruction, and/or weight based dosing when appropriate to reduce the radiation dose to as low as reasonably achievable. CONTRAST: type and amount FINDINGS: LUNG BASES: Moderate bilateral pleural effusions are seen with adjacent atelectasis. LIVER/GALLBLADDER/BILIARY TREE: The liver measures 19.7 cm in craniocaudad dimension. There is redemonstration of a subcentimeter low-density focus in the lateral aspect of the posterior segment of the right lobe of the liver. The gallbladder is unremarkable except for prior cholecystectomy with elevated gallbladder pathology. PANCREAS/SPLEEN: Unremarkable. ADRENAL GLANDS: Unremarkable. KIDNEYS: Wedge-shaped bands of hypoenhancement are seen in both kidney, not present previously, appearance consistent with pyelonephritis. The kidneys overall do not appear particularly swollen at this time. . BOWEL: 1. Nonobstructive. There is diffuse thickening of the ileal loops, extending significantly more than previous, but the degree of thickness is not as severe as previous, particularly the terminal ileum. The colon is collapsed. The walls of the ascending colon and transverse colon appear inflamed and mildly thickened. Thickening of the walls of duodenal is also seen, with enhancement, also changed from previous. FREE AIR/FREE FLUID/INFLAMMATION: Large perihepatic collection with trapped air bubbles seen extending from dome of the liver through to the pelvis, measures approximately 4.5 cm in its largest AP dimension. A second collection is seen in the left lower quadrant, measures approximately 4.7 cm in largest AP dimension, approximately 13.5 cm in craniocaudad dimension and connects across the lower abdomen upper pelvis to the perihepatic collection (coronal images 22-27).  Smaller collections are seen in the pelvis centered at the periphery in a U shaped configuration, best demonstrated in the coronal plane and does not appear communicate with the larger, aforementioned collection. There is fluid throughout the mesentery. LYMPHADENOPATHY: 1. No pathologically enlarged lymph nodes. ABDOMINAL AORTA: Unremarkable. PELVIS: 1. U-shaped abscess centered around the periphery. Mesenteric inflammation. . ABDOMINAL WALL: Diffuse abdominal wall edema, with fluid density in the fascial planes, extending through to the imaged thigh. MUSCULOSKELETAL: Unremarkable lumbar spine and joints of the pelvis. No sign of infection. Levo curve of the lumbar spine. OTHER: None. 1.  Interval development of large perihepatic collection with trapped air bubbles extending from dome of the liver through to the lower abdomen/upper pelvis, measures approximately 4.5 cm in its largest AP dimension. A second collection is seen in the left lower quadrant, measures approximately 4.7 cm in largest AP dimension, approximately 13.5 cm in craniocaudad dimension and connects across the lower abdomen upper pelvis to the perihepatic collection, consistent with abscesses. Smaller collections are seen in the pelvis centered at the periphery in a U shaped configuration, best demonstrated in the coronal plane and does not appear to communicate with the larger, aforementioned collection. 2.  Mesenteric induration and free fluid. 3.  .Moderate bilateral pleural effusions with adjacent atelectasis. **This report has been created using voice recognition software. It may contain minor errors which are inherent in voice recognition technology. ** Final report electronically signed by Dr. Mango Mcdaniel on 7/9/2021 10:53 AM    CT ABSCESS DRAINAGE SOFT TISSUE    Result Date: 7/9/2021  CT-GUIDED ABSCESS DRAINAGE PERFORMED BY: Trini Willson. Carlos Mendoza M.D. Lola Douglas: Fluid collections throughout the abdomen. One is within the right side and one is within the left side of the abdomen. APPROACH: Anterior right side of the abdomen. Anterior left side of the abdomen.  CATHETER: 214 Western Melanie multipurpose drainage catheters. FLUID OBTAINED: Yellow-colored fluid on the right and reddish colored fluid on the left was aspirated from the abdomen. Specimens were sent to the laboratory for further analysis. SEDATION: Versed 1.5 mg and fentanyl 75 mcg , IV; the patient was sedated for 29 minutes during this procedure and monitored with EKG and pulse ox monitoring devices by a registered nurse. Face-to-face time with the patient was 29 minutes. PROCEDURE: Signed informed consent was obtained prior to performing this procedure. The patient was placed on the CT scanner and sedated, as indicated above. ALL CT SCANS AT THIS FACILITY use dose modulation, iterative reconstruction, and/or weight-based dosing when appropriate to reduce radiation dose to as low as reasonably achievable. CT images were initially obtained to determine appropriate puncture sites. The skin was marked, prepped, and draped in a sterile fashion. Following local anesthesia and utilizing aseptic technique, needles were successfully passed into the fluid collections. A small amount of fluid was aspirated to confirm appropriate needle position. A guidewire was then passed through the needle followed by insertion of progressively larger dilators up to an port Western Melanie size. An 15 Andorran multi-side-hole drainage catheter was then passed over the wire and was coiled within the abscess pocket. The retaining suture was then locked in the standard fashion. Catheter was then hooked to a Prabhu-Close drainage bag and the catheter was flushed several times with sterile saline. The catheter was stabilized to the skin with a Percu-Stay device. A sample of the fluid was sent to laboratory for culture and sensitivity testing. Successful, uncomplicated placement of 14 Andorran drainage catheters into both sides of the abdomen. **This report has been created using voice recognition software.   It may contain minor errors which are inherent in voice recognition technology. ** Final report electronically signed by Dr Claudene Patch on 7/9/2021 4:18 PM    CT ABSCESS DRAINAGE SOFT TISSUE    Result Date: 7/9/2021  CT-GUIDED ABSCESS DRAINAGE PERFORMED BY: Gladis Gagnon. Mane Hua M.D. Alfornia Saint: Fluid collections throughout the abdomen. One is within the right side and one is within the left side of the abdomen. APPROACH: Anterior right side of the abdomen. Anterior left side of the abdomen. CATHETER: 214 Cymro multipurpose drainage catheters. FLUID OBTAINED: Yellow-colored fluid on the right and reddish colored fluid on the left was aspirated from the abdomen. Specimens were sent to the laboratory for further analysis. SEDATION: Versed 1.5 mg and fentanyl 75 mcg , IV; the patient was sedated for 29 minutes during this procedure and monitored with EKG and pulse ox monitoring devices by a registered nurse. Face-to-face time with the patient was 29 minutes. PROCEDURE: Signed informed consent was obtained prior to performing this procedure. The patient was placed on the CT scanner and sedated, as indicated above. ALL CT SCANS AT THIS FACILITY use dose modulation, iterative reconstruction, and/or weight-based dosing when appropriate to reduce radiation dose to as low as reasonably achievable. CT images were initially obtained to determine appropriate puncture sites. The skin was marked, prepped, and draped in a sterile fashion. Following local anesthesia and utilizing aseptic technique, needles were successfully passed into the fluid collections. A small amount of fluid was aspirated to confirm appropriate needle position. A guidewire was then passed through the needle followed by insertion of progressively larger dilators up to an port Western Melanie size. An 15 Cymro multi-side-hole drainage catheter was then passed over the wire and was coiled within the abscess pocket. The retaining suture was then locked in the standard fashion.  Catheter was then hooked to a Prabhu-Close drainage bag and the catheter was flushed several times with sterile saline. The catheter was stabilized to the skin with a Percu-Stay device. A sample of the fluid was sent to laboratory for culture and sensitivity testing. Successful, uncomplicated placement of 14 Estonian drainage catheters into both sides of the abdomen. **This report has been created using voice recognition software. It may contain minor errors which are inherent in voice recognition technology. ** Final report electronically signed by Dr Rafael Hall on 7/9/2021 4:18 PM      DVT prophylaxis: [x] Lovenox                                 [] SCDs                                 [] SQ Heparin                                 [] Encourage ambulation           [] Already on Anticoagulation     Code Status: Full Code    Tele:   [] yes             [x] no    Active Hospital Problems    Diagnosis Date Noted    Severe malnutrition ([address] [E43] 07/13/2021     Class: Acute    Crohn's disease of small intestine with abscess ([address] [K50.014]     Intra-abdominal abscess ([address] [K65.1] 07/09/2021       Electronically signed by PRAVEENA Dent CNP on 7/16/2021 at 7:10 AM

## 2021-07-17 LAB
ANION GAP SERPL CALCULATED.3IONS-SCNC: 8 MEQ/L (ref 8–16)
BUN BLDV-MCNC: 10 MG/DL (ref 7–22)
CALCIUM IONIZED: 1.17 MMOL/L (ref 1.12–1.32)
CALCIUM SERPL-MCNC: 8.9 MG/DL (ref 8.5–10.5)
CHLORIDE BLD-SCNC: 104 MEQ/L (ref 98–111)
CO2: 30 MEQ/L (ref 23–33)
CREAT SERPL-MCNC: 0.4 MG/DL (ref 0.4–1.2)
ERYTHROCYTE [DISTWIDTH] IN BLOOD BY AUTOMATED COUNT: 15.4 % (ref 11.5–14.5)
ERYTHROCYTE [DISTWIDTH] IN BLOOD BY AUTOMATED COUNT: 47.5 FL (ref 35–45)
GFR SERPL CREATININE-BSD FRML MDRD: > 90 ML/MIN/1.73M2
GLUCOSE BLD-MCNC: 123 MG/DL (ref 70–108)
GLUCOSE BLD-MCNC: 130 MG/DL (ref 70–108)
GLUCOSE BLD-MCNC: 97 MG/DL (ref 70–108)
GLUCOSE BLD-MCNC: 97 MG/DL (ref 70–108)
GLUCOSE BLD-MCNC: 99 MG/DL (ref 70–108)
HCT VFR BLD CALC: 39 % (ref 37–47)
HEMOGLOBIN: 11.4 GM/DL (ref 12–16)
MAGNESIUM: 1.9 MG/DL (ref 1.6–2.4)
MCH RBC QN AUTO: 24.9 PG (ref 26–33)
MCHC RBC AUTO-ENTMCNC: 29.2 GM/DL (ref 32.2–35.5)
MCV RBC AUTO: 85.3 FL (ref 81–99)
PHOSPHORUS: 4.1 MG/DL (ref 2.4–4.7)
PLATELET # BLD: 370 THOU/MM3 (ref 130–400)
PMV BLD AUTO: 9.2 FL (ref 9.4–12.4)
POTASSIUM SERPL-SCNC: 4.2 MEQ/L (ref 3.5–5.2)
RBC # BLD: 4.57 MILL/MM3 (ref 4.2–5.4)
SODIUM BLD-SCNC: 142 MEQ/L (ref 135–145)
WBC # BLD: 13.6 THOU/MM3 (ref 4.8–10.8)

## 2021-07-17 PROCEDURE — 99232 SBSQ HOSP IP/OBS MODERATE 35: CPT | Performed by: NURSE PRACTITIONER

## 2021-07-17 PROCEDURE — 2500000003 HC RX 250 WO HCPCS: Performed by: SURGERY

## 2021-07-17 PROCEDURE — 84100 ASSAY OF PHOSPHORUS: CPT

## 2021-07-17 PROCEDURE — 85027 COMPLETE CBC AUTOMATED: CPT

## 2021-07-17 PROCEDURE — 99024 POSTOP FOLLOW-UP VISIT: CPT | Performed by: SURGERY

## 2021-07-17 PROCEDURE — 36415 COLL VENOUS BLD VENIPUNCTURE: CPT

## 2021-07-17 PROCEDURE — 6360000002 HC RX W HCPCS: Performed by: NURSE PRACTITIONER

## 2021-07-17 PROCEDURE — 6370000000 HC RX 637 (ALT 250 FOR IP): Performed by: SURGERY

## 2021-07-17 PROCEDURE — 6360000002 HC RX W HCPCS: Performed by: SURGERY

## 2021-07-17 PROCEDURE — 83735 ASSAY OF MAGNESIUM: CPT

## 2021-07-17 PROCEDURE — 2500000003 HC RX 250 WO HCPCS: Performed by: NURSE PRACTITIONER

## 2021-07-17 PROCEDURE — 82948 REAGENT STRIP/BLOOD GLUCOSE: CPT

## 2021-07-17 PROCEDURE — 82330 ASSAY OF CALCIUM: CPT

## 2021-07-17 PROCEDURE — 1200000000 HC SEMI PRIVATE

## 2021-07-17 PROCEDURE — 80048 BASIC METABOLIC PNL TOTAL CA: CPT

## 2021-07-17 RX ORDER — METHYLPREDNISOLONE SODIUM SUCCINATE 40 MG/ML
10 INJECTION, POWDER, LYOPHILIZED, FOR SOLUTION INTRAMUSCULAR; INTRAVENOUS DAILY
Status: COMPLETED | OUTPATIENT
Start: 2021-07-18 | End: 2021-07-19

## 2021-07-17 RX ORDER — DOCUSATE SODIUM 100 MG/1
100 CAPSULE, LIQUID FILLED ORAL ONCE
Status: COMPLETED | OUTPATIENT
Start: 2021-07-17 | End: 2021-07-17

## 2021-07-17 RX ADMIN — DOCUSATE SODIUM 100 MG: 100 CAPSULE, LIQUID FILLED ORAL at 10:37

## 2021-07-17 RX ADMIN — FAMOTIDINE 20 MG: 10 INJECTION INTRAVENOUS at 09:27

## 2021-07-17 RX ADMIN — HYDROCODONE BITARTRATE AND ACETAMINOPHEN 2 TABLET: 5; 325 TABLET ORAL at 20:46

## 2021-07-17 RX ADMIN — METHYLPREDNISOLONE SODIUM SUCCINATE 20 MG: 40 INJECTION, POWDER, FOR SOLUTION INTRAMUSCULAR; INTRAVENOUS at 09:27

## 2021-07-17 RX ADMIN — HYDROCODONE BITARTRATE AND ACETAMINOPHEN 2 TABLET: 5; 325 TABLET ORAL at 01:35

## 2021-07-17 RX ADMIN — HYDROCODONE BITARTRATE AND ACETAMINOPHEN 2 TABLET: 5; 325 TABLET ORAL at 14:56

## 2021-07-17 RX ADMIN — METOCLOPRAMIDE 5 MG: 5 INJECTION, SOLUTION INTRAMUSCULAR; INTRAVENOUS at 01:32

## 2021-07-17 RX ADMIN — METOCLOPRAMIDE 5 MG: 5 INJECTION, SOLUTION INTRAMUSCULAR; INTRAVENOUS at 18:34

## 2021-07-17 RX ADMIN — METRONIDAZOLE 500 MG: 500 INJECTION, SOLUTION INTRAVENOUS at 04:25

## 2021-07-17 RX ADMIN — CALCIUM GLUCONATE: 98 INJECTION, SOLUTION INTRAVENOUS at 18:52

## 2021-07-17 RX ADMIN — FAMOTIDINE 20 MG: 10 INJECTION INTRAVENOUS at 20:35

## 2021-07-17 RX ADMIN — METOCLOPRAMIDE 5 MG: 5 INJECTION, SOLUTION INTRAMUSCULAR; INTRAVENOUS at 09:27

## 2021-07-17 RX ADMIN — HYDROCODONE BITARTRATE AND ACETAMINOPHEN 2 TABLET: 5; 325 TABLET ORAL at 10:37

## 2021-07-17 RX ADMIN — LEVOFLOXACIN 750 MG: 5 INJECTION, SOLUTION INTRAVENOUS at 21:51

## 2021-07-17 RX ADMIN — HYDROCODONE BITARTRATE AND ACETAMINOPHEN 2 TABLET: 5; 325 TABLET ORAL at 06:23

## 2021-07-17 RX ADMIN — METRONIDAZOLE 500 MG: 500 INJECTION, SOLUTION INTRAVENOUS at 20:35

## 2021-07-17 RX ADMIN — METRONIDAZOLE 500 MG: 500 INJECTION, SOLUTION INTRAVENOUS at 12:39

## 2021-07-17 ASSESSMENT — PAIN - FUNCTIONAL ASSESSMENT
PAIN_FUNCTIONAL_ASSESSMENT: ACTIVITIES ARE NOT PREVENTED
PAIN_FUNCTIONAL_ASSESSMENT: PREVENTS OR INTERFERES SOME ACTIVE ACTIVITIES AND ADLS
PAIN_FUNCTIONAL_ASSESSMENT: ACTIVITIES ARE NOT PREVENTED

## 2021-07-17 ASSESSMENT — PAIN DESCRIPTION - PAIN TYPE
TYPE: SURGICAL PAIN
TYPE: ACUTE PAIN
TYPE: SURGICAL PAIN

## 2021-07-17 ASSESSMENT — PAIN DESCRIPTION - LOCATION
LOCATION: ABDOMEN

## 2021-07-17 ASSESSMENT — PAIN SCALES - GENERAL
PAINLEVEL_OUTOF10: 4
PAINLEVEL_OUTOF10: 3
PAINLEVEL_OUTOF10: 2
PAINLEVEL_OUTOF10: 7
PAINLEVEL_OUTOF10: 8
PAINLEVEL_OUTOF10: 7
PAINLEVEL_OUTOF10: 6
PAINLEVEL_OUTOF10: 7
PAINLEVEL_OUTOF10: 5

## 2021-07-17 ASSESSMENT — PAIN DESCRIPTION - DESCRIPTORS
DESCRIPTORS: ACHING;DISCOMFORT;DULL
DESCRIPTORS: ACHING;DISCOMFORT;DULL
DESCRIPTORS: ACHING

## 2021-07-17 ASSESSMENT — PAIN DESCRIPTION - FREQUENCY
FREQUENCY: CONTINUOUS

## 2021-07-17 ASSESSMENT — PAIN DESCRIPTION - PROGRESSION
CLINICAL_PROGRESSION: NOT CHANGED

## 2021-07-17 ASSESSMENT — PAIN DESCRIPTION - ONSET
ONSET: ON-GOING

## 2021-07-17 ASSESSMENT — PAIN SCALES - WONG BAKER
WONGBAKER_NUMERICALRESPONSE: 4
WONGBAKER_NUMERICALRESPONSE: 4

## 2021-07-17 ASSESSMENT — PAIN DESCRIPTION - ORIENTATION
ORIENTATION: RIGHT;LOWER

## 2021-07-17 NOTE — PLAN OF CARE
movement this shift. Problem:   Goal: Adequate urinary output  Outcome: Ongoing  Note: Patient voiding frequently this shift. Problem: Nutrition  Goal: Optimal nutrition therapy  Outcome: Ongoing  Note: Patient receiving continuous PN at 75ML/hr. Problem: Safety:  Goal: Free from accidental physical injury  Description: Free from accidental physical injury  Outcome: Ongoing  Note: Patient bed in lowest position, wheels locked, 2/4 side rails up and alarm on. Call light and belongings within reach. Pathway clear. Nonskid footwear on. Patient rounded on hourly. Fall risk assessment complete. Patient remains free from falls this shift, will continue to monitor. Goal: Free from intentional harm  Description: Free from intentional harm  Outcome: Ongoing  Note: Patient bed in lowest position, wheels locked, 2/4 side rails up and alarm on. Call light and belongings within reach. Pathway clear. Nonskid footwear on. Patient rounded on hourly. Fall risk assessment complete. Patient remains free from falls this shift, will continue to monitor. Problem: Discharge Planning:  Goal: Patients continuum of care needs are met  Description: Patients continuum of care needs are met  Outcome: Ongoing  Note: Patient is from home with . Care plan reviewed with patient and RN. Patient and RN verbalize understanding of the plan of care and contribute to goal setting.

## 2021-07-17 NOTE — PROGRESS NOTES
TPN Follow Up Note    Assessment: Full liquid diet.      Electrolyte Replacement: None    TPN changes for (today) at 1800: None    Re-check BMP, Mg, PO4, iCa 7/18/2021    Armida Keith PharmD, BCPS, BCCCP  7/17/2021 11:16 AM

## 2021-07-17 NOTE — PLAN OF CARE
Problem: Pain:  Goal: Pain level will decrease  Description: Pain level will decrease  7/17/2021 1201 by Nilson Cummings RN  Outcome: Ongoing   Patient pain controlled with medication  Problem: Infection - Surgical Site:  Goal: Will show no infection signs and symptoms  Description: Will show no infection signs and symptoms  7/17/2021 1201 by Nilson Cummings RN  Outcome: Ongoing   Patient without infection, no fever  Problem: GI  Goal: No bowel complications  6/37/0799 1201 by Nilson Cummings RN  Outcome: Ongoing   Patient having bloating, but passing gas, some active bowel sounds, no cramping, no BM yet.    Problem:   Goal: Adequate urinary output  7/17/2021 1201 by Nilson Cummings RN  Outcome: Ongoing   Patient able to void without difficulty  Problem: Safety:  Goal: Free from accidental physical injury  Description: Free from accidental physical injury  7/17/2021 1201 by Nilson Cummings RN  Outcome: Ongoing   Patient able to ambulate without difficulty, gait steady, free from falls  Problem: Discharge Planning:  Goal: Patients continuum of care needs are met  Description: Patients continuum of care needs are met  7/17/2021 1201 by Nilson Cummings RN  Outcome: Ongoing   Patient planning to go home at discharge

## 2021-07-17 NOTE — PROGRESS NOTES
levofloxacin  750 mg Intravenous Q24H    insulin lispro  0-12 Units Subcutaneous Q6H    metroNIDAZOLE  500 mg Intravenous Q8H    famotidine (PEPCID) injection  20 mg Intravenous BID     Continuous Infusions:   PN-Adult  3 IN 1 Central Line (Custom) 75 mL/hr at 21 1748    sodium chloride 30 mL/hr at 21 1753    sodium chloride       PRN Meds:. HYDROmorphone **OR** HYDROmorphone, sodium chloride flush, sodium chloride, ondansetron **OR** ondansetron, HYDROcodone 5 mg - acetaminophen **OR** HYDROcodone 5 mg - acetaminophen, ondansetron **OR** ondansetron  OBJECTIVE   CURRENT VITALS:  height is 4' 11\" (1.499 m) and weight is 90 lb (40.8 kg). Her oral temperature is 97.4 °F (36.3 °C). Her blood pressure is 108/67 and her pulse is 91. Her respiration is 16 and oxygen saturation is 96%. Temperature Range (24h):Temp: 97.4 °F (36.3 °C) Temp  Av °F (36.7 °C)  Min: 97.4 °F (36.3 °C)  Max: 98.5 °F (36.9 °C)  BP Range (77D): Systolic (43XXL), TAX:736 , Min:99 , SFF:713     Diastolic (14ZZV), QML:35, Min:65, Max:74    Pulse Range (24h): Pulse  Av.8  Min: 75  Max: 91  Respiration Range (24h): Resp  Av.3  Min: 16  Max: 18  Current Pulse Ox (24h):  SpO2: 96 %  Pulse Ox Range (24h):  SpO2  Av.8 %  Min: 95 %  Max: 98 %  Oxygen Amount and Delivery: O2 Flow Rate (L/min): 1 L/min  Incentive Spirometry Tx:            GENERAL: alert, cooperative, no distress  SKIN: Skin color, texture, turgor normal. No rashes or lesions. HEENT: Head is normocephalic, atraumatic. NECK: Supple, symmetrical, trachea midline  LUNGS: clear but diminished   HEART: Regular rate and regular rhythm  ABDOMEN: soft, generalized tenderness, less distended, hypoactive bowel sounds present. JAYMIE drain right. Left drain removed and dressing dry. Midline incision clean, dry and intact with steri-strips. NEUROLOGIC: There are no focalizing motor or sensory deficits. CN II-XII are grossly intact.   EXTREMITIES: no cyanosis, no clubbing and no edema. In: 1731.7 [P.O.:250; I.V.:1481.7]  Out: 55 [Drains:55]  Date 07/17/21 0000 - 07/17/21 2359   Shift 7589-6662 0191-0272 6365-4342 24 Hour Total   INTAKE   P.O.(mL/kg/hr) 50(0.2)   50   I. V.(mL/kg) 691. 9(16.9)   691. 9(16.9)   Shift Total(mL/kg) 741. 9(18.2)   741. 9(18.2)   OUTPUT   Drains(mL/kg)  30(0.7)  30(0.7)   Shift Total(mL/kg)  30(0.7)  30(0.7)   Weight (kg) 40.8 40.8 40.8 40.8     LABS     Recent Labs     07/15/21  0614 07/16/21  0630 07/17/21  0553   WBC 16.1* 14.2* 13.6*   HGB 10.8* 10.3* 11.4*   HCT 37.3 35.2* 39.0    312 370    142 142   K 3.8 3.9 4.2    104 104   CO2 29 30 30   BUN 10 10 10   CREATININE 0.3* 0.3* 0.4   MG 1.8 1.8 1.9   PHOS 2.7 3.8 4.1   CALCIUM 8.1* 8.6 8.9      RADIOLOGY     PROCEDURE: XR CHEST 1 VIEW       CLINICAL INFORMATION: 53-year-old female who underwent a right-sided thoracentesis.       COMPARISON: Chest x-ray 7/16/2021.       TECHNIQUE: AP upright view of the chest was obtained.       FINDINGS:    The right-sided pleural effusion has decreased in size.       There is no evidence of a pneumothorax.       The cardiac silhouette and pulmonary vasculature are within normal limits.       Visualized portions of the upper abdomen are within normal limits.       The osseous structures are intact. No acute fractures or suspicious osseous lesions.           Impression   No pneumothorax following a right-sided thoracentesis.           **This report has been created using voice recognition software. It may contain minor errors which are inherent in voice recognition technology. **       Final report electronically signed by Dr Parker Zavaleta on 7/16/2021 12:11 PM     PROCEDURE: US THORACENTESIS       CLINICAL INFORMATION: 53-year-old female with a right-sided pleural effusion .       COMPARISON: No prior study.       PROCEDURE:       Physician performing procedure: Dr Mirna Sal   Informed consent signed: Yes   Local Anesthetic: 2%   Specimen volume: 70 ml   Catheter: 19 ga 5 Sami   Aspirated pleural fluid volume: 0.7 liters   Aspirated pleural fluid color: Yellow   Complications: None observed       The benefits and the risk of the procedure were explained to the patient. The patient was given an opportunity to ask questions. Signed consent was obtained. Limited ultrasound exam of the right chest showed  large  amount of pleural fluid.  Using ultrasound guidance, a site was marked on the skin.           The right side of the posterior chest was prepped and draped using the usual sterile technique and the skin was anesthetized with 2 percent lidocaine.        A 5 Upper sorbian one-step catheter was introduced to the right pleural space. 0.7 L of yellow-colored fluid drained. The catheter was then removed.       The patient tolerated the procedure well with no complications. Specimen sent for laboratory studies as requested.           Impression       Successful ultrasound-guided thoracentesis with  0.7 L of fluid drained.                   **This report has been created using voice recognition software. It may contain minor errors which are inherent in voice recognition technology. **           Final report electronically signed by Dr Champ Dunlap on 7/16/2021 1:06 PM     2 view chest x-ray       Comparison:  CR,SR  - XR CHEST STANDARD (2 VW)  - 07/15/2021 12:47 PM EDT       Findings: Moderate to large right pleural effusion is unchanged. Small left pleural effusion. Partial atelectasis or consolidation left medial lung base unchanged. Heart size is normal.   No acute fracture.           Impression   Unchanged effusions.       This document has been electronically signed by:  Jacobo Olmedo MD on 07/16/2021 08:08 AM     PROCEDURE: XR CHEST (2 VW)       CLINICAL INFORMATION: follow up imaging bilateral pleural effusions       COMPARISON: 7/19/2021       TECHNIQUE: PA and lateral views of the chest were obtained.             Impression   1. Normal heart size. Tiny effusion left side. Moderate-sized effusion right side. Moderate right basilar atelectasis/pneumonia. Mild left basilar atelectasis/pneumonia. 2. Overall appearance of chest has worsened since prior.               **This report has been created using voice recognition software.  It may contain minor errors which are inherent in voice recognition technology. **       Final report electronically signed by Dr. Darcy Mijares on 7/15/2021 12:53 PM     Electronically signed by Consuelo Fabian MD on 7/17/2021 at 9:25 AM

## 2021-07-17 NOTE — PROGRESS NOTES
Hospitalist Progress Note    Patient:  Pennie Lockwood      Unit/Bed:5K-11/011-A    YOB: 1990    MRN: [id number]       Acct: [de-identified]     PCP: Iman Mendoza MD    Date of Admission: 7/9/2021    Assessment/Plan:    1. Crohn's disease with acute exacerbation--per GI; on Solu-Medrol 20 mg daily from 7/15 x 3 doses and will decrease to 10 mg starting 7/18 x 2 doses; Humira on hold since April; needs a 3-week follow-up with Dr. Herson Childs  2. Large intra-abdominal abscess status post laparotomy, washout and drain placement on 7/13/2021--per surgery; Levaquin 7/12; Flagyl 7/9; on PPN and tolerating; appreciate ID input  3. Moderate right pleural effusion S/P right thoracentesis on 7/16 with 700 ml--not in any distress, on room air  4. Leukocytosis--patient is on steroids, she remains afebrile, trending down, monitor  5. Chronic normocytic anemia--stable  6. Severe malnutrition--per dietitian    Expected discharge date: Pending clinical course    Disposition:    [x] Home       [] TCU       [] Rehab       [] Psych       [] SNF       [] [place]       [] Other-    Chief Complaint: Abdominal pain    Hospital Course: Per initial H&P: \"The patient is a 33 y. o. female who presented to [address] [place] [address] with abd pain. Patient was just discharged from our service last week. She had acute Crohn's exacerbation. She was discharged on 7/4/21 on 20 mg BID  PO Prednison. Patient was supposed to start on Humira yesterday. Patient reported worsening abdominal pain on 7/9/21 early AM. She presented to ED where CT showed large perihepatic and smaller pelvic fluid collections.  S/P 2 drains placement by IR\"    7/14--> had exploratory laparotomy along with washout and drain placement on 7/13; hemodynamically stable    7/15--> hemodynamically stable and afebrile, surgery repeated a chest x-ray today shows a moderate sized right pleural effusion along with moderate right basilar atelectasis/pneumonia and mild left basilar atelectasis/pneumonia~she is on Levaquin and again she is on room air and afebrile     7/16--> chest x-ray reviewed and shows moderate to large right effusion and this was discussed with the patient and she agrees to thoracentesis; diet been advanced to full liquid, no BM yet    7/17--> ambulated in iraheta well yesterday, had thoracentesis done yesterday with 700 mL drained; weaning steroids     Subjective (past 24 hours): Sitting up in bed, relates to feeling a lot better today, she is passing gas, relates to generalized abdominal pain 6 out of 10, denies nausea, no bowel movement yet    Medications:  Reviewed    Infusion Medications    PN-Adult  3 IN 1 Central Line (Custom) 75 mL/hr at 07/16/21 1748    sodium chloride 30 mL/hr at 07/16/21 1753    sodium chloride       Scheduled Medications    metoclopramide  5 mg Intravenous Q8H    methylPREDNISolone  20 mg Intravenous Daily    sodium chloride flush  5-40 mL Intravenous 2 times per day    enoxaparin  40 mg Subcutaneous Daily    levofloxacin  750 mg Intravenous Q24H    insulin lispro  0-12 Units Subcutaneous Q6H    metroNIDAZOLE  500 mg Intravenous Q8H    famotidine (PEPCID) injection  20 mg Intravenous BID     PRN Meds: HYDROmorphone **OR** HYDROmorphone, sodium chloride flush, sodium chloride, ondansetron **OR** ondansetron, HYDROcodone 5 mg - acetaminophen **OR** HYDROcodone 5 mg - acetaminophen, ondansetron **OR** ondansetron      Intake/Output Summary (Last 24 hours) at 7/17/2021 0718  Last data filed at 7/17/2021 0330  Gross per 24 hour   Intake 2868.74 ml   Output 110 ml   Net 2758.74 ml       Diet:  ADULT DIET;  Full Liquid  PN-Adult 3 IN 1 Peripheral Line (Custom)    Exam:  /70   Pulse 75   Temp 98.3 °F (36.8 °C) (Oral)   Resp 18   Ht 4' 11\" (1.499 m)   Wt 90 lb (40.8 kg)   LMP 07/09/2021   SpO2 98%   BMI 18.18 kg/m²     General appearance: No apparent distress, appears stated age and Interstitial and peribronchial thickening in the lower lobes. PNEUMOTHORAX: None. OSSEOUS STRUCTURES: 1.  No acute osseous abnormality. OTHER: None. ABDOMEN: POSTOPERATIVE CHANGES: None. LINES/TUBES/MECHANICAL DEVICES: None. BOWEL GAS PATTERN: 1. Except for air in the stomach, the bowel is gasless. FREE AIR: None. MASS SHADOWS: The liver appears large. PATHOLOGIC CALCIFICATIONS: None. OSSEOUS STRUCTURES: 1. Levocurvature of the lumbar spine. OTHER: None. 1. Small right effusion with right base consolidation. . 2. Unremarkable supine and upright views of the abdomen. 3. Hepatomegaly. **This report has been created using voice recognition software. It may contain minor errors which are inherent in voice recognition technology. ** Final report electronically signed by Dr. Nadja Richmond on 7/9/2021 9:26 AM    CT GUIDED NEEDLE PLACEMENT    Result Date: 7/9/2021  CT-GUIDED ABSCESS DRAINAGE PERFORMED BY: Emily Ordoñez. Sonido Morales M.D. Brianna Grand: Fluid collections throughout the abdomen. One is within the right side and one is within the left side of the abdomen. APPROACH: Anterior right side of the abdomen. Anterior left side of the abdomen. CATHETER: 214 Mexican multipurpose drainage catheters. FLUID OBTAINED: Yellow-colored fluid on the right and reddish colored fluid on the left was aspirated from the abdomen. Specimens were sent to the laboratory for further analysis. SEDATION: Versed 1.5 mg and fentanyl 75 mcg , IV; the patient was sedated for 29 minutes during this procedure and monitored with EKG and pulse ox monitoring devices by a registered nurse. Face-to-face time with the patient was 29 minutes. PROCEDURE: Signed informed consent was obtained prior to performing this procedure. The patient was placed on the CT scanner and sedated, as indicated above.  ALL CT SCANS AT THIS FACILITY use dose modulation, iterative reconstruction, and/or weight-based dosing when appropriate to reduce radiation dose to as low as reasonably achievable. CT images were initially obtained to determine appropriate puncture sites. The skin was marked, prepped, and draped in a sterile fashion. Following local anesthesia and utilizing aseptic technique, needles were successfully passed into the fluid collections. A small amount of fluid was aspirated to confirm appropriate needle position. A guidewire was then passed through the needle followed by insertion of progressively larger dilators up to an port Western Melanie size. An 15 Lebanese multi-side-hole drainage catheter was then passed over the wire and was coiled within the abscess pocket. The retaining suture was then locked in the standard fashion. Catheter was then hooked to a Prabhu-Close drainage bag and the catheter was flushed several times with sterile saline. The catheter was stabilized to the skin with a Percu-Stay device. A sample of the fluid was sent to laboratory for culture and sensitivity testing. Successful, uncomplicated placement of 14 Lebanese drainage catheters into both sides of the abdomen. **This report has been created using voice recognition software. It may contain minor errors which are inherent in voice recognition technology. ** Final report electronically signed by Dr Jorgito Bender on 7/9/2021 3:38 PM    CT GUIDED NEEDLE PLACEMENT    Result Date: 7/9/2021  CT-GUIDED ABSCESS DRAINAGE PERFORMED BY: Joselin Anand. Hamlet Davis M.D. Faith Byrd: Fluid collections throughout the abdomen. One is within the right side and one is within the left side of the abdomen. APPROACH: Anterior right side of the abdomen. Anterior left side of the abdomen. CATHETER: 214 Lebanese multipurpose drainage catheters. FLUID OBTAINED: Yellow-colored fluid on the right and reddish colored fluid on the left was aspirated from the abdomen. Specimens were sent to the laboratory for further analysis.  SEDATION: Versed 1.5 mg and fentanyl 75 mcg , IV; the patient was sedated for 29 minutes during this procedure and monitored with EKG and pulse ox monitoring devices by a registered nurse. Face-to-face time with the patient was 29 minutes. PROCEDURE: Signed informed consent was obtained prior to performing this procedure. The patient was placed on the CT scanner and sedated, as indicated above. ALL CT SCANS AT THIS FACILITY use dose modulation, iterative reconstruction, and/or weight-based dosing when appropriate to reduce radiation dose to as low as reasonably achievable. CT images were initially obtained to determine appropriate puncture sites. The skin was marked, prepped, and draped in a sterile fashion. Following local anesthesia and utilizing aseptic technique, needles were successfully passed into the fluid collections. A small amount of fluid was aspirated to confirm appropriate needle position. A guidewire was then passed through the needle followed by insertion of progressively larger dilators up to an port Western Melanie size. An 15 Faroese multi-side-hole drainage catheter was then passed over the wire and was coiled within the abscess pocket. The retaining suture was then locked in the standard fashion. Catheter was then hooked to a Prabhu-Close drainage bag and the catheter was flushed several times with sterile saline. The catheter was stabilized to the skin with a Percu-Stay device. A sample of the fluid was sent to laboratory for culture and sensitivity testing. Successful, uncomplicated placement of 14 Faroese drainage catheters into both sides of the abdomen. **This report has been created using voice recognition software. It may contain minor errors which are inherent in voice recognition technology. ** Final report electronically signed by Dr Shaan Black on 7/9/2021 3:38 PM    CT ABDOMEN PELVIS W IV CONTRAST Additional Contrast? None    Result Date: 7/9/2021  PROCEDURE: CT ABDOMEN PELVIS W IV CONTRAST CLINICAL INFORMATION: diffuse abdominal pain COMPARISON: CT abdomen and pelvis June 29, 2021 TECHNIQUE: 5 mm axial imaging through the abdomen and pelvis with IV contrast.  Coronal and sagittal reconstructions were performed. All CT scans at this facility use dose modulation, iterative reconstruction, and/or weight based dosing when appropriate to reduce the radiation dose to as low as reasonably achievable. CONTRAST: type and amount FINDINGS: LUNG BASES: Moderate bilateral pleural effusions are seen with adjacent atelectasis. LIVER/GALLBLADDER/BILIARY TREE: The liver measures 19.7 cm in craniocaudad dimension. There is redemonstration of a subcentimeter low-density focus in the lateral aspect of the posterior segment of the right lobe of the liver. The gallbladder is unremarkable except for prior cholecystectomy with elevated gallbladder pathology. PANCREAS/SPLEEN: Unremarkable. ADRENAL GLANDS: Unremarkable. KIDNEYS: Wedge-shaped bands of hypoenhancement are seen in both kidney, not present previously, appearance consistent with pyelonephritis. The kidneys overall do not appear particularly swollen at this time. . BOWEL: 1. Nonobstructive. There is diffuse thickening of the ileal loops, extending significantly more than previous, but the degree of thickness is not as severe as previous, particularly the terminal ileum. The colon is collapsed. The walls of the ascending colon and transverse colon appear inflamed and mildly thickened. Thickening of the walls of duodenal is also seen, with enhancement, also changed from previous. FREE AIR/FREE FLUID/INFLAMMATION: Large perihepatic collection with trapped air bubbles seen extending from dome of the liver through to the pelvis, measures approximately 4.5 cm in its largest AP dimension. A second collection is seen in the left lower quadrant, measures approximately 4.7 cm in largest AP dimension, approximately 13.5 cm in craniocaudad dimension and connects across the lower abdomen upper pelvis to the perihepatic collection (coronal images 22-27).  Smaller collections are seen in the pelvis centered at the periphery in a U shaped configuration, best demonstrated in the coronal plane and does not appear communicate with the larger, aforementioned collection. There is fluid throughout the mesentery. LYMPHADENOPATHY: 1. No pathologically enlarged lymph nodes. ABDOMINAL AORTA: Unremarkable. PELVIS: 1. U-shaped abscess centered around the periphery. Mesenteric inflammation. . ABDOMINAL WALL: Diffuse abdominal wall edema, with fluid density in the fascial planes, extending through to the imaged thigh. MUSCULOSKELETAL: Unremarkable lumbar spine and joints of the pelvis. No sign of infection. Levo curve of the lumbar spine. OTHER: None. 1.  Interval development of large perihepatic collection with trapped air bubbles extending from dome of the liver through to the lower abdomen/upper pelvis, measures approximately 4.5 cm in its largest AP dimension. A second collection is seen in the left lower quadrant, measures approximately 4.7 cm in largest AP dimension, approximately 13.5 cm in craniocaudad dimension and connects across the lower abdomen upper pelvis to the perihepatic collection, consistent with abscesses. Smaller collections are seen in the pelvis centered at the periphery in a U shaped configuration, best demonstrated in the coronal plane and does not appear to communicate with the larger, aforementioned collection. 2.  Mesenteric induration and free fluid. 3.  .Moderate bilateral pleural effusions with adjacent atelectasis. **This report has been created using voice recognition software. It may contain minor errors which are inherent in voice recognition technology. ** Final report electronically signed by Dr. Vinita Matt on 7/9/2021 10:53 AM    CT ABSCESS DRAINAGE SOFT TISSUE    Result Date: 7/9/2021  CT-GUIDED ABSCESS DRAINAGE PERFORMED BY: STERLING Chambers Min: Fluid collections throughout the abdomen.  One is within the right side and one is within the left side of the abdomen. APPROACH: Anterior right side of the abdomen. Anterior left side of the abdomen. CATHETER: 214 Latvian multipurpose drainage catheters. FLUID OBTAINED: Yellow-colored fluid on the right and reddish colored fluid on the left was aspirated from the abdomen. Specimens were sent to the laboratory for further analysis. SEDATION: Versed 1.5 mg and fentanyl 75 mcg , IV; the patient was sedated for 29 minutes during this procedure and monitored with EKG and pulse ox monitoring devices by a registered nurse. Face-to-face time with the patient was 29 minutes. PROCEDURE: Signed informed consent was obtained prior to performing this procedure. The patient was placed on the CT scanner and sedated, as indicated above. ALL CT SCANS AT THIS FACILITY use dose modulation, iterative reconstruction, and/or weight-based dosing when appropriate to reduce radiation dose to as low as reasonably achievable. CT images were initially obtained to determine appropriate puncture sites. The skin was marked, prepped, and draped in a sterile fashion. Following local anesthesia and utilizing aseptic technique, needles were successfully passed into the fluid collections. A small amount of fluid was aspirated to confirm appropriate needle position. A guidewire was then passed through the needle followed by insertion of progressively larger dilators up to an port Western Melanie size. An 15 Latvian multi-side-hole drainage catheter was then passed over the wire and was coiled within the abscess pocket. The retaining suture was then locked in the standard fashion. Catheter was then hooked to a Prabhu-Close drainage bag and the catheter was flushed several times with sterile saline. The catheter was stabilized to the skin with a Percu-Stay device. A sample of the fluid was sent to laboratory for culture and sensitivity testing.      Successful, uncomplicated placement of 14 Latvian drainage catheters into both sides of the abdomen. **This report has been created using voice recognition software. It may contain minor errors which are inherent in voice recognition technology. ** Final report electronically signed by Dr Vaishali Tobar on 7/9/2021 4:18 PM    CT ABSCESS DRAINAGE SOFT TISSUE    Result Date: 7/9/2021  CT-GUIDED ABSCESS DRAINAGE PERFORMED BY: Megan Miller. Yrn Malhotra M.D. Janet Peterson: Fluid collections throughout the abdomen. One is within the right side and one is within the left side of the abdomen. APPROACH: Anterior right side of the abdomen. Anterior left side of the abdomen. CATHETER: 214 Bermudian multipurpose drainage catheters. FLUID OBTAINED: Yellow-colored fluid on the right and reddish colored fluid on the left was aspirated from the abdomen. Specimens were sent to the laboratory for further analysis. SEDATION: Versed 1.5 mg and fentanyl 75 mcg , IV; the patient was sedated for 29 minutes during this procedure and monitored with EKG and pulse ox monitoring devices by a registered nurse. Face-to-face time with the patient was 29 minutes. PROCEDURE: Signed informed consent was obtained prior to performing this procedure. The patient was placed on the CT scanner and sedated, as indicated above. ALL CT SCANS AT THIS FACILITY use dose modulation, iterative reconstruction, and/or weight-based dosing when appropriate to reduce radiation dose to as low as reasonably achievable. CT images were initially obtained to determine appropriate puncture sites. The skin was marked, prepped, and draped in a sterile fashion. Following local anesthesia and utilizing aseptic technique, needles were successfully passed into the fluid collections. A small amount of fluid was aspirated to confirm appropriate needle position. A guidewire was then passed through the needle followed by insertion of progressively larger dilators up to an port Western Melanie size.  An 15 Bermudian multi-side-hole drainage catheter was then passed over the wire and was coiled within the abscess pocket. The retaining suture was then locked in the standard fashion. Catheter was then hooked to a Prabhu-Close drainage bag and the catheter was flushed several times with sterile saline. The catheter was stabilized to the skin with a Percu-Stay device. A sample of the fluid was sent to laboratory for culture and sensitivity testing. Successful, uncomplicated placement of 14 Palauan drainage catheters into both sides of the abdomen. **This report has been created using voice recognition software. It may contain minor errors which are inherent in voice recognition technology. ** Final report electronically signed by Dr Lucy Grossman on 7/9/2021 4:18 PM      DVT prophylaxis: [x] Lovenox                                 [] SCDs                                 [] SQ Heparin                                 [] Encourage ambulation           [] Already on Anticoagulation     Code Status: Full Code    Tele:   [] yes             [x] no    Active Hospital Problems    Diagnosis Date Noted    Severe malnutrition ([facility] [address] [E43] 07/13/2021     Class: Acute    Crohn's disease of small intestine with abscess ([facility] [address] [K50.014]     Intra-abdominal abscess ([address] [K65.1] 07/09/2021       Electronically signed by PRAVEENA Smith CNP on 7/17/2021 at 7:18 AM

## 2021-07-18 LAB
ANION GAP SERPL CALCULATED.3IONS-SCNC: 9 MEQ/L (ref 8–16)
BUN BLDV-MCNC: 11 MG/DL (ref 7–22)
CALCIUM IONIZED: 1.16 MMOL/L (ref 1.12–1.32)
CALCIUM SERPL-MCNC: 8.8 MG/DL (ref 8.5–10.5)
CHLORIDE BLD-SCNC: 103 MEQ/L (ref 98–111)
CO2: 29 MEQ/L (ref 23–33)
CREAT SERPL-MCNC: 0.3 MG/DL (ref 0.4–1.2)
GFR SERPL CREATININE-BSD FRML MDRD: > 90 ML/MIN/1.73M2
GLUCOSE BLD-MCNC: 102 MG/DL (ref 70–108)
GLUCOSE BLD-MCNC: 133 MG/DL (ref 70–108)
GLUCOSE BLD-MCNC: 97 MG/DL (ref 70–108)
GLUCOSE BLD-MCNC: 99 MG/DL (ref 70–108)
MAGNESIUM: 1.9 MG/DL (ref 1.6–2.4)
PHOSPHORUS: 4.2 MG/DL (ref 2.4–4.7)
POTASSIUM SERPL-SCNC: 4 MEQ/L (ref 3.5–5.2)
SODIUM BLD-SCNC: 141 MEQ/L (ref 135–145)

## 2021-07-18 PROCEDURE — 6370000000 HC RX 637 (ALT 250 FOR IP): Performed by: SURGERY

## 2021-07-18 PROCEDURE — 80048 BASIC METABOLIC PNL TOTAL CA: CPT

## 2021-07-18 PROCEDURE — 1200000000 HC SEMI PRIVATE

## 2021-07-18 PROCEDURE — 2500000003 HC RX 250 WO HCPCS: Performed by: SURGERY

## 2021-07-18 PROCEDURE — 6360000002 HC RX W HCPCS: Performed by: SURGERY

## 2021-07-18 PROCEDURE — 6360000002 HC RX W HCPCS: Performed by: NURSE PRACTITIONER

## 2021-07-18 PROCEDURE — 84100 ASSAY OF PHOSPHORUS: CPT

## 2021-07-18 PROCEDURE — 36415 COLL VENOUS BLD VENIPUNCTURE: CPT

## 2021-07-18 PROCEDURE — 82948 REAGENT STRIP/BLOOD GLUCOSE: CPT

## 2021-07-18 PROCEDURE — 99232 SBSQ HOSP IP/OBS MODERATE 35: CPT | Performed by: NURSE PRACTITIONER

## 2021-07-18 PROCEDURE — 2580000003 HC RX 258: Performed by: SURGERY

## 2021-07-18 PROCEDURE — 82330 ASSAY OF CALCIUM: CPT

## 2021-07-18 PROCEDURE — 83735 ASSAY OF MAGNESIUM: CPT

## 2021-07-18 PROCEDURE — 99024 POSTOP FOLLOW-UP VISIT: CPT | Performed by: SURGERY

## 2021-07-18 RX ADMIN — METRONIDAZOLE 500 MG: 500 INJECTION, SOLUTION INTRAVENOUS at 12:03

## 2021-07-18 RX ADMIN — METOCLOPRAMIDE 5 MG: 5 INJECTION, SOLUTION INTRAMUSCULAR; INTRAVENOUS at 01:13

## 2021-07-18 RX ADMIN — HYDROCODONE BITARTRATE AND ACETAMINOPHEN 2 TABLET: 5; 325 TABLET ORAL at 09:40

## 2021-07-18 RX ADMIN — HYDROCODONE BITARTRATE AND ACETAMINOPHEN 2 TABLET: 5; 325 TABLET ORAL at 21:35

## 2021-07-18 RX ADMIN — FAMOTIDINE 20 MG: 10 INJECTION INTRAVENOUS at 20:18

## 2021-07-18 RX ADMIN — METRONIDAZOLE 500 MG: 500 INJECTION, SOLUTION INTRAVENOUS at 20:16

## 2021-07-18 RX ADMIN — FAMOTIDINE 20 MG: 10 INJECTION INTRAVENOUS at 09:40

## 2021-07-18 RX ADMIN — METHYLPREDNISOLONE SODIUM SUCCINATE 10 MG: 40 INJECTION, POWDER, FOR SOLUTION INTRAMUSCULAR; INTRAVENOUS at 09:39

## 2021-07-18 RX ADMIN — METRONIDAZOLE 500 MG: 500 INJECTION, SOLUTION INTRAVENOUS at 04:06

## 2021-07-18 RX ADMIN — LEVOFLOXACIN 750 MG: 5 INJECTION, SOLUTION INTRAVENOUS at 21:36

## 2021-07-18 RX ADMIN — SODIUM CHLORIDE: 9 INJECTION, SOLUTION INTRAVENOUS at 09:39

## 2021-07-18 RX ADMIN — CALCIUM GLUCONATE: 98 INJECTION, SOLUTION INTRAVENOUS at 18:48

## 2021-07-18 RX ADMIN — HYDROCODONE BITARTRATE AND ACETAMINOPHEN 2 TABLET: 5; 325 TABLET ORAL at 17:17

## 2021-07-18 RX ADMIN — HYDROCODONE BITARTRATE AND ACETAMINOPHEN 2 TABLET: 5; 325 TABLET ORAL at 04:09

## 2021-07-18 ASSESSMENT — PAIN SCALES - WONG BAKER
WONGBAKER_NUMERICALRESPONSE: 4
WONGBAKER_NUMERICALRESPONSE: 4

## 2021-07-18 ASSESSMENT — PAIN SCALES - GENERAL
PAINLEVEL_OUTOF10: 7
PAINLEVEL_OUTOF10: 7
PAINLEVEL_OUTOF10: 2
PAINLEVEL_OUTOF10: 7
PAINLEVEL_OUTOF10: 5
PAINLEVEL_OUTOF10: 7

## 2021-07-18 ASSESSMENT — PAIN DESCRIPTION - ORIENTATION
ORIENTATION: RIGHT;LOWER
ORIENTATION: RIGHT;LOWER

## 2021-07-18 ASSESSMENT — PAIN DESCRIPTION - ONSET
ONSET: ON-GOING
ONSET: ON-GOING

## 2021-07-18 ASSESSMENT — PAIN DESCRIPTION - LOCATION
LOCATION: ABDOMEN
LOCATION: ABDOMEN

## 2021-07-18 ASSESSMENT — PAIN DESCRIPTION - FREQUENCY
FREQUENCY: CONTINUOUS
FREQUENCY: CONTINUOUS

## 2021-07-18 ASSESSMENT — PAIN DESCRIPTION - PROGRESSION
CLINICAL_PROGRESSION: NOT CHANGED

## 2021-07-18 ASSESSMENT — PAIN - FUNCTIONAL ASSESSMENT: PAIN_FUNCTIONAL_ASSESSMENT: PREVENTS OR INTERFERES SOME ACTIVE ACTIVITIES AND ADLS

## 2021-07-18 ASSESSMENT — PAIN DESCRIPTION - PAIN TYPE
TYPE: SURGICAL PAIN
TYPE: SURGICAL PAIN

## 2021-07-18 ASSESSMENT — PAIN DESCRIPTION - DESCRIPTORS
DESCRIPTORS: ACHING;DISCOMFORT
DESCRIPTORS: ACHING;DISCOMFORT

## 2021-07-18 NOTE — PROGRESS NOTES
TPN Follow Up Note    Assessment: Regular diet started today. Planning to wean off TPN Monday.      Electrolyte Replacement: None    TPN changes for (today) at 1800: None    Re-check BMP, Mg, PO4, iCa 7/19 AM    Sanket Lacey PharmD, BCPS, BCCCP  7/18/2021 8:10 AM

## 2021-07-18 NOTE — PLAN OF CARE
Problem: Pain:  Goal: Pain level will decrease  Description: Pain level will decrease  Outcome: Ongoing   Pain Assessment: 0-10  Pain Level: 2   Patient's Stated Pain Goal: 3   Is pain goal met at this time? Yes   Pt is receiving 2 Norco tabs as needed and pt appears to get good relief from medication. Non-Pharmaceutical Pain Intervention(s): Cold applied, Emotional support, Relaxation techniques, Repositioned, Rest, Ambulation/Increased Activity   Problem: Discharge Planning:  Goal: Patients continuum of care needs are met  Description: Patients continuum of care needs are met  Outcome: Ongoing   Discharge date possibly tomorrow and pt will return to home with . Problem: Safety:  Goal: Free from accidental physical injury  Description: Free from accidental physical injury  Outcome: Ongoing   All fall precautions in place. Bed in low position, alarm activated and appropriate use of call light. Problem: Nutrition  Goal: Optimal nutrition therapy  Outcome: Ongoing   Will continue to encourage meals, still receiving PN at 75ml/hr continuous. Problem: GI  Goal: Bowel movement at least every other day  Outcome: Ongoing   Patient has had two solid BM so far during shift, will continue to monitor and reassess. Care plan reviewed with patient. Patient verbalizes understanding of the plan of care and contributes to goal setting.

## 2021-07-18 NOTE — PROGRESS NOTES
Hospitalist Progress Note    Patient:  Tosha Morgan      Unit/Bed:5K-11/011-A    YOB: 1990    MRN: [id number]       Acct: [de-identified]     PCP: Shannan Pascual MD    Date of Admission: 7/9/2021    Assessment/Plan:    1. Crohn's disease with acute exacerbation--per GI; on Solu-Medrol  10 mg starting 7/18 x 2 doses; Humira on hold since April; needs a 3-week follow-up with Dr. Carli Fuentes  2. Large intra-abdominal abscess status post laparotomy, washout and drain placement on 7/13/2021--per surgery; Levaquin 7/12; Flagyl 7/9; on PPN and tolerating~plan for surgery is to advance to regular diet today and wean off PPN tomorrow; appreciate ID input  3. Moderate right pleural effusion S/P right thoracentesis on 7/16 with 700 ml--not in any distress, on room air  4. Leukocytosis--patient is on steroids, she remains afebrile, trending down, monitor  5. Chronic normocytic anemia--stable  6. Severe malnutrition--per dietitian    Expected discharge date: Pending clinical course    Disposition:    [x] Home       [] TCU       [] Rehab       [] Psych       [] SNF       [] [place]       [] Other-    Chief Complaint: Abdominal pain    Hospital Course: Per initial H&P: \"The patient is a 33 y. o. female who presented to [facility] with abd pain. Patient was just discharged from our service last week. She had acute Crohn's exacerbation. She was discharged on 7/4/21 on 20 mg BID  PO Prednison. Patient was supposed to start on Humira yesterday. Patient reported worsening abdominal pain on 7/9/21 early AM. She presented to ED where CT showed large perihepatic and smaller pelvic fluid collections.  S/P 2 drains placement by IR\"    7/14--> had exploratory laparotomy along with washout and drain placement on 7/13; hemodynamically stable    7/15--> hemodynamically stable and afebrile, surgery repeated a chest x-ray today shows a moderate sized right pleural effusion along with moderate right basilar atelectasis/pneumonia and mild left basilar atelectasis/pneumonia~she is on Levaquin and again she is on room air and afebrile     7/16--> chest x-ray reviewed and shows moderate to large right effusion and this was discussed with the patient and she agrees to thoracentesis; diet been advanced to full liquid, no BM yet    7/17--> ambulated in [place] well yesterday, had thoracentesis done yesterday with 700 mL drained; weaning steroids    7/18--> surgery note reviewed and advancing to regular diet today continue with the PPN today and plan to wean it off tomorrow; plan to remove JAYMIE drain today per surgery     Subjective (past 24 hours): Sitting up in bed, relates to feeling a lot better today, she is having stools, minimal abdominal pain, overall states she feels much better, looks and acts so much better, walking in the halls well yesterday    Medications:  Reviewed    Infusion Medications    PN-Adult  3 IN 1 Central Line (Custom) 75 mL/hr at 07/17/21 1852    sodium chloride 30 mL/hr at 07/16/21 1753    sodium chloride       Scheduled Medications    methylPREDNISolone  10 mg Intravenous Daily    metoclopramide  5 mg Intravenous Q8H    sodium chloride flush  5-40 mL Intravenous 2 times per day    enoxaparin  40 mg Subcutaneous Daily    levofloxacin  750 mg Intravenous Q24H    insulin lispro  0-12 Units Subcutaneous Q6H    metroNIDAZOLE  500 mg Intravenous Q8H    famotidine (PEPCID) injection  20 mg Intravenous BID     PRN Meds: HYDROmorphone **OR** HYDROmorphone, sodium chloride flush, sodium chloride, ondansetron **OR** ondansetron, HYDROcodone 5 mg - acetaminophen **OR** HYDROcodone 5 mg - acetaminophen, ondansetron **OR** ondansetron      Intake/Output Summary (Last 24 hours) at 7/18/2021 6300  Last data filed at 7/18/2021 0450  Gross per 24 hour   Intake 1323.15 ml   Output 55 ml   Net 1268.15 ml       Diet:  PN-Adult 3 IN 1 Peripheral Line (Custom)  ADULT DIET;  Regular    Exam:  /73 Pulse 68   Temp (!) 68 °F (20 °C) (Oral)   Resp 16   Ht 4' 11\" (1.499 m)   Wt 90 lb (40.8 kg)   LMP 07/09/2021   SpO2 98%   BMI 18.18 kg/m²     General appearance: No apparent distress, appears stated age and cooperative. HEENT: Pupils equal, round, and reactive to light. Conjunctivae/corneas clear. Neck: Supple, with full range of motion. No jugular venous distention. Trachea midline. Respiratory:  Normal respiratory effort. Clear to auscultation   Cardiovascular: Regular rate and rhythm with normal S1/S2 without murmurs, rubs or gallops. Abdomen: Surgical site and generalized tenderness; drains noted  Musculoskeletal: passive and active ROM x 4 extremities. Skin: Skin color, texture, turgor normal.    Neurologic:  Neurovascularly intact without any focal sensory/motor deficits. Cranial nerves: II-XII intact, grossly non-focal.  Psychiatric: Alert and oriented, thought content appropriate  Capillary Refill: Brisk,< 3 seconds   Peripheral Pulses: +2 palpable, equal bilaterally       Labs:   Recent Labs     07/16/21  0630 07/17/21  0553   WBC 14.2* 13.6*   HGB 10.3* 11.4*   HCT 35.2* 39.0    370     Recent Labs     07/16/21  0630 07/17/21  0553    142   K 3.9 4.2    104   CO2 30 30   BUN 10 10   CREATININE 0.3* 0.4   CALCIUM 8.6 8.9   PHOS 3.8 4.1     Microbiology:    Anaerobic and aerobic culture growing strep anginosus, Streptococcus conststellatus; strep mitis and oralis    Urinalysis:      Lab Results   Component Value Date    NITRU NEGATIVE 07/09/2021    WBCUA 5-9 07/09/2021    BACTERIA FEW 07/09/2021    RBCUA 3-5 07/09/2021    BLOODU LARGE 07/09/2021    GLUCOSEU NEGATIVE 07/09/2021       Radiology:  XR ACUTE ABD SERIES CHEST 1 VW    Result Date: 7/9/2021  PROCEDURE: XR ACUTE ABD SERIES CHEST 1 VW CLINICAL INFORMATION: severe pain COMPARISON: No prior study. TECHNIQUE: PA chest, AP supine abdomen and AP erect abdomen performed. FINDINGS: CHEST: POSTOPERATIVE CHANGES: None. LINES/TUBES/MECHANICAL DEVICES: None. TRACHEA/CARDIOMEDIASTINAL SILHOUETTE: Normal. LUNG FIELDS: Small right effusion at right base opacity. Interstitial and peribronchial thickening in the lower lobes. PNEUMOTHORAX: None. OSSEOUS STRUCTURES: 1.  No acute osseous abnormality. OTHER: None. ABDOMEN: POSTOPERATIVE CHANGES: None. LINES/TUBES/MECHANICAL DEVICES: None. BOWEL GAS PATTERN: 1. Except for air in the stomach, the bowel is gasless. FREE AIR: None. MASS SHADOWS: The liver appears large. PATHOLOGIC CALCIFICATIONS: None. OSSEOUS STRUCTURES: 1. Levocurvature of the lumbar spine. OTHER: None. 1. Small right effusion with right base consolidation. . 2. Unremarkable supine and upright views of the abdomen. 3. Hepatomegaly. **This report has been created using voice recognition software. It may contain minor errors which are inherent in voice recognition technology. ** Final report electronically signed by Dr. Sha Marks on 7/9/2021 9:26 AM    CT GUIDED NEEDLE PLACEMENT    Result Date: 7/9/2021  CT-GUIDED ABSCESS DRAINAGE PERFORMED BY: Etienne Mac. STERLING Tran: Fluid collections throughout the abdomen. One is within the right side and one is within the left side of the abdomen. APPROACH: Anterior right side of the abdomen. Anterior left side of the abdomen. CATHETER: 214 Urdu multipurpose drainage catheters. FLUID OBTAINED: Yellow-colored fluid on the right and reddish colored fluid on the left was aspirated from the abdomen. Specimens were sent to the laboratory for further analysis. SEDATION: Versed 1.5 mg and fentanyl 75 mcg , IV; the patient was sedated for 29 minutes during this procedure and monitored with EKG and pulse ox monitoring devices by a registered nurse. Face-to-face time with the patient was 29 minutes. PROCEDURE: Signed informed consent was obtained prior to performing this procedure. The patient was placed on the CT scanner and sedated, as indicated above.  ALL CT SCANS AT THIS FACILITY use dose modulation, iterative reconstruction, and/or weight-based dosing when appropriate to reduce radiation dose to as low as reasonably achievable. CT images were initially obtained to determine appropriate puncture sites. The skin was marked, prepped, and draped in a sterile fashion. Following local anesthesia and utilizing aseptic technique, needles were successfully passed into the fluid collections. A small amount of fluid was aspirated to confirm appropriate needle position. A guidewire was then passed through the needle followed by insertion of progressively larger dilators up to an port Western Melanie size. An 15 Turkish multi-side-hole drainage catheter was then passed over the wire and was coiled within the abscess pocket. The retaining suture was then locked in the standard fashion. Catheter was then hooked to a Prabhu-Close drainage bag and the catheter was flushed several times with sterile saline. The catheter was stabilized to the skin with a Percu-Stay device. A sample of the fluid was sent to laboratory for culture and sensitivity testing. Successful, uncomplicated placement of 14 Turkish drainage catheters into both sides of the abdomen. **This report has been created using voice recognition software. It may contain minor errors which are inherent in voice recognition technology. ** Final report electronically signed by Dr Kentrell Zafar on 7/9/2021 3:38 PM    CT GUIDED NEEDLE PLACEMENT    Result Date: 7/9/2021  CT-GUIDED ABSCESS DRAINAGE PERFORMED BY: Bo Foss. STERLING Flannery Spinner: Fluid collections throughout the abdomen. One is within the right side and one is within the left side of the abdomen. APPROACH: Anterior right side of the abdomen. Anterior left side of the abdomen. CATHETER: 214 Turkish multipurpose drainage catheters. FLUID OBTAINED: Yellow-colored fluid on the right and reddish colored fluid on the left was aspirated from the abdomen.  Specimens were sent to the laboratory for further analysis. SEDATION: Versed 1.5 mg and fentanyl 75 mcg , IV; the patient was sedated for 29 minutes during this procedure and monitored with EKG and pulse ox monitoring devices by a registered nurse. Face-to-face time with the patient was 29 minutes. PROCEDURE: Signed informed consent was obtained prior to performing this procedure. The patient was placed on the CT scanner and sedated, as indicated above. ALL CT SCANS AT THIS FACILITY use dose modulation, iterative reconstruction, and/or weight-based dosing when appropriate to reduce radiation dose to as low as reasonably achievable. CT images were initially obtained to determine appropriate puncture sites. The skin was marked, prepped, and draped in a sterile fashion. Following local anesthesia and utilizing aseptic technique, needles were successfully passed into the fluid collections. A small amount of fluid was aspirated to confirm appropriate needle position. A guidewire was then passed through the needle followed by insertion of progressively larger dilators up to an port Western Melanie size. An 15 Swiss multi-side-hole drainage catheter was then passed over the wire and was coiled within the abscess pocket. The retaining suture was then locked in the standard fashion. Catheter was then hooked to a Prabhu-Close drainage bag and the catheter was flushed several times with sterile saline. The catheter was stabilized to the skin with a Percu-Stay device. A sample of the fluid was sent to laboratory for culture and sensitivity testing. Successful, uncomplicated placement of 14 Swiss drainage catheters into both sides of the abdomen. **This report has been created using voice recognition software. It may contain minor errors which are inherent in voice recognition technology. ** Final report electronically signed by Dr Rafael Hall on 7/9/2021 3:38 PM    CT ABDOMEN PELVIS W IV CONTRAST Additional Contrast? None    Result Date: 7/9/2021  PROCEDURE: CT ABDOMEN PELVIS W IV CONTRAST CLINICAL INFORMATION: diffuse abdominal pain COMPARISON: CT abdomen and pelvis June 29, 2021 TECHNIQUE: 5 mm axial imaging through the abdomen and pelvis with IV contrast.  Coronal and sagittal reconstructions were performed. All CT scans at this facility use dose modulation, iterative reconstruction, and/or weight based dosing when appropriate to reduce the radiation dose to as low as reasonably achievable. CONTRAST: type and amount FINDINGS: LUNG BASES: Moderate bilateral pleural effusions are seen with adjacent atelectasis. LIVER/GALLBLADDER/BILIARY TREE: The liver measures 19.7 cm in craniocaudad dimension. There is redemonstration of a subcentimeter low-density focus in the lateral aspect of the posterior segment of the right lobe of the liver. The gallbladder is unremarkable except for prior cholecystectomy with elevated gallbladder pathology. PANCREAS/SPLEEN: Unremarkable. ADRENAL GLANDS: Unremarkable. KIDNEYS: Wedge-shaped bands of hypoenhancement are seen in both kidney, not present previously, appearance consistent with pyelonephritis. The kidneys overall do not appear particularly swollen at this time. . BOWEL: 1. Nonobstructive. There is diffuse thickening of the ileal loops, extending significantly more than previous, but the degree of thickness is not as severe as previous, particularly the terminal ileum. The colon is collapsed. The walls of the ascending colon and transverse colon appear inflamed and mildly thickened. Thickening of the walls of duodenal is also seen, with enhancement, also changed from previous. FREE AIR/FREE FLUID/INFLAMMATION: Large perihepatic collection with trapped air bubbles seen extending from dome of the liver through to the pelvis, measures approximately 4.5 cm in its largest AP dimension.  A second collection is seen in the left lower quadrant, measures approximately 4.7 cm in largest AP dimension, approximately 13.5 cm in craniocaudad dimension and connects across the lower abdomen upper pelvis to the perihepatic collection (coronal images 22-27). Smaller collections are seen in the pelvis centered at the periphery in a U shaped configuration, best demonstrated in the coronal plane and does not appear communicate with the larger, aforementioned collection. There is fluid throughout the mesentery. LYMPHADENOPATHY: 1. No pathologically enlarged lymph nodes. ABDOMINAL AORTA: Unremarkable. PELVIS: 1. U-shaped abscess centered around the periphery. Mesenteric inflammation. . ABDOMINAL WALL: Diffuse abdominal wall edema, with fluid density in the fascial planes, extending through to the imaged thigh. MUSCULOSKELETAL: Unremarkable lumbar spine and joints of the pelvis. No sign of infection. Levo curve of the lumbar spine. OTHER: None. 1.  Interval development of large perihepatic collection with trapped air bubbles extending from dome of the liver through to the lower abdomen/upper pelvis, measures approximately 4.5 cm in its largest AP dimension. A second collection is seen in the left lower quadrant, measures approximately 4.7 cm in largest AP dimension, approximately 13.5 cm in craniocaudad dimension and connects across the lower abdomen upper pelvis to the perihepatic collection, consistent with abscesses. Smaller collections are seen in the pelvis centered at the periphery in a U shaped configuration, best demonstrated in the coronal plane and does not appear to communicate with the larger, aforementioned collection. 2.  Mesenteric induration and free fluid. 3.  .Moderate bilateral pleural effusions with adjacent atelectasis. **This report has been created using voice recognition software. It may contain minor errors which are inherent in voice recognition technology. ** Final report electronically signed by Dr. Sissy Burnett on 7/9/2021 10:53 AM    CT ABSCESS DRAINAGE SOFT TISSUE    Result Date: 7/9/2021  CT-GUIDED ABSCESS DRAINAGE PERFORMED BY: Daryl Taveras. STERLING Brunner Block: Fluid collections throughout the abdomen. One is within the right side and one is within the left side of the abdomen. APPROACH: Anterior right side of the abdomen. Anterior left side of the abdomen. CATHETER: 214 Egyptian multipurpose drainage catheters. FLUID OBTAINED: Yellow-colored fluid on the right and reddish colored fluid on the left was aspirated from the abdomen. Specimens were sent to the laboratory for further analysis. SEDATION: Versed 1.5 mg and fentanyl 75 mcg , IV; the patient was sedated for 29 minutes during this procedure and monitored with EKG and pulse ox monitoring devices by a registered nurse. Face-to-face time with the patient was 29 minutes. PROCEDURE: Signed informed consent was obtained prior to performing this procedure. The patient was placed on the CT scanner and sedated, as indicated above. ALL CT SCANS AT THIS FACILITY use dose modulation, iterative reconstruction, and/or weight-based dosing when appropriate to reduce radiation dose to as low as reasonably achievable. CT images were initially obtained to determine appropriate puncture sites. The skin was marked, prepped, and draped in a sterile fashion. Following local anesthesia and utilizing aseptic technique, needles were successfully passed into the fluid collections. A small amount of fluid was aspirated to confirm appropriate needle position. A guidewire was then passed through the needle followed by insertion of progressively larger dilators up to an port Western Melanie size. An 15 Egyptian multi-side-hole drainage catheter was then passed over the wire and was coiled within the abscess pocket. The retaining suture was then locked in the standard fashion. Catheter was then hooked to a Prabhu-Close drainage bag and the catheter was flushed several times with sterile saline. The catheter was stabilized to the skin with a Percu-Stay device.  A sample of the fluid was sent to laboratory for culture and sensitivity testing. Successful, uncomplicated placement of 14 Slovenian drainage catheters into both sides of the abdomen. **This report has been created using voice recognition software. It may contain minor errors which are inherent in voice recognition technology. ** Final report electronically signed by Dr Rafael Hall on 7/9/2021 4:18 PM    CT ABSCESS DRAINAGE SOFT TISSUE    Result Date: 7/9/2021  CT-GUIDED ABSCESS DRAINAGE PERFORMED BY: Zuri Brown. Mau Larios M.D. Efraín Ortega: Fluid collections throughout the abdomen. One is within the right side and one is within the left side of the abdomen. APPROACH: Anterior right side of the abdomen. Anterior left side of the abdomen. CATHETER: 214 Slovenian multipurpose drainage catheters. FLUID OBTAINED: Yellow-colored fluid on the right and reddish colored fluid on the left was aspirated from the abdomen. Specimens were sent to the laboratory for further analysis. SEDATION: Versed 1.5 mg and fentanyl 75 mcg , IV; the patient was sedated for 29 minutes during this procedure and monitored with EKG and pulse ox monitoring devices by a registered nurse. Face-to-face time with the patient was 29 minutes. PROCEDURE: Signed informed consent was obtained prior to performing this procedure. The patient was placed on the CT scanner and sedated, as indicated above. ALL CT SCANS AT THIS FACILITY use dose modulation, iterative reconstruction, and/or weight-based dosing when appropriate to reduce radiation dose to as low as reasonably achievable. CT images were initially obtained to determine appropriate puncture sites. The skin was marked, prepped, and draped in a sterile fashion. Following local anesthesia and utilizing aseptic technique, needles were successfully passed into the fluid collections. A small amount of fluid was aspirated to confirm appropriate needle position.  A guidewire was then passed through the needle followed by insertion of progressively larger dilators up to an port Western Melanie size. An 15 Bengali multi-side-hole drainage catheter was then passed over the wire and was coiled within the abscess pocket. The retaining suture was then locked in the standard fashion. Catheter was then hooked to a Prabhu-Close drainage bag and the catheter was flushed several times with sterile saline. The catheter was stabilized to the skin with a Percu-Stay device. A sample of the fluid was sent to laboratory for culture and sensitivity testing. Successful, uncomplicated placement of 14 Bengali drainage catheters into both sides of the abdomen. **This report has been created using voice recognition software. It may contain minor errors which are inherent in voice recognition technology. ** Final report electronically signed by Dr Beto Luna on 7/9/2021 4:18 PM      DVT prophylaxis: [x] Lovenox                                 [] SCDs                                 [] SQ Heparin                                 [] Encourage ambulation           [] Already on Anticoagulation     Code Status: Full Code    Tele:   [] yes             [x] no    Active Hospital Problems    Diagnosis Date Noted    Severe malnutrition ([facility] [address] [E43] 07/13/2021     Class: Acute    Crohn's disease of small intestine with abscess ([address] [K50.014]     Intra-abdominal abscess ([address] [K65.1] 07/09/2021       Electronically signed by PRAVEENA Alexis CNP on 7/18/2021 at 6:33 AM

## 2021-07-18 NOTE — PROGRESS NOTES
Sujey Geiger   Daily Progress Note    Pt Name: [address] Record Number: [id number]  Date of Birth 1990   Today's Date: 7/18/2021    Hospital day # 9     ASSESSMENT   1. Large intra-abdominal abscess secondary to #6 - POD # 5 Status post exploratory lapartomy with washout and JAYMIE drain placement   2. Bilateral pleural effusions - status post thoracentesis - 0.7L drained  3. Severe malnutrition  4. Leukocytosis  5. Thrombocytosis  6. Elevated liver enzymes  7. Crohn's disease   has a past medical history of Crohn's colitis ([facility] [address] PLAN   1. Bowel function starting to return. Advance to regular diet today. Continue PPN and plan to wean to off tomorrow. 2. Thoracentesis completed. Tolerated well. Chest x-rays as needed. 3. IV antibiotics. ID following along. 4. Pain and nausea control  5. Reglan   6. Up ad kofi  7. Wound & JAYMIE drain management -right drain serous only with small output. Removed today. .  8. SCDs for DVT prophylaxis. Refusing Lovenox. 9. Medicine managing the weaning of steroids. Appreciate assistance. 10. Trend labs. Leukocytosis improving. 11. Clinically, looks good. Bowel function starting to return. Advancing diet. Removing second JAYMIE drain. Start to wean off of IV nutrition. Hopefully home in the next 24 hours if okay with consultants. SUBJECTIVE   Chief complaint: No new complaints    Chart reviewed. Stable overnight. Looks good this morning. Afebrile. Vital signs stable. States Norco working well. Abdominal midline incision looks good. JAYMIE drain right side is in place and serous. 55 mL last 24 hours. No urinary complaints. No nausea or vomiting. Abdomen is distended still but slightly better. Denies chest pain or shortness of breath. No lightheadedness or dizziness. Starting to pass more flatus. Had a small bowel movement.   CURRENT MEDICATIONS   Scheduled Meds:   methylPREDNISolone  10 mg Intravenous Daily    metoclopramide  5 mg Intravenous Q8H    sodium chloride flush  5-40 mL Intravenous 2 times per day    enoxaparin  40 mg Subcutaneous Daily    levofloxacin  750 mg Intravenous Q24H    insulin lispro  0-12 Units Subcutaneous Q6H    metroNIDAZOLE  500 mg Intravenous Q8H    famotidine (PEPCID) injection  20 mg Intravenous BID     Continuous Infusions:   PN-Adult  3 IN 1 Central Line (Custom) 75 mL/hr at 21 1852    sodium chloride 30 mL/hr at 21 1753    sodium chloride       PRN Meds:. HYDROmorphone **OR** HYDROmorphone, sodium chloride flush, sodium chloride, ondansetron **OR** ondansetron, HYDROcodone 5 mg - acetaminophen **OR** HYDROcodone 5 mg - acetaminophen, ondansetron **OR** ondansetron  OBJECTIVE   CURRENT VITALS:  height is 4' 11\" (1.499 m) and weight is 90 lb (40.8 kg). Her oral temperature is 68 °F (20 °C) (abnormal). Her blood pressure is 115/73 and her pulse is 68. Her respiration is 16 and oxygen saturation is 98%. Temperature Range (24h):Temp: (!) 68 °F (20 °C) Temp  Av.8 °F (33.2 °C)  Min: 68 °F (20 °C)  Max: 98.2 °F (36.8 °C)  BP Range (22M): Systolic (69BCY), HWL:307 , Min:101 , NRB:964     Diastolic (79DAK), JSU:81, Min:65, Max:73    Pulse Range (24h): Pulse  Av.8  Min: 68  Max: 92  Respiration Range (24h): Resp  Av  Min: 16  Max: 16  Current Pulse Ox (24h):  SpO2: 98 %  Pulse Ox Range (24h):  SpO2  Av.2 %  Min: 96 %  Max: 99 %  Oxygen Amount and Delivery: O2 Flow Rate (L/min): 1 L/min  Incentive Spirometry Tx:            GENERAL: alert, cooperative, no distress  SKIN: Skin color, texture, turgor normal. No rashes or lesions. HEENT: Head is normocephalic, atraumatic. NECK: Supple, symmetrical, trachea midline  LUNGS: clear but diminished   HEART: Regular rate and regular rhythm  ABDOMEN: soft, generalized tenderness, less distended, hypoactive bowel sounds present. JAYMIE drain right. Left drain removed and dressing dry.  Midline incision clean, dry and intact with steri-strips. NEUROLOGIC: There are no focalizing motor or sensory deficits. CN II-XII are grossly intact. EXTREMITIES: no cyanosis, no clubbing and no edema. In: 1323.2 [P.O.:550; I.V.:773.2]  Out: 55 [Drains:55]  Date 07/18/21 0000 - 07/18/21 2359   Shift 2435-5384 5397-8695 5972-1040 24 Hour Total   INTAKE   P.O.(mL/kg/hr) 280   280   I. V.(mL/kg) 773. 2(18.9)   773. 2(18.9)   Shift Total(mL/kg) 1053. 2(25.8)   1053. 2(25.8)   OUTPUT   Emesis/NG output(mL/kg) 0(0)   0(0)   Drains(mL/kg) 10(0.2)   10(0.2)   Other(mL/kg) 0(0)   0(0)   Stool(mL/kg) 0(0)   0(0)   Blood(mL/kg) 0(0)   0(0)   Shift Total(mL/kg) 10(0.2)   10(0.2)   Weight (kg) 40.8 40.8 40.8 40.8     LABS     Recent Labs     07/16/21  0630 07/17/21  0553   WBC 14.2* 13.6*   HGB 10.3* 11.4*   HCT 35.2* 39.0    370    142   K 3.9 4.2    104   CO2 30 30   BUN 10 10   CREATININE 0.3* 0.4   MG 1.8 1.9   PHOS 3.8 4.1   CALCIUM 8.6 8.9      RADIOLOGY     PROCEDURE: XR CHEST 1 VIEW       CLINICAL INFORMATION: 57-year-old female who underwent a right-sided thoracentesis.       COMPARISON: Chest x-ray 7/16/2021.       TECHNIQUE: AP upright view of the chest was obtained.       FINDINGS:    The right-sided pleural effusion has decreased in size.       There is no evidence of a pneumothorax.       The cardiac silhouette and pulmonary vasculature are within normal limits.       Visualized portions of the upper abdomen are within normal limits.       The osseous structures are intact. No acute fractures or suspicious osseous lesions.           Impression   No pneumothorax following a right-sided thoracentesis.           **This report has been created using voice recognition software. It may contain minor errors which are inherent in voice recognition technology. **       Final report electronically signed by Dr Lexi Lanier on 7/16/2021 12:11 PM     PROCEDURE: US THORACENTESIS       CLINICAL INFORMATION: 63-year-old female with a right-sided pleural effusion .       COMPARISON: No prior study.       PROCEDURE:       Physician performing procedure: Dr Reyna Tom   Informed consent signed: Yes   Local Anesthetic: 2%   Specimen volume: 70 ml   Catheter: 19 ga 5 Portuguese   Aspirated pleural fluid volume: 0.7 liters   Aspirated pleural fluid color: Yellow   Complications: None observed       The benefits and the risk of the procedure were explained to the patient. The patient was given an opportunity to ask questions. Signed consent was obtained. Limited ultrasound exam of the right chest showed  large  amount of pleural fluid.  Using ultrasound guidance, a site was marked on the skin.           The right side of the posterior chest was prepped and draped using the usual sterile technique and the skin was anesthetized with 2 percent lidocaine.        A 5 Occitan one-step catheter was introduced to the right pleural space. 0.7 L of yellow-colored fluid drained. The catheter was then removed.       The patient tolerated the procedure well with no complications. Specimen sent for laboratory studies as requested.           Impression       Successful ultrasound-guided thoracentesis with  0.7 L of fluid drained.                   **This report has been created using voice recognition software. It may contain minor errors which are inherent in voice recognition technology. **           Final report electronically signed by Dr Donald Garcia on 7/16/2021 1:06 PM     2 view chest x-ray       Comparison:  CR,SR  - XR CHEST STANDARD (2 VW)  - 07/15/2021 12:47 PM EDT       Findings: Moderate to large right pleural effusion is unchanged. Small left pleural effusion. Partial atelectasis or consolidation left medial lung base unchanged. Heart size is normal.   No acute fracture.           Impression   Unchanged effusions.       This document has been electronically signed by:  Shae Jaquez MD on 07/16/2021 08:08 AM     PROCEDURE: XR CHEST (2 VW)       CLINICAL INFORMATION: follow up imaging bilateral pleural effusions       COMPARISON: 7/19/2021       TECHNIQUE: PA and lateral views of the chest were obtained.               Impression   1. Normal heart size. Tiny effusion left side. Moderate-sized effusion right side. Moderate right basilar atelectasis/pneumonia. Mild left basilar atelectasis/pneumonia. 2. Overall appearance of chest has worsened since prior.               **This report has been created using voice recognition software.  It may contain minor errors which are inherent in voice recognition technology. **       Final report electronically signed by Dr. Darcy Mijares on 7/15/2021 12:53 PM     Electronically signed by Consuelo Fabian MD on 7/18/2021 at 6:28 AM

## 2021-07-19 VITALS
WEIGHT: 90 LBS | HEIGHT: 59 IN | HEART RATE: 86 BPM | BODY MASS INDEX: 18.14 KG/M2 | OXYGEN SATURATION: 99 % | TEMPERATURE: 98.1 F | DIASTOLIC BLOOD PRESSURE: 59 MMHG | SYSTOLIC BLOOD PRESSURE: 100 MMHG | RESPIRATION RATE: 16 BRPM

## 2021-07-19 LAB
ANION GAP SERPL CALCULATED.3IONS-SCNC: 7 MEQ/L (ref 8–16)
BUN BLDV-MCNC: 15 MG/DL (ref 7–22)
CALCIUM IONIZED: 1.12 MMOL/L (ref 1.12–1.32)
CALCIUM SERPL-MCNC: 8.9 MG/DL (ref 8.5–10.5)
CHLORIDE BLD-SCNC: 103 MEQ/L (ref 98–111)
CO2: 30 MEQ/L (ref 23–33)
CREAT SERPL-MCNC: 0.4 MG/DL (ref 0.4–1.2)
GFR SERPL CREATININE-BSD FRML MDRD: > 90 ML/MIN/1.73M2
GLUCOSE BLD-MCNC: 102 MG/DL (ref 70–108)
GLUCOSE BLD-MCNC: 105 MG/DL (ref 70–108)
GLUCOSE BLD-MCNC: 125 MG/DL (ref 70–108)
GLUCOSE BLD-MCNC: 138 MG/DL (ref 70–108)
GLUCOSE BLD-MCNC: 82 MG/DL (ref 70–108)
MAGNESIUM: 1.9 MG/DL (ref 1.6–2.4)
PHOSPHORUS: 3.8 MG/DL (ref 2.4–4.7)
POTASSIUM SERPL-SCNC: 3.9 MEQ/L (ref 3.5–5.2)
SODIUM BLD-SCNC: 140 MEQ/L (ref 135–145)

## 2021-07-19 PROCEDURE — 82948 REAGENT STRIP/BLOOD GLUCOSE: CPT

## 2021-07-19 PROCEDURE — 84100 ASSAY OF PHOSPHORUS: CPT

## 2021-07-19 PROCEDURE — 99232 SBSQ HOSP IP/OBS MODERATE 35: CPT | Performed by: NURSE PRACTITIONER

## 2021-07-19 PROCEDURE — 83735 ASSAY OF MAGNESIUM: CPT

## 2021-07-19 PROCEDURE — 2500000003 HC RX 250 WO HCPCS: Performed by: SURGERY

## 2021-07-19 PROCEDURE — 80048 BASIC METABOLIC PNL TOTAL CA: CPT

## 2021-07-19 PROCEDURE — 6370000000 HC RX 637 (ALT 250 FOR IP): Performed by: SURGERY

## 2021-07-19 PROCEDURE — 82330 ASSAY OF CALCIUM: CPT

## 2021-07-19 PROCEDURE — 6360000002 HC RX W HCPCS: Performed by: NURSE PRACTITIONER

## 2021-07-19 PROCEDURE — 36415 COLL VENOUS BLD VENIPUNCTURE: CPT

## 2021-07-19 PROCEDURE — 99024 POSTOP FOLLOW-UP VISIT: CPT | Performed by: SURGERY

## 2021-07-19 RX ORDER — LEVOFLOXACIN 750 MG/1
750 TABLET ORAL DAILY
Qty: 7 TABLET | Refills: 0 | Status: SHIPPED | OUTPATIENT
Start: 2021-07-19 | End: 2021-07-29 | Stop reason: SDUPTHER

## 2021-07-19 RX ORDER — METRONIDAZOLE 500 MG/1
500 TABLET ORAL 3 TIMES DAILY
Qty: 21 TABLET | Refills: 0 | Status: SHIPPED | OUTPATIENT
Start: 2021-07-19 | End: 2021-07-29 | Stop reason: SDUPTHER

## 2021-07-19 RX ORDER — HYDROCODONE BITARTRATE AND ACETAMINOPHEN 5; 325 MG/1; MG/1
1-2 TABLET ORAL EVERY 6 HOURS PRN
Qty: 30 TABLET | Refills: 0 | Status: SHIPPED | OUTPATIENT
Start: 2021-07-19 | End: 2021-07-22

## 2021-07-19 RX ADMIN — METRONIDAZOLE 500 MG: 500 INJECTION, SOLUTION INTRAVENOUS at 03:45

## 2021-07-19 RX ADMIN — METOCLOPRAMIDE 5 MG: 5 INJECTION, SOLUTION INTRAMUSCULAR; INTRAVENOUS at 09:38

## 2021-07-19 RX ADMIN — HYDROCODONE BITARTRATE AND ACETAMINOPHEN 2 TABLET: 5; 325 TABLET ORAL at 08:21

## 2021-07-19 RX ADMIN — METRONIDAZOLE 500 MG: 500 INJECTION, SOLUTION INTRAVENOUS at 12:03

## 2021-07-19 RX ADMIN — FAMOTIDINE 20 MG: 10 INJECTION INTRAVENOUS at 08:23

## 2021-07-19 RX ADMIN — METOCLOPRAMIDE 5 MG: 5 INJECTION, SOLUTION INTRAMUSCULAR; INTRAVENOUS at 01:38

## 2021-07-19 RX ADMIN — HYDROCODONE BITARTRATE AND ACETAMINOPHEN 1 TABLET: 5; 325 TABLET ORAL at 14:38

## 2021-07-19 RX ADMIN — METHYLPREDNISOLONE SODIUM SUCCINATE 10 MG: 40 INJECTION, POWDER, FOR SOLUTION INTRAMUSCULAR; INTRAVENOUS at 09:33

## 2021-07-19 ASSESSMENT — PAIN DESCRIPTION - PROGRESSION
CLINICAL_PROGRESSION: NOT CHANGED

## 2021-07-19 ASSESSMENT — PAIN SCALES - GENERAL
PAINLEVEL_OUTOF10: 6
PAINLEVEL_OUTOF10: 4
PAINLEVEL_OUTOF10: 6

## 2021-07-19 ASSESSMENT — PAIN DESCRIPTION - PAIN TYPE: TYPE: SURGICAL PAIN

## 2021-07-19 ASSESSMENT — PAIN DESCRIPTION - DESCRIPTORS: DESCRIPTORS: ACHING;DULL

## 2021-07-19 ASSESSMENT — PAIN DESCRIPTION - FREQUENCY: FREQUENCY: INTERMITTENT

## 2021-07-19 ASSESSMENT — PAIN DESCRIPTION - LOCATION: LOCATION: ABDOMEN

## 2021-07-19 NOTE — PLAN OF CARE
Problem: Pain:  Description: Pain management should include both nonpharmacologic and pharmacologic interventions. Goal: Pain level will decrease  Description: Pain level will decrease  7/18/2021 2341 by Kuldeep Marquez RN  Outcome: Ongoing  Note: Patient pain goal is 3/10. Patient reporting pain up to 7. PRN Norco available. Patient utilizes rest and repositioning for comfort. Pain assessment ongoing. 0/76/9348 2122 by Zenon Medeiros RN  Outcome: Ongoing  Goal: Control of acute pain  Description: Control of acute pain  7/18/2021 2341 by Kuldeep Marquez RN  Outcome: Ongoing  Note: Patient pain goal is 3/10. Patient reporting pain up to 7. PRN Norco available. Patient utilizes rest and repositioning for comfort. Pain assessment ongoing. 5/09/0292 2844 by Zenon Medeiros RN  Outcome: Ongoing  Goal: Control of chronic pain  Description: Control of chronic pain  7/18/2021 2341 by Kuldeep Marquez RN  Outcome: Ongoing  Note: Patient pain goal is 3/10. Patient reporting pain up to 7. PRN Norco available. Patient utilizes rest and repositioning for comfort. Pain assessment ongoing. 9/68/9551 6628 by Zenon Medeiros RN  Outcome: Ongoing     Problem: Infection - Surgical Site:  Goal: Will show no infection signs and symptoms  Description: Will show no infection signs and symptoms  7/18/2021 2341 by Kuldeep Marquez RN  Outcome: Ongoing  Note: No new signs of infection shown this shift. 5/99/5020 2078 by Zenon Medeiros RN  Outcome: Ongoing     Problem: Pain Control  Goal: Maintain pain level at or below patient's acceptable level (or 5 if patient is unable to determine acceptable level)  7/18/2021 2341 by Kuldeep Marquez RN  Outcome: Ongoing  Flowsheets (Taken 7/18/2021 2135)  Patient's Stated Pain Goal: 3  Note: Patient pain goal is 3/10. Patient reporting pain up to 7. PRN Norco available. Patient utilizes rest and repositioning for comfort. Pain assessment ongoing.    7/18/2021 1151 by Shanell Kirkpatrick RN  Outcome: Ongoing  Goal: Improvement in pain related behaviors BP/HR WNL  7/18/2021 2341 by Prince Rojo RN  Outcome: Ongoing  Note: Patient pain goal is 3/10. Patient reporting pain up to 7. PRN Norco available. Patient utilizes rest and repositioning for comfort. Pain assessment ongoing. 6/25/3223 8865 by Shanell Kirkpatrick RN  Outcome: Ongoing     Problem: GI  Goal: No bowel complications  1/17/9798 2341 by Prince Rojo RN  Outcome: Ongoing  Note: No bowel movement this shift. 9/57/1946 1591 by Shanell Kirkpatrick RN  Outcome: Ongoing  Goal: Bowel movement at least every other day  7/18/2021 2341 by Prince Rojo RN  Outcome: Ongoing  Note: No bowel movement this shift. 8/69/1660 7367 by Shanell Kirkpatrick RN  Outcome: Ongoing     Problem:   Goal: Adequate urinary output  7/18/2021 2341 by Prince Rojo RN  Outcome: Ongoing  Note: Patient is up to urinate frequently. 1/08/3463 2607 by Shanell Kirkpatrick RN  Outcome: Ongoing     Problem: Nutrition  Goal: Optimal nutrition therapy  7/18/2021 2341 by Prince Rojo RN  Outcome: Ongoing  Note: TPN at 75mL. Patient is now on a regular diet. 0/89/3816 5676 by Shanell Kirkpatrick RN  Outcome: Ongoing     Problem: Safety:  Goal: Free from accidental physical injury  Description: Free from accidental physical injury  7/18/2021 2341 by Prince Rojo RN  Outcome: Ongoing  Note: Patient bed in lowest position, wheels locked, 2/4 side rails up and alarm on. Call light and belongings within reach. Pathway clear. Nonskid footwear on. Patient rounded on hourly. Fall risk assessment complete. Patient remains free from falls this shift, will continue to monitor.      3/96/1739 0685 by Shanell Kirkpatrick RN  Outcome: Ongoing  Goal: Free from intentional harm  Description: Free from intentional harm  7/18/2021 2341 by Prince Rojo RN  Outcome: Ongoing  Note: Patient bed in lowest position, wheels locked, 2/4 side rails up and alarm on. Call light and belongings within reach. Pathway clear. Nonskid footwear on. Patient rounded on hourly. Fall risk assessment complete. Patient remains free from falls this shift, will continue to monitor. 4/52/4729 8680 by Tiffany Flores RN  Outcome: Ongoing     Problem: Discharge Planning:  Goal: Patients continuum of care needs are met  Description: Patients continuum of care needs are met  7/18/2021 2341 by Pasha Huddleston RN  Outcome: Ongoing  Note: Patient is from home with . Possible discharge tomorrow. 7/27/0351 0937 by Tiffany Flores RN  Outcome: Ongoing     Care plan reviewed with patient and RN. Patient and RN verbalize understanding of the plan of care and contribute to goal setting.

## 2021-07-19 NOTE — PROGRESS NOTES
Dimitri Laguerre   Daily Progress Note    Pt Name: [address] Record Number: [id number]  Date of Birth 1990   Today's Date: 7/19/2021    Hospital day # 10     ASSESSMENT   1. Large intra-abdominal abscess secondary to #6 - POD # 6 Status post exploratory lapartomy with washout and JAYMIE drain placement   2. Bilateral pleural effusions - status post thoracentesis - 0.7L drained  3. Severe malnutrition  4. Leukocytosis  5. Thrombocytosis  6. Elevated liver enzymes  7. Crohn's disease   has a past medical history of Crohn's colitis ([facility] [address] PLAN   1. Bowel function returned. Tolerating diet. Stop PPN. 2. Thoracentesis completed. Tolerated well. Chest x-rays as needed. 3. IV antibiotics. ID following along. Oral antibiotics pending their recommendations for discharge today. 4. Pain and nausea control  5. Reglan   6. Up ad kofi  7. Wound & JAYMIE drain site management   8. SCDs for DVT prophylaxis. Refusing Lovenox. 9. Medicine managing the weaning of steroids. Appreciate assistance. Home with tapered dose steroids pending their recommendations. 10. Trend labs. Leukocytosis improving. 11. Clinically, looks good. Tolerating diet. Bowel function returned. JAYMIE drains removed. Off IV fluids. Stopping PPN. Home today if okay with consultants. Follow-up in office. SUBJECTIVE   Chief complaint: No new complaints    Chart reviewed. Stable overnight. Looks good this morning. Afebrile. Vital signs stable. States Norco working well. Abdominal midline incision looks good. JAYMIE drain right side removed yesterday. No urinary complaints. No nausea or vomiting. Abdomen is distended still but slightly better. Denies chest pain or shortness of breath. No lightheadedness or dizziness. Starting to pass more flatus. Has had bowel movements. Hoping to go home today.   CURRENT MEDICATIONS   Scheduled Meds:   methylPREDNISolone  10 mg Intravenous Daily    metoclopramide  5 mg Intravenous Q8H    sodium chloride flush  5-40 mL Intravenous 2 times per day    enoxaparin  40 mg Subcutaneous Daily    levofloxacin  750 mg Intravenous Q24H    insulin lispro  0-12 Units Subcutaneous Q6H    metroNIDAZOLE  500 mg Intravenous Q8H    famotidine (PEPCID) injection  20 mg Intravenous BID     Continuous Infusions:   PN-Adult  3 IN 1 Central Line (Custom) 75 mL/hr at 21 1848    sodium chloride 20 mL/hr at 21 0939    sodium chloride       PRN Meds:. HYDROmorphone **OR** HYDROmorphone, sodium chloride flush, sodium chloride, ondansetron **OR** ondansetron, HYDROcodone 5 mg - acetaminophen **OR** HYDROcodone 5 mg - acetaminophen, ondansetron **OR** ondansetron  OBJECTIVE   CURRENT VITALS:  height is 4' 11\" (1.499 m) and weight is 90 lb (40.8 kg). Her oral temperature is 98.2 °F (36.8 °C). Her blood pressure is 115/59 (abnormal) and her pulse is 76. Her respiration is 18 and oxygen saturation is 98%. Temperature Range (24h):Temp: 98.2 °F (36.8 °C) Temp  Av.1 °F (36.7 °C)  Min: 97.7 °F (36.5 °C)  Max: 98.5 °F (36.9 °C)  BP Range (12C): Systolic (85LBF), IRD:855 , Min:101 , HHB:343     Diastolic (02GPN), KPB:37, Min:59, Max:66    Pulse Range (24h): Pulse  Av.6  Min: 76  Max: 102  Respiration Range (24h): Resp  Av.8  Min: 16  Max: 18  Current Pulse Ox (24h):  SpO2: 98 %  Pulse Ox Range (24h):  SpO2  Av.4 %  Min: 97 %  Max: 100 %  Oxygen Amount and Delivery: O2 Flow Rate (L/min): 1 L/min  Incentive Spirometry Tx:            GENERAL: alert, cooperative, no distress  SKIN: Skin color, texture, turgor normal. No rashes or lesions. HEENT: Head is normocephalic, atraumatic. NECK: Supple, symmetrical, trachea midline  LUNGS: clear but diminished   HEART: Regular rate and regular rhythm  ABDOMEN: soft, generalized tenderness, less distended, hypoactive bowel sounds present. JAYMIE drain sites dry. Midline incision clean, dry and intact with steri-strips. NEUROLOGIC: There are no focalizing motor or sensory deficits. CN II-XII are grossly intact. EXTREMITIES: no cyanosis, no clubbing and no edema. In: 1598.6 [P.O.:200; I.V.:1398.6]  Out: -   Date 07/19/21 0000 - 07/19/21 2359   Shift 1411-7289 9643-4419 3008-9959 24 Hour Total   INTAKE   P.O.(mL/kg/hr) 100(0.3)   100   I. V.(mL/kg) 693.6(17)   693.6(17)   Shift Total(mL/kg) 793. 6(19.4)   793. 6(19.4)   OUTPUT   Shift Total(mL/kg)       Weight (kg) 40.8 40.8 40.8 40.8     LABS     Recent Labs     07/17/21  0553 07/18/21  0558   WBC 13.6*  --    HGB 11.4*  --    HCT 39.0  --      --     141   K 4.2 4.0    103   CO2 30 29   BUN 10 11   CREATININE 0.4 0.3*   MG 1.9 1.9   PHOS 4.1 4.2   CALCIUM 8.9 8.8      RADIOLOGY     PROCEDURE: XR CHEST 1 VIEW       CLINICAL INFORMATION: 47-year-old female who underwent a right-sided thoracentesis.       COMPARISON: Chest x-ray 7/16/2021.       TECHNIQUE: AP upright view of the chest was obtained.       FINDINGS:    The right-sided pleural effusion has decreased in size.       There is no evidence of a pneumothorax.       The cardiac silhouette and pulmonary vasculature are within normal limits.       Visualized portions of the upper abdomen are within normal limits.       The osseous structures are intact. No acute fractures or suspicious osseous lesions.           Impression   No pneumothorax following a right-sided thoracentesis.           **This report has been created using voice recognition software. It may contain minor errors which are inherent in voice recognition technology. **       Final report electronically signed by Dr Jan Nguyen on 7/16/2021 12:11 PM     PROCEDURE: US THORACENTESIS       CLINICAL INFORMATION: 47-year-old female with a right-sided pleural effusion .       COMPARISON: No prior study.       PROCEDURE:       Physician performing procedure: Dr Gary Paez   Informed consent signed: Yes   Local Anesthetic: 2%   Specimen volume: 70 ml   Catheter: 19 ga 5 Icelandic   Aspirated pleural fluid volume: 0.7 liters   Aspirated pleural fluid color: Yellow   Complications: None observed       The benefits and the risk of the procedure were explained to the patient. The patient was given an opportunity to ask questions. Signed consent was obtained. Limited ultrasound exam of the right chest showed  large  amount of pleural fluid.  Using ultrasound guidance, a site was marked on the skin.           The right side of the posterior chest was prepped and draped using the usual sterile technique and the skin was anesthetized with 2 percent lidocaine.        A 5 Upper sorbian one-step catheter was introduced to the right pleural space. 0.7 L of yellow-colored fluid drained. The catheter was then removed.       The patient tolerated the procedure well with no complications. Specimen sent for laboratory studies as requested.           Impression       Successful ultrasound-guided thoracentesis with  0.7 L of fluid drained.                   **This report has been created using voice recognition software. It may contain minor errors which are inherent in voice recognition technology. **           Final report electronically signed by Dr Lisa Berry on 7/16/2021 1:06 PM     2 view chest x-ray       Comparison:  CR,SR  - XR CHEST STANDARD (2 VW)  - 07/15/2021 12:47 PM EDT       Findings: Moderate to large right pleural effusion is unchanged. Small left pleural effusion. Partial atelectasis or consolidation left medial lung base unchanged. Heart size is normal.   No acute fracture.           Impression   Unchanged effusions.       This document has been electronically signed by: Hudson Reyez MD on 07/16/2021 08:08 AM     PROCEDURE: XR CHEST (2 VW)       CLINICAL INFORMATION: follow up imaging bilateral pleural effusions       COMPARISON: 7/19/2021       TECHNIQUE: PA and lateral views of the chest were obtained.               Impression   1. Normal heart size. Tiny effusion left side. Moderate-sized effusion right side. Moderate right basilar atelectasis/pneumonia. Mild left basilar atelectasis/pneumonia. 2. Overall appearance of chest has worsened since prior.               **This report has been created using voice recognition software.  It may contain minor errors which are inherent in voice recognition technology. **       Final report electronically signed by Dr. Shannon Busby on 7/15/2021 12:53 PM     Electronically signed by Ofelia Ellis MD on 7/19/2021 at 8:05 AM

## 2021-07-19 NOTE — PLAN OF CARE
Problem: Nutrition  Goal: Optimal nutrition therapy  7/19/2021 1007 by Mathieu Saxena, MS, RD, LD  Outcome: Ongoing   Nutrition Problem #1: Severe malnutrition, In context of acute illness or injury  Intervention: Food and/or Nutrient Delivery: Continue Current Diet, Vitamin Supplement, Discontinue Parenteral Nutrition  Nutritional Goals: Pt will conume and/or tolerate adequate nutrition support to meet 75% or more of estimated nutritional needs during LOS until able to transition to only po feeds.

## 2021-07-19 NOTE — PROGRESS NOTES
Hospitalist Progress Note    Patient:  Thomas Hamilton      Unit/Bed:5K-11/011-A    YOB: 1990    MRN: [id number]       Acct: [de-identified]     PCP: Alexander Ny MD    Date of Admission: 7/9/2021    Assessment/Plan:    1. Crohn's disease with acute exacerbation--per GI; ended Solu-Medrol today; Humira on hold since April; needs a 3-week follow-up with Dr. Amadeo Moreno  2. Large intra-abdominal abscess status post laparotomy, washout and drain placement on 7/13/2021--per surgery; Levaquin 7/12; Flagyl 7/9; on PPN and tolerating~plan by surgery is to stop PPN and discharged home with oral antibiotics as per infectious disease  3. Moderate right pleural effusion S/P right thoracentesis on 7/16 with 700 ml--not in any distress, on room air  4. Leukocytosis--patient is on steroids, she remains afebrile, trending down, monitor  5. Chronic normocytic anemia--stable  6. Severe malnutrition--per dietitian    Expected discharge date: Per primary, hospitalist will sign off, please call if issues arise    Disposition:    [x] Home       [] TCU       [] Rehab       [] Psych       [] SNF       [] [place]       [] Other-    Chief Complaint: Abdominal pain    Hospital Course: Per initial H&P: \"The patient is a 33 y. o. female who presented to [facility] with abd pain. Patient was just discharged from our service last week. She had acute Crohn's exacerbation. She was discharged on 7/4/21 on 20 mg BID  PO Prednison. Patient was supposed to start on Humira yesterday. Patient reported worsening abdominal pain on 7/9/21 early AM. She presented to ED where CT showed large perihepatic and smaller pelvic fluid collections.  S/P 2 drains placement by IR\"    7/14--> had exploratory laparotomy along with washout and drain placement on 7/13; hemodynamically stable    7/15--> hemodynamically stable and afebrile, surgery repeated a chest x-ray today shows a moderate sized right pleural effusion along with moderate right basilar atelectasis/pneumonia and mild left basilar atelectasis/pneumonia~she is on Levaquin and again she is on room air and afebrile     7/16--> chest x-ray reviewed and shows moderate to large right effusion and this was discussed with the patient and she agrees to thoracentesis; diet been advanced to full liquid, no BM yet    7/17--> ambulated in iraheta well yesterday, had thoracentesis done yesterday with 700 mL drained; weaning steroids    7/18--> surgery note reviewed and advancing to regular diet today continue with the PPN today and plan to wean it off tomorrow; plan to remove JAYMIE drain today per surgery    7/19--> hemodynamically stable, ambulating in the hallway; last dose of steroids today     Subjective (past 24 hours): Sitting up in chair; feeling a lot better today, she is having stools, minimal abdominal pain, overall states she feels much better, looks and acts so much better, walking in the halls well yesterday and today, hoping she can go home, she is tolerating her diet    Medications:  Reviewed    Infusion Medications    PN-Adult  3 IN 1 Central Line (Custom) 75 mL/hr at 07/18/21 1848    sodium chloride 20 mL/hr at 07/18/21 9802    sodium chloride       Scheduled Medications    methylPREDNISolone  10 mg Intravenous Daily    metoclopramide  5 mg Intravenous Q8H    sodium chloride flush  5-40 mL Intravenous 2 times per day    enoxaparin  40 mg Subcutaneous Daily    levofloxacin  750 mg Intravenous Q24H    insulin lispro  0-12 Units Subcutaneous Q6H    metroNIDAZOLE  500 mg Intravenous Q8H    famotidine (PEPCID) injection  20 mg Intravenous BID     PRN Meds: HYDROmorphone **OR** HYDROmorphone, sodium chloride flush, sodium chloride, ondansetron **OR** ondansetron, HYDROcodone 5 mg - acetaminophen **OR** HYDROcodone 5 mg - acetaminophen, ondansetron **OR** ondansetron      Intake/Output Summary (Last 24 hours) at 7/19/2021 4077  Last data filed at 7/19/2021 3689  Vasiliy per 24 hour   Intake 2898.58 ml   Output 0 ml   Net 2898.58 ml       Diet:  ADULT DIET; Regular  PN-Adult 3 IN 1 Peripheral Line (Custom)    Exam:  BP (!) 115/59   Pulse 76   Temp 98.2 °F (36.8 °C) (Oral)   Resp 18   Ht 4' 11\" (1.499 m)   Wt 90 lb (40.8 kg)   LMP 07/09/2021   SpO2 98%   BMI 18.18 kg/m²     General appearance: No apparent distress, appears stated age and cooperative. HEENT: Pupils equal, round, and reactive to light. Conjunctivae/corneas clear. Neck: Supple, with full range of motion. No jugular venous distention. Trachea midline. Respiratory:  Normal respiratory effort. Clear to auscultation   Cardiovascular: Regular rate and rhythm with normal S1/S2 without murmurs, rubs or gallops. Abdomen: Surgical site and generalized tenderness; drains noted  Musculoskeletal: passive and active ROM x 4 extremities. Skin: Skin color, texture, turgor normal.    Neurologic:  Neurovascularly intact without any focal sensory/motor deficits.  Cranial nerves: II-XII intact, grossly non-focal.  Psychiatric: Alert and oriented, thought content appropriate  Capillary Refill: Brisk,< 3 seconds   Peripheral Pulses: +2 palpable, equal bilaterally       Labs:   Recent Labs     07/17/21  0553   WBC 13.6*   HGB 11.4*   HCT 39.0        Recent Labs     07/17/21  0553 07/18/21  0558    141   K 4.2 4.0    103   CO2 30 29   BUN 10 11   CREATININE 0.4 0.3*   CALCIUM 8.9 8.8   PHOS 4.1 4.2     Microbiology:    Anaerobic and aerobic culture growing strep anginosus, Streptococcus conststellatus; strep mitis and oralis    Urinalysis:      Lab Results   Component Value Date    NITRU NEGATIVE 07/09/2021    WBCUA 5-9 07/09/2021    BACTERIA FEW 07/09/2021    RBCUA 3-5 07/09/2021    BLOODU LARGE 07/09/2021    GLUCOSEU NEGATIVE 07/09/2021       Radiology:  XR ACUTE ABD SERIES CHEST 1 VW    Result Date: 7/9/2021  PROCEDURE: XR ACUTE ABD SERIES CHEST 1 VW CLINICAL INFORMATION: severe pain COMPARISON: No prior prior to performing this procedure. The patient was placed on the CT scanner and sedated, as indicated above. ALL CT SCANS AT THIS FACILITY use dose modulation, iterative reconstruction, and/or weight-based dosing when appropriate to reduce radiation dose to as low as reasonably achievable. CT images were initially obtained to determine appropriate puncture sites. The skin was marked, prepped, and draped in a sterile fashion. Following local anesthesia and utilizing aseptic technique, needles were successfully passed into the fluid collections. A small amount of fluid was aspirated to confirm appropriate needle position. A guidewire was then passed through the needle followed by insertion of progressively larger dilators up to an port Western Melanie size. An 15 New Zealander multi-side-hole drainage catheter was then passed over the wire and was coiled within the abscess pocket. The retaining suture was then locked in the standard fashion. Catheter was then hooked to a Parbhu-Close drainage bag and the catheter was flushed several times with sterile saline. The catheter was stabilized to the skin with a Percu-Stay device. A sample of the fluid was sent to laboratory for culture and sensitivity testing. Successful, uncomplicated placement of 14 New Zealander drainage catheters into both sides of the abdomen. **This report has been created using voice recognition software. It may contain minor errors which are inherent in voice recognition technology. ** Final report electronically signed by Dr Lexi Lanier on 7/9/2021 3:38 PM    CT GUIDED NEEDLE PLACEMENT    Result Date: 7/9/2021  CT-GUIDED ABSCESS DRAINAGE PERFORMED BY: Armin Le M.D. Ryder Almeida: Fluid collections throughout the abdomen. One is within the right side and one is within the left side of the abdomen. APPROACH: Anterior right side of the abdomen. Anterior left side of the abdomen. CATHETER: 214 New Zealander multipurpose drainage catheters.  FLUID OBTAINED: Yellow-colored fluid on the right and reddish colored fluid on the left was aspirated from the abdomen. Specimens were sent to the laboratory for further analysis. SEDATION: Versed 1.5 mg and fentanyl 75 mcg , IV; the patient was sedated for 29 minutes during this procedure and monitored with EKG and pulse ox monitoring devices by a registered nurse. Face-to-face time with the patient was 29 minutes. PROCEDURE: Signed informed consent was obtained prior to performing this procedure. The patient was placed on the CT scanner and sedated, as indicated above. ALL CT SCANS AT THIS FACILITY use dose modulation, iterative reconstruction, and/or weight-based dosing when appropriate to reduce radiation dose to as low as reasonably achievable. CT images were initially obtained to determine appropriate puncture sites. The skin was marked, prepped, and draped in a sterile fashion. Following local anesthesia and utilizing aseptic technique, needles were successfully passed into the fluid collections. A small amount of fluid was aspirated to confirm appropriate needle position. A guidewire was then passed through the needle followed by insertion of progressively larger dilators up to an port Western Melanie size. An 15 Swedish multi-side-hole drainage catheter was then passed over the wire and was coiled within the abscess pocket. The retaining suture was then locked in the standard fashion. Catheter was then hooked to a Prabhu-Close drainage bag and the catheter was flushed several times with sterile saline. The catheter was stabilized to the skin with a Percu-Stay device. A sample of the fluid was sent to laboratory for culture and sensitivity testing. Successful, uncomplicated placement of 14 Swedish drainage catheters into both sides of the abdomen. **This report has been created using voice recognition software. It may contain minor errors which are inherent in voice recognition technology. ** Final report electronically signed by  Moris Betts on 7/9/2021 3:38 PM    CT ABDOMEN PELVIS W IV CONTRAST Additional Contrast? None    Result Date: 7/9/2021  PROCEDURE: CT ABDOMEN PELVIS W IV CONTRAST CLINICAL INFORMATION: diffuse abdominal pain COMPARISON: CT abdomen and pelvis June 29, 2021 TECHNIQUE: 5 mm axial imaging through the abdomen and pelvis with IV contrast.  Coronal and sagittal reconstructions were performed. All CT scans at this facility use dose modulation, iterative reconstruction, and/or weight based dosing when appropriate to reduce the radiation dose to as low as reasonably achievable. CONTRAST: type and amount FINDINGS: LUNG BASES: Moderate bilateral pleural effusions are seen with adjacent atelectasis. LIVER/GALLBLADDER/BILIARY TREE: The liver measures 19.7 cm in craniocaudad dimension. There is redemonstration of a subcentimeter low-density focus in the lateral aspect of the posterior segment of the right lobe of the liver. The gallbladder is unremarkable except for prior cholecystectomy with elevated gallbladder pathology. PANCREAS/SPLEEN: Unremarkable. ADRENAL GLANDS: Unremarkable. KIDNEYS: Wedge-shaped bands of hypoenhancement are seen in both kidney, not present previously, appearance consistent with pyelonephritis. The kidneys overall do not appear particularly swollen at this time. . BOWEL: 1. Nonobstructive. There is diffuse thickening of the ileal loops, extending significantly more than previous, but the degree of thickness is not as severe as previous, particularly the terminal ileum. The colon is collapsed. The walls of the ascending colon and transverse colon appear inflamed and mildly thickened. Thickening of the walls of duodenal is also seen, with enhancement, also changed from previous. FREE AIR/FREE FLUID/INFLAMMATION: Large perihepatic collection with trapped air bubbles seen extending from dome of the liver through to the pelvis, measures approximately 4.5 cm in its largest AP dimension.  A second collection is seen in the left lower quadrant, measures approximately 4.7 cm in largest AP dimension, approximately 13.5 cm in craniocaudad dimension and connects across the lower abdomen upper pelvis to the perihepatic collection (coronal images 22-27). Smaller collections are seen in the pelvis centered at the periphery in a U shaped configuration, best demonstrated in the coronal plane and does not appear communicate with the larger, aforementioned collection. There is fluid throughout the mesentery. LYMPHADENOPATHY: 1. No pathologically enlarged lymph nodes. ABDOMINAL AORTA: Unremarkable. PELVIS: 1. U-shaped abscess centered around the periphery. Mesenteric inflammation. . ABDOMINAL WALL: Diffuse abdominal wall edema, with fluid density in the fascial planes, extending through to the imaged thigh. MUSCULOSKELETAL: Unremarkable lumbar spine and joints of the pelvis. No sign of infection. Levo curve of the lumbar spine. OTHER: None. 1.  Interval development of large perihepatic collection with trapped air bubbles extending from dome of the liver through to the lower abdomen/upper pelvis, measures approximately 4.5 cm in its largest AP dimension. A second collection is seen in the left lower quadrant, measures approximately 4.7 cm in largest AP dimension, approximately 13.5 cm in craniocaudad dimension and connects across the lower abdomen upper pelvis to the perihepatic collection, consistent with abscesses. Smaller collections are seen in the pelvis centered at the periphery in a U shaped configuration, best demonstrated in the coronal plane and does not appear to communicate with the larger, aforementioned collection. 2.  Mesenteric induration and free fluid. 3.  .Moderate bilateral pleural effusions with adjacent atelectasis. **This report has been created using voice recognition software. It may contain minor errors which are inherent in voice recognition technology. ** Final report electronically signed by  Mamie HERRERA Paty on 7/9/2021 10:53 AM    CT ABSCESS DRAINAGE SOFT TISSUE    Result Date: 7/9/2021  CT-GUIDED ABSCESS DRAINAGE PERFORMED BY: Misti Duff. Rupali Saha M.D. [facility] [place]: Fluid collections throughout the abdomen. One is within the right side and one is within the left side of the abdomen. APPROACH: Anterior right side of the abdomen. Anterior left side of the abdomen. CATHETER: 214 Spanish multipurpose drainage catheters. FLUID OBTAINED: Yellow-colored fluid on the right and reddish colored fluid on the left was aspirated from the abdomen. Specimens were sent to the laboratory for further analysis. SEDATION: Versed 1.5 mg and fentanyl 75 mcg , IV; the patient was sedated for 29 minutes during this procedure and monitored with EKG and pulse ox monitoring devices by a registered nurse. Face-to-face time with the patient was 29 minutes. PROCEDURE: Signed informed consent was obtained prior to performing this procedure. The patient was placed on the CT scanner and sedated, as indicated above. ALL CT SCANS AT THIS FACILITY use dose modulation, iterative reconstruction, and/or weight-based dosing when appropriate to reduce radiation dose to as low as reasonably achievable. CT images were initially obtained to determine appropriate puncture sites. The skin was marked, prepped, and draped in a sterile fashion. Following local anesthesia and utilizing aseptic technique, needles were successfully passed into the fluid collections. A small amount of fluid was aspirated to confirm appropriate needle position. A guidewire was then passed through the needle followed by insertion of progressively larger dilators up to an port Western Melanie size. An 15 Spanish multi-side-hole drainage catheter was then passed over the wire and was coiled within the abscess pocket. The retaining suture was then locked in the standard fashion.  Catheter was then hooked to a Prabhu-Close drainage bag and the catheter was flushed several times with sterile saline. The catheter was stabilized to the skin with a Percu-Stay device. A sample of the fluid was sent to laboratory for culture and sensitivity testing. Successful, uncomplicated placement of 14 Taiwanese drainage catheters into both sides of the abdomen. **This report has been created using voice recognition software. It may contain minor errors which are inherent in voice recognition technology. ** Final report electronically signed by Dr June Nichols on 7/9/2021 4:18 PM    CT ABSCESS DRAINAGE SOFT TISSUE    Result Date: 7/9/2021  CT-GUIDED ABSCESS DRAINAGE PERFORMED BY: Daryl Taveras. STERLING Brunner Block: Fluid collections throughout the abdomen. One is within the right side and one is within the left side of the abdomen. APPROACH: Anterior right side of the abdomen. Anterior left side of the abdomen. CATHETER: 214 Taiwanese multipurpose drainage catheters. FLUID OBTAINED: Yellow-colored fluid on the right and reddish colored fluid on the left was aspirated from the abdomen. Specimens were sent to the laboratory for further analysis. SEDATION: Versed 1.5 mg and fentanyl 75 mcg , IV; the patient was sedated for 29 minutes during this procedure and monitored with EKG and pulse ox monitoring devices by a registered nurse. Face-to-face time with the patient was 29 minutes. PROCEDURE: Signed informed consent was obtained prior to performing this procedure. The patient was placed on the CT scanner and sedated, as indicated above. ALL CT SCANS AT THIS FACILITY use dose modulation, iterative reconstruction, and/or weight-based dosing when appropriate to reduce radiation dose to as low as reasonably achievable. CT images were initially obtained to determine appropriate puncture sites. The skin was marked, prepped, and draped in a sterile fashion. Following local anesthesia and utilizing aseptic technique, needles were successfully passed into the fluid collections.  A small amount of fluid was aspirated to confirm appropriate needle position. A guidewire was then passed through the needle followed by insertion of progressively larger dilators up to an port Western Melanie size. An 15 Kosovan multi-side-hole drainage catheter was then passed over the wire and was coiled within the abscess pocket. The retaining suture was then locked in the standard fashion. Catheter was then hooked to a Prabhu-Close drainage bag and the catheter was flushed several times with sterile saline. The catheter was stabilized to the skin with a Percu-Stay device. A sample of the fluid was sent to laboratory for culture and sensitivity testing. Successful, uncomplicated placement of 14 Kosovan drainage catheters into both sides of the abdomen. **This report has been created using voice recognition software. It may contain minor errors which are inherent in voice recognition technology. ** Final report electronically signed by Dr Ben Ann on 7/9/2021 4:18 PM      DVT prophylaxis: [x] Lovenox                                 [] SCDs                                 [] SQ Heparin                                 [] Encourage ambulation           [] Already on Anticoagulation     Code Status: Full Code    Tele:   [] yes             [x] no    Active Hospital Problems    Diagnosis Date Noted    Severe malnutrition ([facility] [address] [E43] 07/13/2021     Class: Acute    Crohn's disease of small intestine with abscess ([address] [K50.014]     Intra-abdominal abscess ([address] [K65.1] 07/09/2021       Electronically signed by PRAVEENA Gordon CNP on 7/19/2021 at 7:43 AM

## 2021-07-19 NOTE — PROGRESS NOTES
Comprehensive Nutrition Assessment    Type and Reason for Visit:  Reassess (PO/PPN Monitor)    Nutrition Recommendations/Plan:   *Recommend weaning and discontinuing PPN per General Surgery. *Continue current diet as ordered. *Pt declines all ONS and routine snacks during LOS.     Nutrition Assessment: Pt improving from a nutritional standpoint AEB pt tolerating Regular diet with no nausea/emesis consuming 25-50% of meals and 3-in-1 PPN is meeting ~50% estimated calorie and 100% of protein needs; however, plan wean PPN today per General Surgery. Remains at risk for further nutritional compromise r/t severe malnutrition, admit d/t intra-abdominal abscess s/p percutaneous drain placement x2 and bilateral pleural effusions; 7/13/21: exploratory laparotomy with washout and drain placement; Full Liquid diet at present with need for PPN to meet estimated nutritional needs and underlying medical condition (hx Crohn's disease). Nutrition recommendations/interventions as per above. Malnutrition Assessment:  Malnutrition Status:  Severe malnutrition    Context:  Acute Illness     Findings of the 6 clinical characteristics of malnutrition:  Energy Intake:  7 - 50% or less of estimated energy requirements for 5 or more days  Weight Loss:  No significant weight loss     Body Fat Loss:  7 - Moderate body fat loss Orbital   Muscle Mass Loss:  7 - Moderate muscle mass loss Temples (temporalis)  Fluid Accumulation:  No significant fluid accumulation Extremities   Strength:  Not Performed    Estimated Daily Nutrient Needs:  Energy (kcal):  ~1290 kcal/day (~30 kcal/kg); Weight Used for Energy Requirements:   (43 kg)     Protein (g):  52-65 g/day (1.2-1.5g/kg); Weight Used for Protein Requirements:   (43 kg)        Fluid (ml/day):  1290+ mL/day (~30+ mL/kg);  Method Used for Fluid Requirements:  1 ml/kcal      Nutrition Related Findings: Admit due to intra-abdominal abscess s/p percutaneous drain placement x 2 and bilateral pleural effusions, 7/13/21: exploratory laparotomy with washout; Patient seen- PPN infusing at 75ml/ hour- plan to wean and discontinue PPN per General Surgery, pt reports tolerating Regular diet this morning with no N/V consuming 25-50% of most meals; pt declines all ONS and routine snacks during LOS; last BM x5 on 7/18. Medications include: pepcid, flagyl and reglan. Pt reports poor appetite consuming less than 50% of meals over past week, states was following low fiber diet and just snacked occasionally throughout the day. Wounds:  Surgical Incision (7/13/21: abdomen incision: exploratory laparotomy with washout and drain placement)       Current Nutrition Therapies:    ADULT DIET; Regular  PN-Adult 3 IN 1 Peripheral Line (Custom)    Anthropometric Measures:  · Height: 4' 11\" (149.9 cm)  · Current Body Weight: 95 lb 8 oz (43.3 kg) (7/13; bedscale; no edema noted)   · Admission Body Weight: 95 lb 8 oz (43.3 kg) (7/13; bedscale; no edema noted)    · Usual Body Weight:  (Per EMR: 94 lb 3.2 oz on 6/29/21)     · Ideal Body Weight: 95 lbs  · BMI: 19.3   · BMI Categories: Normal Weight (BMI 18.5-24. 9)       Nutrition Diagnosis:   · Severe malnutrition, In context of acute illness or injury related to inadequate protein-energy intake as evidenced by poor intake prior to admission, moderate loss of subcutaneous fat, moderate muscle loss    Nutrition Interventions:   Food and/or Nutrient Delivery:  Continue Current Diet, Vitamin Supplement, Discontinue Parenteral Nutrition  Nutrition Education/Counseling:  Education initiated (Encouraged po intake of meals at best effort)   Coordination of Nutrition Care:  Continue to monitor while inpatient    Goals:  Pt will conume and/or tolerate adequate nutrition support to meet 75% or more of estimated nutritional needs during LOS until able to transition to only po feeds.        Nutrition Monitoring and Evaluation:   Behavioral-Environmental Outcomes:  None Identified Food/Nutrient Intake Outcomes:  Diet Advancement/Tolerance, Food and Nutrient Intake, Supplement Intake, Vitamin/Mineral Intake  Physical Signs/Symptoms Outcomes:  Biochemical Data, Skin, Weight, Nutrition Focused Physical Findings, Fluid Status or Edema, GI Status     Discharge Planning:    Continue current diet     Electronically signed by Lin Haddad, MS, RD, LD on 7/19/21 at 10:01 AM EDT    Contact: [phone]

## 2021-07-19 NOTE — PROGRESS NOTES
Progress note: Infectious diseases    Patient - Thomas Hamilton,  Age - 36 y.o.    - 1990      Room Number - 5K-11/011-A   MRN -  [id number]   [facility]  Date of Admission -  2021  8:17 AM    SUBJECTIVE:   She is feeling better. Bowel movement + no nausea or vomting. Tolerating oral feeding. OBJECTIVE   VITALS    height is 4' 11\" (1.499 m) and weight is 90 lb (40.8 kg). Her oral temperature is 98.5 °F (36.9 °C). Her blood pressure is 106/69 and her pulse is 100. Her respiration is 18 and oxygen saturation is 98%. Wt Readings from Last 3 Encounters:   21 90 lb (40.8 kg)   21 94 lb 3.2 oz (42.7 kg)   10/11/18 118 lb (53.5 kg)       I/O (24 Hours)    Intake/Output Summary (Last 24 hours) at 2021 1221  Last data filed at 2021 0347  Gross per 24 hour   Intake 2778. 58 ml   Output 0 ml   Net 2778. 58 ml       General Appearance  Awake, alert, oriented,  not  In acute distress  HEENT - normocephalic, atraumatic, slighlty pale conjunctiva,  anicteric sclera  Neck - Supple, no mass. Lungs -  Bilateral   air entry,  Diminished  breath sound. Cardiovascular - Heart sounds are normal.     Abdomen - soft,clean dressing, non tender  Neurologic -oriented. Skin - No bruising or bleeding.   Extremities - No edema, no cyanosis, clubbing     MEDICATIONS:      metoclopramide  5 mg Intravenous Q8H    sodium chloride flush  5-40 mL Intravenous 2 times per day    enoxaparin  40 mg Subcutaneous Daily    levofloxacin  750 mg Intravenous Q24H    insulin lispro  0-12 Units Subcutaneous Q6H    metroNIDAZOLE  500 mg Intravenous Q8H    famotidine (PEPCID) injection  20 mg Intravenous BID      PN-Adult  3 IN 1 Central Line (Custom) 75 mL/hr at 21 1848    sodium chloride 20 mL/hr at 21 0939    sodium chloride       HYDROmorphone **OR** HYDROmorphone, sodium chloride flush, sodium chloride, ondansetron **OR** ondansetron, HYDROcodone 5 mg - acetaminophen **OR** HYDROcodone 5 mg - acetaminophen, ondansetron **OR** ondansetron      LABS:     CBC:   Recent Labs     21  0553   WBC 13.6*   HGB 11.4*        BMP:    Recent Labs     21  0553 21  0558 21  0721    141 140   K 4.2 4.0 3.9    103 103   CO2 30 29 30   BUN 10 11 15   CREATININE 0.4 0.3* 0.4   GLUCOSE 97 99 105     Calcium:  Recent Labs     21  07   CALCIUM 8.9     Ionized Calcium:No results for input(s): IONCA in the last 72 hours. Magnesium:  Recent Labs     21  0721   MG 1.9     Phosphorus:  Recent Labs     2121   PHOS 3.8     BNP:No results for input(s): BNP in the last 72 hours.   Glucose:  Recent Labs     21  0010 21  0558 21  1156   POCGLU 82 102 125*         Problem list of patient:     Patient Active Problem List   Diagnosis Code    Single delivery by  O82    Abdominal pain R10.9    Crohn's disease of large intestine without complication ([address] L47.29    Intra-abdominal abscess ([address] K65.1    Crohn's disease of small intestine with abscess ([address] K50.014    Severe malnutrition ([address] E43         ASSESSMENT/PLAN   Intraabdominal abscess : she had I and D  Crohn's disease exacerbation: better  Will discharge with oral antibiotic for one wk  Shannon Humphresy MD, MD, FACP 2021 12:21 PM

## 2021-07-20 NOTE — CARE COORDINATION
Late entry, patient discharged to home last evening with no services added/requested. 7/20/21, 7:28 AM EDT    Patient goals/plan/ treatment preferences discussed by  and . Patient goals/plan/ treatment preferences reviewed with patient/ family. Patient/ family verbalize understanding of discharge plan and are in agreement with goal/plan/treatment preferences. Understanding was demonstrated using the teach back method. AVS provided by RN at time of discharge, which includes all necessary medical information pertaining to the patients current course of illness, treatment, post-discharge goals of care, and treatment preferences.

## 2021-07-21 LAB
ANAEROBIC CULTURE: NORMAL
BODY FLUID CULTURE, STERILE: NORMAL
GRAM STAIN RESULT: NORMAL

## 2021-07-21 NOTE — DISCHARGE SUMMARY
Miguel Aguilar [address] [facility]       Pt Name: Puneet Tripp  MRN: [id number]  YOB: 1990  Primary Care Physician: Micheline Berger MD    Admit date:  7/9/2021  8:17 AM      Discharge date:  7/19/2021  5:42 PM    Admitting Diagnosis:   1. Intra-abdominal free air of unknown etiology    2. Generalized abdominal pain    3. Leukocytosis, unspecified type    4. Crohn's disease of small intestine with abscess ([address]        Discharge Diagnosis:   1. Intra-abdominal free air of unknown etiology    2. Generalized abdominal pain    3. Leukocytosis, unspecified type    4. Crohn's disease of small intestine with abscess ([address]    5. Status post exploratory laparotomy with washout and drain placement x2    Admitting Service: General Surgery, Karen Goss MD.    Consultants:  Jose Carlos Bernard and Hospitalist     History and Physical:  Pt Name: Puneet Tripp  MRN: [id number]  YOB: 1990  Date of evaluation: 7/9/2021  Primary Care Physician: Micheline Berger MD  Patient evaluated at the request of  Dr. Zackery Melchor  Reason for evaluation: Abdominal pain  IMPRESSIONS   1. Large intraabdominal abscesses  2. Bilateral pleural effusions  3. Leukocytosis secondary to #1 and also on prednisone  4. Elevated liver ezymes  5.  has a past medical history of Crohn's colitis ([address] PLANS   1. NPO  2. IV fluids  3. IR consulted for perc drains to be placed  4. IV antibiotics  5. Infectious disease consult  6. Pain control  7. Antiemetics  8. Repeat labs in AM  9. Hold prednisone and Humira at this time, consider GI consult in AM  10. DVT prophylaxis-SCDs  SUBJECTIVE   History of Chief Complaint:    Audra Foster is a 31 y. o.female who presents with abdominal pain. Stated the pain started a couple weeks ago and she was recently admitted to [facility] for reported Crohn's flareup. She was started on steroids and reported was supposed to start Humira today.   Patient stated her GI doctor is Dr. Rafa Chavira. Patient stated she was discharge on  and pain was manageable until last night around 1 AM the pain became intolerable and she came to the emergency room for evaluation. Patient endorsed having 8/10 abdominal pain but denied any other acute pain or complaints. She denied any headaches, lightheadedness, dizziness, chest pain, shortness of breathing, nausea/vomiting, constipation, hematochezia, dysuria, and paresthesias. She denied any acute fevers did endorse recent night sweats. On exam patient's abdomen was distended, firm, with significant tenderness palpation and guarding. CT abdomen pelvis revealed interval development of large perihepatic collection with trapped air bubbles extending from the dome of the liver through the lower abdomen/upper pelvis measuring 4.5 cm in its largest AP dimension. CT also noted a second collection seen in the lower left quadrant measuring 4.7 cm in its largest AP dimension, approximately 13.5 cm in craniocaudal dimension that connects across the lower abdominal/upper pelvis into the perihepatic collection consistent with abscesses. A smaller collection was also seen in the pelvis centered at the periphery all in a U-shaped confirmation which does not appear to communicate with larger collections above. Patient also noted to have moderate bilateral pleural effusions with adjacent atelectasis. Discussed surgical intervention versus IR drain placement. Plan at this time is to have interventional radiology place percutaneous drains. Case discussed with interventional radiologist, Dr. Lelo Levin. Care coordinated with general surgeon on-call, Dr. Carlos Conrad.     Past Medical History   has a past medical history of Crohn's colitis ([facility] [address] Past Surgical History   has a past surgical history that includes Dike tooth extraction and pr  delivery only (N/A, 10/12/2018).   Medications  Home Medications           Prior to Admission medications Medication Sig Start Date End Date Taking? Authorizing Provider   adalimumab (HUMIRA) 40 MG/0.8ML injection Inject 0.8 mLs into the skin once for 1 dose 21   Deedee Lopez MD   predniSONE (DELTASONE) 20 MG tablet Take 1 tablet by mouth 2 times daily for 10 days 21   Deedee Lopez MD   hyoscyamine (OSCIMIN) 125 MCG TBDP dispersible tablet Take 1 tablet by mouth every 4 hours as needed (abd cramping) 21     Deedee Lopez MD   Prenatal Vit-Fe Fumarate-FA (PRENATAL 19 PO) Take by mouth       Historical Provider, MD       Scheduled Meds:  Scheduled Medications    levofloxacin  500 mg Intravenous Once         Continuous Infusions:  Infusions Meds        PRN Meds:. Allergies  is allergic to duricef [cefadroxil] and penicillins. Family History  family history includes Other in her mother. Social History   reports that she has never smoked. She has never used smokeless tobacco. She reports that she does not drink alcohol and does not use drugs. Review of Systems:  General Denies any fever or chills  HEENT Denies any headaches, lightheadedness, dizziness  Resp Denies any shortness of breath, cough or wheezing  Cardiac Denies any chest pain, palpitations  GI admits abdominal pain, denies any nausea/vomiting, constipation, or hematochezia   Denies any frequency, dysuria, hematuria  Heme Denies bruising or bleeding easily  Endocrine Denies any history of diabetes or thyroid disease  Neuro Denies any focal motor or sensory deficits  OBJECTIVE   CURRENT VITALS:  height is 4' 11\" (1.499 m) and weight is 90 lb (40.8 kg). Her oral temperature is 98.7 °F (37.1 °C). Her blood pressure is 122/85 and her pulse is 114. Her respiration is 16 and oxygen saturation is 95%. Body mass index is 18.18 kg/m².   Temperature Range (24h):Temp: 98.7 °F (37.1 °C) Temp  Av.7 °F (37.1 °C)  Min: 98.7 °F (37.1 °C)  Max: 98.7 °F (37.1 °C)  BP Range (84J): Systolic (39FCH), JABIER:876 , Min:113 , Max:122 Diastolic (09MMT), KAD:97, Min:74, Max:85     Pulse Range (24h): Pulse  Av.7  Min: 114  Max: 134  Respiration Range (24h): Resp  Av  Min: 16  Max: 18  Current Pulse Ox (24h):  SpO2: 95 %  Pulse Ox Range (24h):  SpO2  Av %  Min: 94 %  Max: 96 %  Oxygen Amount and Delivery:    CONSTITUTIONAL: Alert and oriented times 3, no acute distress and cooperative to examination with proper mood and affect. SKIN: Skin color, texture, turgor normal. No rashes or lesions. HEENT: Head is normocephalic, atraumatic. EOMI, PERRL. NECK: Supple, symmetrical, trachea midline, no adenopathy  CHEST/LUNGS: chest symmetric with normal A/P diameter, normal respiratory rate and rhythm, lungs clear to auscultation without wheezes, rales or rhonchi. No accessory muscle use. CARDIOVASCULAR:   Tachycardia and rhythm without murmur, gallop or rub. Normal S1 and S2. Carotid and femoral pulses 2+/4 and equal bilaterally. ABDOMEN: Firm, distended, significant tenderness to palpation with guarding. Normal active bowel sounds. RECTAL: deferred, not clinically indicated  NEUROLOGIC: There are no focalizing motor or sensory deficits. CN II-XII are grossly intact. Graylon Gary EXTREMITIES: no cyanosis, no clubbing and no edema. LABS          Recent Labs     21  0840   WBC 37.1*   HGB 12.9   HCT 42.1   *      K 4.1   CL 98   CO2 27   BUN 10   CREATININE 0.3*   CALCIUM 8.8   AST 69*   ALT 79*   BILITOT 1.2      RADIOLOGY      CT ABDOMEN PELVIS W IV CONTRAST Additional Contrast? None   Final Result   1. Interval development of large perihepatic collection with trapped air bubbles extending from dome of the liver through to the lower abdomen/upper pelvis, measures approximately 4.5 cm in its largest AP dimension.  A second collection is seen in the    left lower quadrant, measures approximately 4.7 cm in largest AP dimension, approximately 13.5 cm in craniocaudad dimension and connects across the lower abdomen upper pelvis to the perihepatic collection, consistent with abscesses. Smaller collections    are seen in the pelvis centered at the periphery in a U shaped configuration, best demonstrated in the coronal plane and does not appear to communicate with the larger, aforementioned collection. 2.  Mesenteric induration and free fluid. 3.  .Moderate bilateral pleural effusions with adjacent atelectasis.             **This report has been created using voice recognition software. It may contain minor errors which are inherent in voice recognition technology. **       Final report electronically signed by Dr. Melody Joyner on 7/9/2021 10:53 AM       XR ACUTE ABD SERIES CHEST 1 VW   Final Result   1. Small right effusion with right base consolidation. .   2. Unremarkable supine and upright views of the abdomen. 3. Hepatomegaly.               **This report has been created using voice recognition software. It may contain minor errors which are inherent in voice recognition technology. **       Final report electronically signed by Dr. Melody Joyner on 7/9/2021 9:26 AM             Thank you for the interesting evaluation. Further recommendations to follow.     Electronically signed by Adama Morris PA-C on 7/9/2021 at 11:46 AM      Patient seen and examined independently by me earlier today in the emergency department. Above discussed and I agree with SARA Hackett. See my additional comments below for updated orders and plan. Labs, cultures, and radiographs where available were reviewed. I discussed patient concerns with the patient's nurse and instructions were given. Please see our orders for the updated patient care plan. -Patient presented this morning to the emergency department secondary to generalized abdominal pain and distention. Has been found to have a large abscess. Recently discharged due to active acute Crohn's disease inflammation at the terminal ileum. Symptoms have been going on now for a few weeks. Long discussion with patient and family at the bedside in the emergency department about exploratory laparotomy with washout and drain placement. Also discussed interventional radiology drain placement initially to see how she does. Attempt drain placements today. NPO. IV fluid hydration. IV antibiotics. Hold steroids and Humira. GI consultation for further medical management after abscesses improve. Patient and family well aware that if no improvement/worsens then surgical intervention. A.m. labs. Serial abdominal examinations. Pain control.     Electronically signed by Joel Padilla MD on 7/9/21 at 8:56 PM EDT    Procedures/Diagnostic Tests:    CT abdomen/pelvis with intravenous contrast       Comparison:  CT,[facility]  - CT ABDOMEN PELVIS W IV CONTRAST  - 07/09/2021    10:14 AM EDT       Findings:   Bilateral pleural effusions and compressive atelectasis is noted.  The    pleural effusions are similar to slightly larger compared with the    previous study.  The heart is grossly unremarkable.       Right hepatic lobe of the liver is somewhat heterogeneous. Sunil Lava is a 1.1    cm low-attenuation lesion in the right hepatic lobe on image 29 which is    nonspecific.  The gallbladder and biliary system are within normal limits. The spleen is unremarkable. The pancreas is within normal limits. The adrenal glands are unremarkable. There is no hydronephrosis or nephrolithiasis.    There is a drainage catheter in the abdomen on the right.  The fluid and    gas collection on the right has decreased in size and now measures    approximately 10 cm x 1.8 cm compared with 13 cm x 5.5 cm on the previous    study.  There is a catheter in the fluid collection in the left hemipelvis    as well.  That collection previously measured 11.5 cm x 4.6 cm and now    measures 9.5 cm x 3.8 cm.  There is a separate fluid collection in the    posterior cul-de-sac which is slightly smaller compared to the prior    study.     The appendix is not definitively seen.  The pelvic structures are    unremarkable.       No acute fracture or dislocation.           Impression   Impression:   1.  Slight decreased size of the intra-abdominal fluid collections.  The    catheters are in place as detailed above. 2.  Other incidental findings as above.       This document has been electronically signed by: Lina Rangel MD on    2021 07:12 AM       All CTs at this facility use dose modulation techniques and iterative    reconstructions, and/or weight-based dosing   when appropriate to reduce radiation to a low as reasonably achievable. OPERATIVE REPORT     PATIENT NAME: Monica Peterson                      :        1990  MED REC NO:   [id number]                           ROOM:       0011  ACCOUNT NO:   [de-identified]                           ADMIT DATE: 2021  PROVIDER:     Selina Kent M.D.     DATE OF PROCEDURE:  2021     PREOPERATIVE DIAGNOSES:  1. Generalized abdominal pain. 2.  Crohn's disease. 3.  Large intraabdominal abscess.     POSTOPERATIVE DIAGNOSES:  1. Generalized abdominal pain. 2.  Crohn's disease. 3.  Large intraabdominal abscess.     OPERATION PERFORMED:  Exploratory laparotomy with washout and drain  placement x2.     SURGEON:  Selina Kent MD     ASSISTANT:  Titus Cabrera. [place], [facility]     ANESTHESIA:  General/local.     ESTIMATED BLOOD LOSS:  40 mL.     DRAINS:  #19 round Jose drain x2.     COMPLICATIONS:  None.     DISPOSITION:  Stable to the recovery room.     INDICATIONS:  The patient is a 82-year-old female who was admitted  originally because of a large intraabdominal abscess. Most likely  perforated Crohn's disease. She was admitted a few weeks ago because of  acute Crohn's flare-up.   Attempt was made at percutaneous drainage which  drained a fair amount, but she continued to have abdominal bloating  along with generalized abdominal pain and retained abscess even though  both drains were in appropriate position. Both operative and  nonoperative intervention plans were discussed. Risks of surgery were  further discussed. Some of the risks included but were not limited to  bleeding, infection, the need for reoperation, severe chronic  postoperative pain or numbness, major vascular or nerve injury,  cardiopulmonary complications, anesthetic complications, seroma or  hematoma formation, wound breakdown, failure of the wounds to heal,  chronic pain, and death. After all of the questions were answered in  their entirety and the patient was completely aware of the current  situation, she and her family wished to proceed with surgery.     DESCRIPTION OF THE PROCEDURE:  After informed consent was signed and  placed on the chart, the patient was taken back to the operating room  and placed supine on the operating room table. General anesthesia was  induced. She tolerated this well throughout the case. All pressure  points were padded. She was on preoperative antibiotics. Bilateral  lower extremity sequential compression devices were placed prior to  incision. Her abdomen and pelvis were prepped and draped in usual  sterile standard fashion. A timeout occurred prior to the operation  which not only identified the patient but also the planned procedure to  be performed. At the end of the timeout, there were no questions or  concerns. I began the operation by making a vertical midline incision  with a 10-blade scalpel. Deepened through the deep dermal and  subcutaneous tissues. Linea alba was sharply dissected through and the  intraabdominal cavity was entered into. A large amount of purulence/pus  was then extracted of about 500 mL. It was clear that the field was  already contaminated. There was diffuse infection and inflammation  throughout the abdomen. Eventually, I was able to completely free up  the small bowel from the ligament of Treitz distally. No bowel injury  was identified. Omentum was able to be freed up which was mainly down  in the right lower quadrant. Pelvis was able to be freed up as well. Irrigation with Irrisept. Eventually, all of the purulence and pus were  extracted in its entirety and the abdomen overall looked clean even  though it was still very friable. Reevaluation of the abdomen never did  demonstrate any kind of obvious rupture. There was a lot of film on the  bowel especially down in the ileum and the terminal ileal region and  this was not attempted to be taken off as this was going to rip the  bowel in of itself. Most likely, the small perforation that had  occurred originally was already sealed over. Irrisept was then placed  again after hemostasis was assured and then wound closure table was then  brought out. Re-prep and draping and new instruments were brought up as  well. #19 round Jose drains were placed through the already made stab  incisions from the old percutaneous drains placed by Interventional  Radiology. These drains had already been removed. The right drain was  placed down into the pelvis wearing up over the left lateral paracolic  region. The left drain was actually placed down in the pelvis as well  and then back up along the right paracolic gutter. Both of these were  sutured with silk sutures x2. After all the irrigation was removed,  hemostasis assured, we then closed the fascia with a running #1  Stratafix x2. These were tied. Care was taken to avoid bowel injury. Subcutaneous tissues were irrigated with Irrisept. Skin reapproximated  with a running 3-0 Vicryl in subcuticular fashion. Less than 50 mL  blood loss. Tolerated the procedure well. Was easily brought out of  general anesthesia and transferred to the postanesthesia care unit in  stable condition.           Jyoti Hurd M.D. Hospital Course: Viktor Schafer is a 37-year old female patient who was admitted for abdominal pain.  She was just recently discharged from [facility]'s for a crohn's flare up and stated pain continued to increase as she was at home. CT demonstrated intra-abdominal abscesses. She was admitted to [facility] and she was taken for CT guided drain placements. She was placed on IV antibiotics and infectious disease was consulted. Hospitalist was also consulted for management of IV steroids. Humira is on hold per GI. CT scan was repeated on 7/12 and compared to scan on 7/9. There was only minimal improvement of abscess collections after drain placements. Options were to continue conservative management or surgery. Patient opted to procedure with surgery and on 7/13/21 she was taken for an abdominal washout. She clinically started to improve after surgery. Paient went from NPO with PPN up to regular diet without issues. Bowel function returned. Pain control was adequate and she was up moving well. Patient will remain off her GI medications until she is healed up from surgery. Home with oral antibiotics. Discharge Condition: stable    Disposition: Home    Labs:  Lab Results   Component Value Date    WBC 13.6 (H) 07/17/2021    HGB 11.4 (L) 07/17/2021    HCT 39.0 07/17/2021    MCV 85.3 07/17/2021     07/17/2021     Lab Results   Component Value Date     07/19/2021    K 3.9 07/19/2021    K 3.8 07/14/2021     07/19/2021    CO2 30 07/19/2021    BUN 15 07/19/2021    CREATININE 0.4 07/19/2021    GLUCOSE 105 07/19/2021    GLUCOSE 108 11/08/2019    CALCIUM 8.9 07/19/2021      Discharge Instructions:  Pt Name: Benton Stone Chester Record Number: [id number]  Today's Date: 7/19/2021    GENERAL ANESTHESIA OR SEDATION  1. Do not drive or operate hazardous machinery for 24 hours. 2. Do not make important business or personal decisions for 24 hours. 3. Do not drink alcoholic beverages or use tobacco for 24 hours. ACTIVITY INSTRUCTIONS:  [] Rest today. Resume light to normal activity tomorrow. [x] You may resume normal activity tomorrow.  Do not engage in strenuous activity that may place stress on your incision. [x] Do not drive for 3-5 days and avoid heavy lifting, tugging, pullings greater than 10-20 lbs until seen in the office. DIET INSTRUCTIONS:  [x]Begin with clear liquids. If not nauseated, may increase to a low-fat diet when you desire. Greasy and spicy foods are not advised. [x]Regular diet as tolerated. []Other:     MEDICATIONS  [x]Prescription sent with you to be used as directed. []Valium   [x]Norco   []Percocet   []Toradol   []Oxycontin     Do not drink alcohol or drive while taking these medications. You may experience dizziness or drowsiness with these medications. You may also experience constipation which can be relieved with stool softners or laxatives. - Take Colace 100 mg 1-2 tabs daily as needed for constipation  - Take MiraLax 1-2 cap fulls daily as needed for constipation    [x]You may resume your daily prescription medication schedule unless otherwise specified. []Do not take 325mg Aspirin or other blood thinners such as Coumadin or Plavix for 5 days. **Pain medication at discharge - use only as prescribed- refills may be available to you at your follow up appointments if needed and warranted. Narcotics should be used for only short term and we highly encouraged our patients to wean off appropriately and use other means for pain such as non pharmacologic measures and over the counter tylenol or ibuprofen if no restrictions apply. We do  know that surgical pain is real and will not hesitate to help eliminate some of your discomfort. However we will not be able to completely make you pain free and it is important to determine what pain level is tolerable for you **    WOUND/DRESSING INSTRUCTIONS:  Always ensure you and your care giver clean hands before and after caring for the wound. [x] Keep dressing clean and dry for 24 hours. Change when soiled or wet. [x] Allow steri-strips to fall off on their own.    [x] Ice operative site as needed for 20 minutes 4 times a day. [x] May wash over incision in shower in 24 hours, but do not soak in a bath.  [] Take sitz bath for 20 minutes twice daily and after bowel movements. [] Keep the abdominal binder in place during the day. May remove to shower and at night.  [] Remove packing from wound in 24 hours and replace daily. [] Empty JAYMIE drain daily and record the amounts. ABDOMINAL/LAPAROSCOPIC SURGERY  [x]You are encouraged to get up and move around as this helps with the circulation and speeds up the healing process. [x]Breath deeply and cough from time to time. This helps to clear your lungs and helps prevent pneumonia. [x]Supporting your incision with a pillow or your hand helps to minimize discomfort and pain. []Robotic/Laparoscopic patients may develop shoulder pain in the first 48 hours from the gas used during the procedure. FOLLOW-UP CARE. SPECIFICALLY WATCH FOR:   Fever over 101 degrees by mouth   Increased redness, warmth, hardness at operative site. Blood soaked dressing (small amounts of oozing may be normal.)   Increased or progressive drainage from the surgical area   Inability to urinate or blood in the urine   Pain not relieved by the medications ordered   Persistent nausea and/or vomiting, unable to retain fluids. Pain or swelling in your legs. Shortness of breath. FOLLOW-UP APPOINTMENT   []1 week   [x]2 weeks   []Other    Call my office if you have any problem that concerns you [phone]. After hours, you can reach the answering service via the office phone number. IF YOU NEED IMMEDIATE ATTENTION, GO TO THE EMERGENCY ROOM AND YOUR DOCTOR WILL BE CONTACTED. Norma Junior MD M.D. [facility]  [address]  [address] [id number]  [place], [facility]  Electronically signed 7/19/2021 at 6:46 AM    Discharge Medications:        Medication List      START taking these medications    HYDROcodone-acetaminophen 5-325 MG per tablet  Commonly known as: Norco  Take 1-2 tablets by mouth every 6 hours as needed for Pain for up to 30 doses. levoFLOXacin 750 MG tablet  Commonly known as: Levaquin  Take 1 tablet by mouth daily for 7 days     metroNIDAZOLE 500 MG tablet  Commonly known as: FLAGYL  Take 1 tablet by mouth 3 times daily for 7 days        CONTINUE taking these medications    hyoscyamine 125 MCG Tbdp dispersible tablet  Commonly known as: OSCIMIN  Take 1 tablet by mouth every 4 hours as needed (abd cramping)        STOP taking these medications    adalimumab 40 MG/0.8ML injection  Commonly known as: Humira     predniSONE 20 MG tablet  Commonly known as: [facility]           Where to Get Your Medications      These medications were sent to [address] [place] [address]  [address], [address] [facility]    Phone: [phone]   · HYDROcodone-acetaminophen 5-325 MG per tablet  · levoFLOXacin 750 MG tablet  · metroNIDAZOLE 500 MG tablet         Diet: General/Regular diet as tolerated    Activity: no lifting more than 10-20 lbs for next two weeks    Wound Care: as directed      Follow-up:  in the next few weeks with Gillian Moraes MD  Follow up with PRAVEENA Kohli CNP in 1-2 weeks.     PRAVEENA Kohli CNP, CNP   Electronincally signed 7/20/2021 at 10:09 PM    A total of 35 minutes was spent in preparing the patient for discharge with greater than 50% of the time involved with education, counseling and coordinating care

## 2021-07-28 ENCOUNTER — TELEPHONE (OUTPATIENT)
Dept: SURGERY | Age: 31
End: 2021-07-28

## 2021-07-29 ENCOUNTER — OFFICE VISIT (OUTPATIENT)
Dept: SURGERY | Age: 31
End: 2021-07-29

## 2021-07-29 VITALS
RESPIRATION RATE: 18 BRPM | SYSTOLIC BLOOD PRESSURE: 118 MMHG | DIASTOLIC BLOOD PRESSURE: 60 MMHG | HEART RATE: 115 BPM | TEMPERATURE: 97.8 F | OXYGEN SATURATION: 94 % | WEIGHT: 77.8 LBS | HEIGHT: 59 IN | BODY MASS INDEX: 15.68 KG/M2

## 2021-07-29 DIAGNOSIS — Z98.890 S/P EXPLORATORY LAPAROTOMY: Primary | ICD-10-CM

## 2021-07-29 PROCEDURE — 99024 POSTOP FOLLOW-UP VISIT: CPT | Performed by: NURSE PRACTITIONER

## 2021-07-29 RX ORDER — METRONIDAZOLE 500 MG/1
500 TABLET ORAL 3 TIMES DAILY
Qty: 15 TABLET | Refills: 0 | Status: SHIPPED | OUTPATIENT
Start: 2021-07-29 | End: 2021-08-09

## 2021-07-29 RX ORDER — LEVOFLOXACIN 750 MG/1
750 TABLET ORAL DAILY
Qty: 5 TABLET | Refills: 0 | Status: SHIPPED | OUTPATIENT
Start: 2021-07-29 | End: 2021-08-09

## 2021-07-29 ASSESSMENT — ENCOUNTER SYMPTOMS
VOICE CHANGE: 0
TROUBLE SWALLOWING: 0
ANAL BLEEDING: 0
EYE DISCHARGE: 0
FACIAL SWELLING: 0
VOMITING: 0
SORE THROAT: 0
EYE REDNESS: 0
CONSTIPATION: 0
ABDOMINAL PAIN: 0
BLOOD IN STOOL: 0
PHOTOPHOBIA: 0
RHINORRHEA: 0
NAUSEA: 0
DIARRHEA: 0
CHOKING: 0
COLOR CHANGE: 0
ABDOMINAL DISTENTION: 0
BACK PAIN: 0
APNEA: 0
WHEEZING: 0
EYE PAIN: 0
RECTAL PAIN: 0
SHORTNESS OF BREATH: 0
SINUS PRESSURE: 0
STRIDOR: 0
COUGH: 0
EYE ITCHING: 0
CHEST TIGHTNESS: 0

## 2021-07-29 NOTE — LETTER
[address] [facility], located within [facility]  [place] [address] [facility] [address]  Phone: [phone]  Fax: [phone]    PRAVEENA Escobar CNP        July 29, 2021     Patient: James Arechiga   YOB: 1990   Date of Visit: 7/29/2021       To Whom It May Concern: It is my medical opinion that Bethany Warner will remain off work for approximately 6 weeks from surgery date which was 7/14/21. She will remain on lifting restrictions for that time of no lifting over 10 lbs at this time. We are waiting on disability paperwork. If you have any questions or concerns, please don't hesitate to call.     Sincerely,        PRAVEENA Escobar CNP

## 2021-07-29 NOTE — PROGRESS NOTES
[address] [place] [address] [address] [facility] [address]  Dept: [phone]  Dept Fax: [phone]  Loc: [phone]    Visit Date: 7/29/2021    Tosha Morgan is a 36 y.o. female who presents today for:  Chief Complaint   Patient presents with    Post-Op Check     s/p Exploratory laparotomy with washout and drain placement x2-7/14/2021 having drainage from drain sites       HPI:     HPI    Mack Jurado is a 37-year old female patient who presents for JAYMIE drain site check. She is status post exploratory laparotomy with washout and drain placement x2 a little over 2 weeks ago. History of crohn's disease with intra-abdominal abscesses. patient lost her insurance and was off her Humira when she had her hospitalizations. Patient failed conservative management with IV antibiotics and IR drain placement so she was taken to OR. She was discharged home on 7/19/21. States she has been doing well and two days ago she started having drainage from right sided JAYMIE drain site. Drainage is yellowish and somewhat milky. Scant to small amount. JAYMIE drain site otherwise looks good without significant erythema. Left sided drain site healed with scab and no signs of infection. Midline incision with steri-strips and no signs of infection. She completed her course of oral antibiotics and tolerated well. She only has mild incisional pain which she is taking Ibuprofen. No generalized abdominal pain. Abdomen is much less distended and soft. Denies any SOB or chest pain. No fevers or chills. No urinary complaints. States her appetite is good. Trying to put on weight. No nausea or vomiting. Bowel function doing well. Going everyday without stool softeners. Sees her gastroenterologist in 2 weeks. Holding on all steroids and Humira at this time until healed from surgery.      Past Medical History:   Diagnosis Date    Crohn's colitis [facility])       Past Surgical History:   Procedure Laterality Date    CT DRAIN SOFT TISSUE ABSCESS  2021    CT DRAIN SOFT TISSUE ABSCESS 2021 [facility] CT SCAN    CT DRAIN SOFT TISSUE ABSCESS  2021    CT DRAIN SOFT TISSUE ABSCESS 2021 [facility] CT SCAN    LAPAROTOMY N/A 2021    LAPAROTOMY EXPLORATORY, WASHOUT, AND DRAIN PLACEMENT performed by Alejandro Avila MD at [address] N/A 10/12/2018     SECTION performed by Juan M Toussaint MD at [address] [facility] [address] [place]         Family History   Problem Relation Age of Onset    Other Mother        Social History     Tobacco Use    Smoking status: Never Smoker    Smokeless tobacco: Never Used   Substance Use Topics    Alcohol use: No      Current Outpatient Medications   Medication Sig Dispense Refill    metroNIDAZOLE (FLAGYL) 500 MG tablet Take 1 tablet by mouth 3 times daily for 5 days 15 tablet 0    levoFLOXacin (LEVAQUIN) 750 MG tablet Take 1 tablet by mouth daily for 5 days 5 tablet 0     No current facility-administered medications for this visit. Allergies   Allergen Reactions    Duricef [Cefadroxil] Anaphylaxis    Penicillins Rash       Subjective:     Review of Systems   Constitutional: Negative for activity change, appetite change, chills, diaphoresis, fatigue, fever and unexpected weight change. HENT: Negative for congestion, dental problem, drooling, ear discharge, ear pain, facial swelling, hearing loss, mouth sores, nosebleeds, postnasal drip, rhinorrhea, sinus pressure, sneezing, sore throat, tinnitus, trouble swallowing and voice change. Eyes: Negative for photophobia, pain, discharge, redness, itching and visual disturbance. Respiratory: Negative for apnea, cough, choking, chest tightness, shortness of breath, wheezing and stridor. Cardiovascular: Negative for chest pain, palpitations and leg swelling.    Gastrointestinal: Negative for abdominal distention, abdominal pain, anal bleeding, blood in stool, constipation, diarrhea, nausea, rectal pain and vomiting. Endocrine: Negative for cold intolerance, heat intolerance, polydipsia, polyphagia and polyuria. Genitourinary: Negative for decreased urine volume, difficulty urinating, dysuria, enuresis, flank pain, frequency, genital sores, hematuria and urgency. Musculoskeletal: Negative for arthralgias, back pain, gait problem, joint swelling, myalgias, neck pain and neck stiffness. Skin: Positive for wound (abdomen-steri strips Arturo site draining). Negative for color change, pallor and rash. Allergic/Immunologic: Negative for environmental allergies, food allergies and immunocompromised state. Neurological: Negative for dizziness, tremors, seizures, syncope, facial asymmetry, speech difficulty, weakness, light-headedness, numbness and headaches. Hematological: Negative for adenopathy. Does not bruise/bleed easily. Psychiatric/Behavioral: Negative for agitation, behavioral problems, confusion, decreased concentration, dysphoric mood, hallucinations, self-injury, sleep disturbance and suicidal ideas. The patient is not nervous/anxious and is not hyperactive. Objective:   /60 (Site: Left Upper Arm, Position: Sitting, Cuff Size: Medium Adult)   Pulse 115   Temp 97.8 °F (36.6 °C) (Axillary)   Resp 18   Ht 4' 11\" (1.499 m)   Wt 77 lb 12.8 oz (35.3 kg)   LMP 07/09/2021   SpO2 94%   BMI 15.71 kg/m²     Physical Exam  Vitals reviewed. Constitutional:       General: She is not in acute distress. Appearance: Normal appearance. She is well-developed. She is not ill-appearing or toxic-appearing. HENT:      Head: Normocephalic and atraumatic. Right Ear: Hearing and external ear normal.      Left Ear: Hearing and external ear normal.      Nose: Nose normal.      Mouth/Throat:      Mouth: Mucous membranes are not pale, not dry and not cyanotic.    Eyes:      General: Lids are normal.   Neck:      Trachea: Trachea and phonation normal.   Cardiovascular: Rate and Rhythm: Normal rate and regular rhythm. Pulses: Normal pulses. Heart sounds: S1 normal and S2 normal.   Pulmonary:      Effort: Pulmonary effort is normal. No tachypnea, bradypnea, accessory muscle usage or respiratory distress. Breath sounds: Normal breath sounds. No decreased breath sounds, wheezing or rales. Chest:      Chest wall: No tenderness. Abdominal:      General: Bowel sounds are normal. There is no distension. Palpations: Abdomen is soft. There is no mass. Tenderness: There is abdominal tenderness. Musculoskeletal:         General: No tenderness. Normal range of motion. Cervical back: Normal range of motion and neck supple. Skin:     General: Skin is warm and dry. Findings: No abrasion, bruising, burn, ecchymosis, erythema, laceration, lesion or rash. Neurological:      Mental Status: She is alert and oriented to person, place, and time. Motor: No tremor, atrophy or abnormal muscle tone. Coordination: Coordination normal.      Gait: Gait normal.      Deep Tendon Reflexes: Reflexes are normal and symmetric. Psychiatric:         Speech: Speech normal.         Behavior: Behavior normal.         Thought Content:  Thought content normal.       Lab Results   Component Value Date    WBC 13.6 (H) 2021    HGB 11.4 (L) 2021    HCT 39.0 2021    MCV 85.3 2021     2021     Lab Results   Component Value Date     2021    K 3.9 2021    K 3.8 2021     2021    CO2 30 2021    BUN 15 2021    CREATININE 0.4 2021    GLUCOSE 105 2021    GLUCOSE 108 2019    CALCIUM 8.9 2021      Patient Active Problem List   Diagnosis    Single delivery by     Abdominal pain    Crohn's disease of large intestine without complication ([address]    Intra-abdominal abscess ([address]    Crohn's disease of small intestine with abscess ([address]    Severe malnutrition

## 2021-08-09 ENCOUNTER — OFFICE VISIT (OUTPATIENT)
Dept: SURGERY | Age: 31
End: 2021-08-09

## 2021-08-09 VITALS
OXYGEN SATURATION: 90 % | DIASTOLIC BLOOD PRESSURE: 78 MMHG | RESPIRATION RATE: 18 BRPM | HEIGHT: 59 IN | BODY MASS INDEX: 14.84 KG/M2 | TEMPERATURE: 97.9 F | HEART RATE: 89 BPM | WEIGHT: 73.6 LBS | SYSTOLIC BLOOD PRESSURE: 120 MMHG

## 2021-08-09 DIAGNOSIS — Z98.890 S/P EXPLORATORY LAPAROTOMY: Primary | ICD-10-CM

## 2021-08-09 PROCEDURE — 99024 POSTOP FOLLOW-UP VISIT: CPT | Performed by: NURSE PRACTITIONER

## 2021-08-09 RX ORDER — ONDANSETRON 4 MG/1
4 TABLET, FILM COATED ORAL EVERY 6 HOURS PRN
Qty: 30 TABLET | Refills: 0 | Status: SHIPPED | OUTPATIENT
Start: 2021-08-09

## 2021-08-09 ASSESSMENT — ENCOUNTER SYMPTOMS
EYE PAIN: 0
PHOTOPHOBIA: 0
SORE THROAT: 0
CHEST TIGHTNESS: 0
VOICE CHANGE: 0
COUGH: 0
CONSTIPATION: 0
COLOR CHANGE: 0
RHINORRHEA: 0
VOMITING: 0
TROUBLE SWALLOWING: 0
SHORTNESS OF BREATH: 0
STRIDOR: 0
BLOOD IN STOOL: 0
EYE REDNESS: 0
SINUS PRESSURE: 0
APNEA: 0
EYE DISCHARGE: 0
CHOKING: 0
EYE ITCHING: 0
ANAL BLEEDING: 0
FACIAL SWELLING: 0
BACK PAIN: 0
ABDOMINAL DISTENTION: 0
RECTAL PAIN: 0
WHEEZING: 0
ABDOMINAL PAIN: 0
DIARRHEA: 0

## 2021-08-09 NOTE — PROGRESS NOTES
[address] [place] [address] [address] [facility] [address]  Dept: [phone]  Dept Fax: [phone]  Loc: [phone]    Visit Date: 8/9/2021    Kirk Claude is a 36 y.o. female who presents today for:  Chief Complaint   Patient presents with    Post-Op Check     2 week f/u--s/p Exploratory laparotomy with washout and drain placement x2 7/14/2021       HPI:     HPI    Teagan Taveras is a 37-year old female patient who presents for follow up. She is status post exploratory laparotomy with washout and drain placement x2 a little over 4 weeks ago. History of crohn's disease with intra-abdominal abscesses. Patient lost her insurance and was off her Humira when she had her hospitalizations. Patient failed conservative management with IV antibiotics and IR drain placement so she was taken to OR. She was discharged home on 7/19/21. Presents with . Right sided JAYMIE drain site still having bleeding issues. Not healed but no signs of infection. There is some exposed tissue but no signs of necrosis. Tender to touch. Pictured below. Left drain site healed. Midline incision with steri-strips and no signs of infection. She has been off antibiotics for two weeks. States she feels overall better but started having nausea and dry heaves the last few days. She states this is \"normal\" for her when she has a crohn's flare up. Supposed to see GI on Thursday to discuss going back on her medication. Appetite is good but she has had some emesis recently. Denies any abdominal pain though or bloating. Concerned with weight because she is not gaining. No abdominal bloating. Denies any SOB or chest pain. No fevers or chills. No urinary complaints. Bowel function doing well.  Going everyday without stool softeners.      Wt Readings from Last 3 Encounters:   08/09/21 73 lb 9.6 oz (33.4 kg)   07/29/21 77 lb 12.8 oz (35.3 kg)   07/09/21 90 lb (40.8 kg)     Past Medical History: Cardiovascular:      Rate and Rhythm: Normal rate and regular rhythm. Pulses: Normal pulses. Heart sounds: S1 normal and S2 normal.   Pulmonary:      Effort: Pulmonary effort is normal. No tachypnea, bradypnea, accessory muscle usage or respiratory distress. Breath sounds: Normal breath sounds. No decreased breath sounds, wheezing or rales. Chest:      Chest wall: No tenderness. Abdominal:      General: Bowel sounds are normal. There is no distension. Palpations: Abdomen is soft. There is no mass. Tenderness: There is abdominal tenderness. Musculoskeletal:         General: No tenderness. Normal range of motion. Cervical back: Normal range of motion and neck supple. Skin:     General: Skin is warm and dry. Findings: No abrasion, bruising, burn, ecchymosis, erythema, laceration, lesion or rash. Neurological:      Mental Status: She is alert and oriented to person, place, and time. Motor: No tremor, atrophy or abnormal muscle tone. Coordination: Coordination normal.      Gait: Gait normal.      Deep Tendon Reflexes: Reflexes are normal and symmetric. Psychiatric:         Speech: Speech normal.         Behavior: Behavior normal.         Thought Content: Thought content normal.       Right sided abdominal wall JAYMIE drain site         Patient Active Problem List   Diagnosis    Single delivery by     Abdominal pain    Crohn's disease of large intestine without complication ([address]    Intra-abdominal abscess ([address]    Crohn's disease of small intestine with abscess ([address]    Severe malnutrition ([address]     Assessment:     1. Status post exploratory laparotomy with washout and drain placement x2  2. Non healing JAYMIE drain site  3. Severe malnutrition   4. Crohn's disease    Plan:     1. Abdomen benign. Incision healing well without signs of infection. Continue wound care as directed. 2. Right JAYMIE drain site without signs of infection but non-healing.  There is exposed tissue with some bleeding but tender to touch. Discussed surgical debridement in office with Dr. Kelin Luna. Patient is agreeable. 3. Zofran for nausea until seen by GI  4. Continue diet as tolerated. High protein supplements encouraged. Continue to monitor daily weights. 5. Bowel function doing well. Stool softeners as needed. 6. Denies any abdominal pain  7. Lifting/activity restrictions discussed with patient. Questions answered. Off work for 6 weeks due lifting restrictions. 8. Follow up with GI this week. Okay with resuming Humira from our standpoint. Do not want patient to have another flare up. 9. Follow up in 1 week for debridement of abdominal wall with Dr. Kelin Luna. Signs and symptoms reviewed with patient that would be concerning and need her to return to office for re-evaluation. Patient states she will call if she has questions or concerns.       Electronically signed by PRAVEENA Hayes CNP on 8/9/2021 at 3:15 PM

## 2021-08-10 ASSESSMENT — ENCOUNTER SYMPTOMS: NAUSEA: 1

## 2021-08-18 ENCOUNTER — PROCEDURE VISIT (OUTPATIENT)
Dept: SURGERY | Age: 31
End: 2021-08-18

## 2021-08-18 VITALS
RESPIRATION RATE: 16 BRPM | HEART RATE: 109 BPM | SYSTOLIC BLOOD PRESSURE: 88 MMHG | DIASTOLIC BLOOD PRESSURE: 60 MMHG | TEMPERATURE: 97 F | OXYGEN SATURATION: 94 %

## 2021-08-18 DIAGNOSIS — K50.014 CROHN'S DISEASE OF SMALL INTESTINE WITH ABSCESS (HCC): ICD-10-CM

## 2021-08-18 DIAGNOSIS — S31.109A OPEN WOUND OF ABDOMINAL WALL, INITIAL ENCOUNTER: Primary | ICD-10-CM

## 2021-08-18 PROCEDURE — 99024 POSTOP FOLLOW-UP VISIT: CPT | Performed by: SURGERY

## 2021-08-18 NOTE — PATIENT INSTRUCTIONS
Keep incision as clean and dry as possible. You may shower tomorrow, wash area gently with soap and water and pat dry. No soaking in water until incision completely closed. Watch for signs of infection such as fever, increased redness or tenderness, or foul smelling drainage. Call the office with any questions.

## 2021-08-19 ASSESSMENT — ENCOUNTER SYMPTOMS
STRIDOR: 0
EYE REDNESS: 0
NAUSEA: 0
PHOTOPHOBIA: 0
RECTAL PAIN: 0
EYE PAIN: 0
SHORTNESS OF BREATH: 0
ALLERGIC/IMMUNOLOGIC NEGATIVE: 1
TROUBLE SWALLOWING: 0
DIARRHEA: 0
BLOOD IN STOOL: 0
ABDOMINAL DISTENTION: 1
CHOKING: 0
VOICE CHANGE: 0
SORE THROAT: 0
ANAL BLEEDING: 0
EYE DISCHARGE: 0
BACK PAIN: 0
CHEST TIGHTNESS: 0
ABDOMINAL PAIN: 1
SINUS PRESSURE: 0
CONSTIPATION: 0
WHEEZING: 0
FACIAL SWELLING: 0
COLOR CHANGE: 0
EYE ITCHING: 0
RHINORRHEA: 0
VOMITING: 0
APNEA: 0
COUGH: 0

## 2021-08-19 NOTE — PROGRESS NOTES
Heydi Trujillo (:  1990)     ASSESSMENT:  1. Nonhealing right sided abdominal wall wound  2. Recent history large intra-abdominal abscess status post exploratory laparotomy with washout and drain placement  3. Malnutrition  4. Recent bilateral pleural effusions status post thoracentesis  5. Crohn's disease    PLAN:  1. Excisional debridement with primary closure nonhealing right sided abdominal wall wound. See procedure note below. 2.  Following up with GI service for further treatment of Crohn's disease  3. Abdomen benign. Tolerating diet. Bowel function returned. 4.  Finishing oral antibiotics  5. Restrictions discussed with patient. All questions answered. 6.  Follow-up in 7-10 days in office for suture removal  7. Signs and symptoms reviewed with patient that would be concerning and need her to return to office for re-evaluation. Patient states She will call if She has questions or concerns. SUBJECTIVE/OBJECTIVE:    Chief Complaint   Patient presents with    Procedure     excisional debridement of right abdominal wall non-healing wound     HPI  Juan R Awan is a 26-year-old female who presents for follow-up at the office for nonhealing abdominal wall wound on the right side. Area is a previous JAYMIE drain site. Friable tissue. Mild bleeding occasionally. Patient recently status post exploratory laparotomy with abdominal washout and drain placement for a large abscess secondary to active Crohn's disease. Still on antibiotics. Admits she has been tolerating diet much better. Bowel function returning. Abdomen fairly benign. Mild discomfort. Still having some incisional pain. No fever, chills or sweats. Following up with GI service for Crohn's disease management. No lightheadedness or dizziness. Denies current chest pain or shortness of breath. Overall, feeling a lot better.     Review of Systems   Constitutional: Negative for activity change, appetite change, chills, diaphoresis, CT SCAN    CT DRAIN SOFT TISSUE ABSCESS  2021    CT DRAIN SOFT TISSUE ABSCESS 2021 [facility] CT SCAN    LAPAROTOMY N/A 2021    LAPAROTOMY EXPLORATORY, WASHOUT, AND DRAIN PLACEMENT performed by Moise Hyman MD at [place] [address]  2021    I&D right lower abdominal wound in office-Dr. Yanez    [place]  DELIVERY ONLY N/A 10/12/2018     SECTION performed by Robson Lopez MD at [address] [place] [address] [place]         Current Outpatient Medications   Medication Sig Dispense Refill    ondansetron (ZOFRAN) 4 MG tablet Take 1 tablet by mouth every 6 hours as needed for Nausea or Vomiting (Patient not taking: Reported on 2021) 30 tablet 0     No current facility-administered medications for this visit. Allergies   Allergen Reactions    Duricef [Cefadroxil] Anaphylaxis    Penicillins Rash       Family History   Problem Relation Age of Onset    Other Mother     Crohn's Disease Mother     Heart Disease Father    Coleman Cabot Disease Sister     Rheum Arthritis Sister        Social History     Socioeconomic History    Marital status:      Spouse name: Not on file    Number of children: Not on file    Years of education: Not on file    Highest education level: Not on file   Occupational History    Not on file   Tobacco Use    Smoking status: Never Smoker    Smokeless tobacco: Never Used   Vaping Use    Vaping Use: Never used   Substance and Sexual Activity    Alcohol use: No    Drug use: No    Sexual activity: Yes     Partners: Male   Other Topics Concern    Not on file   Social History Narrative    Not on file     Social Determinants of Health     Financial Resource Strain:     Difficulty of Paying Living Expenses:    Food Insecurity:     Worried About Running Out of Food in the Last Year:     Ran Out of Food in the Last Year:    Transportation Needs:     Lack of Transportation (Medical):      Lack of Transportation (Non-Medical):    Physical Activity:     Days of Exercise per Week:     Minutes of Exercise per Session:    Stress:     Feeling of Stress :    Social Connections:     Frequency of Communication with Friends and Family:     Frequency of Social Gatherings with Friends and Family:     Attends [employer] Services:     Active Member of Clubs or Organizations:     Attends Club or Organization Meetings:     Marital Status:    Intimate Partner Violence:     Fear of Current or Ex-Partner:     Emotionally Abused:     Physically Abused:     Sexually Abused:      Vitals:    08/18/21 1124   BP: 88/60   Site: Left Upper Arm   Position: Sitting   Cuff Size: Medium Adult   Pulse: 109   Resp: 16   Temp: 97 °F (36.1 °C)   TempSrc: Infrared   SpO2: 94%     There is no height or weight on file to calculate BMI. Wt Readings from Last 3 Encounters:   08/09/21 73 lb 9.6 oz (33.4 kg)   07/29/21 77 lb 12.8 oz (35.3 kg)   07/09/21 90 lb (40.8 kg)     Physical Exam  Vitals reviewed. Constitutional:       General: She is not in acute distress. Appearance: She is well-developed. She is not diaphoretic. HENT:      Head: Normocephalic and atraumatic. Right Ear: External ear normal.      Left Ear: External ear normal.      Nose: Nose normal.   Eyes:      General: No scleral icterus. Right eye: No discharge. Left eye: No discharge. Conjunctiva/sclera: Conjunctivae normal.   Cardiovascular:      Rate and Rhythm: Normal rate and regular rhythm. Heart sounds: Normal heart sounds. Pulmonary:      Effort: Pulmonary effort is normal. No respiratory distress. Breath sounds: Normal breath sounds. No wheezing or rales. Chest:      Chest wall: No tenderness. Abdominal:      General: Bowel sounds are normal. There is no distension. Palpations: Abdomen is soft. There is no mass. Tenderness: There is abdominal tenderness (incisional). There is no guarding or rebound. Musculoskeletal:         General: No tenderness. Normal range of motion. Cervical back: Normal range of motion and neck supple. Skin:     General: Skin is warm and dry. Coloration: Skin is not pale. Findings: No erythema or rash. Neurological:      Mental Status: She is alert and oriented to person, place, and time. Cranial Nerves: No cranial nerve deficit. Psychiatric:         Behavior: Behavior normal.         Thought Content: Thought content normal.         Judgment: Judgment normal.       Lab Results   Component Value Date    WBC 13.6 (H) 2021    HGB 11.4 (L) 2021    HCT 39.0 2021    MCV 85.3 2021     2021     Lab Results   Component Value Date     2021    K 3.9 2021     2021    CO2 30 2021     Lab Results   Component Value Date    CREATININE 0.4 2021     Lab Results   Component Value Date    ALT 35 07/10/2021    AST 12 07/10/2021    ALKPHOS 102 07/10/2021    BILITOT 1.2 07/10/2021     Lab Results   Component Value Date    LIPASE 23.2 2021       Patient Active Problem List   Diagnosis    Single delivery by     Abdominal pain    Crohn's disease of large intestine without complication ([address]    Intra-abdominal abscess ([address]    Crohn's disease of small intestine with abscess ([address]    Severe malnutrition ([address]     Procedure Note:    Preoperative Diagnosis: Nonhealing right sided abdominal wall wound  Postoperative Diagnosis: Same  Operation: Excisional debridement skin and subcutaneous tissue right sided nonhealing abdominal wall wound with primary closure (1 cm)  Surgeon:  Dr. Ray Cohen  Anesthesia: Local -lidocaine with sodium bicarb  Complications: none  Indication of Procedure: As above  Procedure: [facility] was taken back to the procedure room. Consent obtained. Timeout occurred. All questions answered. She wished to proceed with debridement and closure.   Placed supine on procedure room table. Area of concern prepped and draped in usual sterile standard fashion. Tolerated local anesthetic well. Elliptical excision around the area of concern with 15 blade scalpel. Nonhealing tissue excisionally debrided included skin and subcutaneous tissue only. Hemostasis obtained with electrocautery and pressure. Area of debridement was 1 cm x 1 cm in diameter. Deep dermal tissues reapproximated with interrupted 3-0 Vicryl suture. Skin reapproximated with interrupted 3-0 Prolene vertical mattress sutures. Dry sterile dressing placed. Less than 5 cc blood loss. Tolerated procedure well. No apparent complications. Left procedure room in stable condition. An electronic signature was used to authenticate this note.     --Allegra Bailon MD

## 2021-08-30 ENCOUNTER — OFFICE VISIT (OUTPATIENT)
Dept: SURGERY | Age: 31
End: 2021-08-30

## 2021-08-30 VITALS
SYSTOLIC BLOOD PRESSURE: 82 MMHG | WEIGHT: 75.3 LBS | RESPIRATION RATE: 16 BRPM | BODY MASS INDEX: 15.18 KG/M2 | HEIGHT: 59 IN | DIASTOLIC BLOOD PRESSURE: 62 MMHG | OXYGEN SATURATION: 99 % | HEART RATE: 104 BPM | TEMPERATURE: 98.4 F

## 2021-08-30 DIAGNOSIS — Z48.02 VISIT FOR SUTURE REMOVAL: ICD-10-CM

## 2021-08-30 DIAGNOSIS — Z98.890 S/P EXPLORATORY LAPAROTOMY: Primary | ICD-10-CM

## 2021-08-30 PROCEDURE — 99024 POSTOP FOLLOW-UP VISIT: CPT | Performed by: NURSE PRACTITIONER

## 2021-08-30 NOTE — PROGRESS NOTES
[address] [facility] [address]  Dept: [phone]  Dept Fax: [phone]  Loc: [phone]    Visit Date: 8/30/2021    Leia Lee is a 36 y.o. female who presents today for:  Chief Complaint   Patient presents with    Follow Up After Procedure     s/p excisional debridement of right abdominal wall non-healing wound 8/18/21     HPI:     HPI    Constantine Haile is a 37-year old female patient who presents for suture removal. Status post excisional debridement of right abdominal wall non-healing JAYMIE drain site 2 weeks ago with Dr. Ryan Robison. Area is healing well without issues. No signs of infection or drainage. Minimal tender. She is status post exploratory laparotomy with washout and drain placement in July secondary to flare up of crohn's disease with intra-abdominal abscesses. Patient lost her insurance and was off her Humira when she had her hospitalizations. She is currently back on her Humira and slowly making progress. Biggest thing is she is still not tolerating diet really well. Appetite decreased. Some nausea and vomiting but improving. Weight has remained stable. Midline incision healed. Appetite is good though. Denies any abdominal pain. No abdominal bloating. Denies any SOB or chest pain. No fevers or chills. No urinary complaints. Bowel function doing well. Saw GI a couple weeks ago and has another appointment in a couple weeks for follow up.      Past Medical History:   Diagnosis Date    Crohn's colitis [facility])       Past Surgical History:   Procedure Laterality Date    CT DRAIN SOFT TISSUE ABSCESS  07/09/2021    CT DRAIN SOFT TISSUE ABSCESS 7/9/2021 [facility] CT SCAN    CT DRAIN SOFT TISSUE ABSCESS  07/09/2021    CT DRAIN SOFT TISSUE ABSCESS 7/9/2021 [facility] CT SCAN    LAPAROTOMY N/A 07/13/2021    LAPAROTOMY EXPLORATORY, WASHOUT, AND DRAIN PLACEMENT performed by Kavitha Marley MD at [address] [address] [facility] HISTORY  2021    I&D right lower abdominal wound in office-Dr. Yanez    [place]  DELIVERY ONLY N/A 10/12/2018     SECTION performed by Luis Alberto Vela MD at [address] [facility] [place]         Family History   Problem Relation Age of Onset    Other Mother     Crohn's Disease Mother     Heart Disease Father    Balta Childes Disease Sister     Rheum Arthritis Sister        Social History     Tobacco Use    Smoking status: Never Smoker    Smokeless tobacco: Never Used   Substance Use Topics    Alcohol use: No      Current Outpatient Medications   Medication Sig Dispense Refill    ondansetron (ZOFRAN) 4 MG tablet Take 1 tablet by mouth every 6 hours as needed for Nausea or Vomiting (Patient not taking: Reported on 2021) 30 tablet 0     No current facility-administered medications for this visit. Allergies   Allergen Reactions    Duricef [Cefadroxil] Anaphylaxis    Penicillins Rash       Subjective:     Review of Systems   Constitutional: Positive for appetite change. Negative for activity change, chills, diaphoresis, fatigue, fever and unexpected weight change. HENT: Negative for congestion, dental problem, drooling, ear discharge, ear pain, facial swelling, hearing loss, mouth sores, nosebleeds, postnasal drip, rhinorrhea, sinus pressure, sneezing, sore throat, tinnitus, trouble swallowing and voice change. Eyes: Negative for photophobia, pain, discharge, redness, itching and visual disturbance. Respiratory: Negative for apnea, cough, choking, chest tightness, shortness of breath, wheezing and stridor. Cardiovascular: Negative for chest pain, palpitations and leg swelling. Gastrointestinal: Positive for nausea. Negative for abdominal distention, abdominal pain, anal bleeding, blood in stool, constipation, diarrhea, rectal pain and vomiting. Endocrine: Negative for cold intolerance, heat intolerance, polydipsia, polyphagia and polyuria.    Genitourinary: Negative for decreased urine volume, difficulty urinating, dysuria, enuresis, flank pain, frequency, genital sores, hematuria and urgency. Musculoskeletal: Negative for arthralgias, back pain, gait problem, joint swelling, myalgias, neck pain and neck stiffness. Skin: Positive for wound. Negative for color change, pallor and rash. Allergic/Immunologic: Negative for environmental allergies, food allergies and immunocompromised state. Neurological: Negative for dizziness, tremors, seizures, syncope, facial asymmetry, speech difficulty, weakness, light-headedness, numbness and headaches. Hematological: Negative for adenopathy. Does not bruise/bleed easily. Psychiatric/Behavioral: Negative for agitation, behavioral problems, confusion, decreased concentration, dysphoric mood, hallucinations, self-injury, sleep disturbance and suicidal ideas. The patient is not nervous/anxious and is not hyperactive. Objective: There were no vitals taken for this visit. Physical Exam  Vitals reviewed. Constitutional:       General: She is not in acute distress. Appearance: Normal appearance. She is underweight. She is not ill-appearing or toxic-appearing. HENT:      Head: Normocephalic and atraumatic. Right Ear: Hearing and external ear normal.      Left Ear: Hearing and external ear normal.      Nose: Nose normal.      Mouth/Throat:      Mouth: Mucous membranes are not pale, not dry and not cyanotic. Eyes:      General: Lids are normal.   Neck:      Trachea: Trachea and phonation normal.   Cardiovascular:      Rate and Rhythm: Normal rate and regular rhythm. Pulses: Normal pulses. Heart sounds: S1 normal and S2 normal.   Pulmonary:      Effort: Pulmonary effort is normal. No tachypnea, bradypnea, accessory muscle usage or respiratory distress. Breath sounds: Normal breath sounds. No decreased breath sounds, wheezing or rales. Chest:      Chest wall: No tenderness.    Abdominal: General: Bowel sounds are normal. There is no distension. Palpations: Abdomen is soft. There is no mass. Tenderness: There is abdominal tenderness. Musculoskeletal:         General: No tenderness. Normal range of motion. Cervical back: Normal range of motion and neck supple. Skin:     General: Skin is warm and dry. Findings: No abrasion, bruising, burn, ecchymosis, erythema, laceration, lesion or rash. Neurological:      Mental Status: She is alert and oriented to person, place, and time. Motor: No tremor, atrophy or abnormal muscle tone. Coordination: Coordination normal.      Gait: Gait normal.      Deep Tendon Reflexes: Reflexes are normal and symmetric. Psychiatric:         Speech: Speech normal.         Behavior: Behavior normal.         Thought Content: Thought content normal.        Patient Active Problem List   Diagnosis    Single delivery by     Abdominal pain    Crohn's disease of large intestine without complication ([address]    Intra-abdominal abscess ([address]    Crohn's disease of small intestine with abscess ([address]    Severe malnutrition ([address]     Assessment:     1. Status post excisional debridement of abdominal wall non-healing Arturo drain site  2. Status post exploratory laparotomy with washout and drain placement x2  3. Severe malnutrition - improving  4. Crohn's disease    Plan:     1. Abdomen benign. Incision healing well. All sutures removed from abdomen without difficulty. 2. Appetite slowly improving. Continue diet as tolerated. High protein supplements encouraged. 3. Weight stable. Continue to check daily. 4. Bowel function doing well. Stool softeners as needed. 5. Tylenol as needed for discomfort  6. Start to increase activity as tolerated. Continue off work for right now secondary to weight. She is only 75# at this time and needs time to re-gain some of her weight back before returning to her strenuous job. 7. Follow up in 2-3 weeks. Signs and symptoms reviewed with patient that would be concerning and need her to return to office for re-evaluation. Patient states she will call if she has questions or concerns.   8. Follow up with GI as directed      Electronically signed by PRAVEENA Rick CNP on 8/30/2021 at 8:55 AM

## 2021-09-01 ASSESSMENT — ENCOUNTER SYMPTOMS
RECTAL PAIN: 0
BLOOD IN STOOL: 0
SINUS PRESSURE: 0
CHEST TIGHTNESS: 0
DIARRHEA: 0
ANAL BLEEDING: 0
CHOKING: 0
APNEA: 0
WHEEZING: 0
RHINORRHEA: 0
COUGH: 0
PHOTOPHOBIA: 0
ABDOMINAL PAIN: 0
CONSTIPATION: 0
ABDOMINAL DISTENTION: 0
SORE THROAT: 0
TROUBLE SWALLOWING: 0
COLOR CHANGE: 0
EYE DISCHARGE: 0
VOMITING: 0
VOICE CHANGE: 0
EYE ITCHING: 0
SHORTNESS OF BREATH: 0
BACK PAIN: 0
FACIAL SWELLING: 0
EYE PAIN: 0
NAUSEA: 1
EYE REDNESS: 0
STRIDOR: 0

## 2021-09-21 NOTE — PROGRESS NOTES
2021 [facility] CT SCAN    LAPAROTOMY N/A 2021    LAPAROTOMY EXPLORATORY, WASHOUT, AND DRAIN PLACEMENT performed by Kathia Amanda MD at [address] [facility]  2021    I&D right lower abdominal wound in office-Dr. Yanez    [place]  DELIVERY ONLY N/A 10/12/2018     SECTION performed by Latonya Olivarez MD at [address] [facility] [address] [place]         Family History   Problem Relation Age of Onset    Other Mother     Crohn's Disease Mother     Heart Disease Father    Charmayne Osgood Disease Sister     Rheum Arthritis Sister        Social History     Tobacco Use    Smoking status: Never Smoker    Smokeless tobacco: Never Used   Substance Use Topics    Alcohol use: No      Current Outpatient Medications   Medication Sig Dispense Refill    ondansetron (ZOFRAN) 4 MG tablet Take 1 tablet by mouth every 6 hours as needed for Nausea or Vomiting (Patient not taking: Reported on 2021) 30 tablet 0     No current facility-administered medications for this visit. Allergies   Allergen Reactions    Duricef [Cefadroxil] Anaphylaxis    Penicillins Rash       Subjective:     Review of Systems   Constitutional: Negative for activity change, appetite change, chills, diaphoresis, fatigue, fever and unexpected weight change. HENT: Negative for congestion, dental problem, drooling, ear discharge, ear pain, facial swelling, hearing loss, mouth sores, nosebleeds, postnasal drip, rhinorrhea, sinus pressure, sneezing, sore throat, tinnitus, trouble swallowing and voice change. Eyes: Negative for photophobia, pain, discharge, redness, itching and visual disturbance. Respiratory: Negative for apnea, cough, choking, chest tightness, shortness of breath, wheezing and stridor. Cardiovascular: Negative for chest pain, palpitations and leg swelling. Gastrointestinal: Positive for nausea and vomiting.  Negative for abdominal distention, abdominal pain, anal bleeding, blood in stool, constipation, diarrhea and rectal pain. Endocrine: Negative. Negative for cold intolerance, heat intolerance, polydipsia, polyphagia and polyuria. Genitourinary: Negative for decreased urine volume, difficulty urinating, dysuria, enuresis, flank pain, frequency, genital sores, hematuria and urgency. Musculoskeletal: Negative for arthralgias, back pain, gait problem, joint swelling, myalgias, neck pain and neck stiffness. Skin: Negative for color change, pallor, rash and wound. Allergic/Immunologic: Negative. Negative for environmental allergies, food allergies and immunocompromised state. Neurological: Negative for dizziness, tremors, seizures, syncope, facial asymmetry, speech difficulty, weakness, light-headedness, numbness and headaches. Hematological: Negative. Negative for adenopathy. Does not bruise/bleed easily. Psychiatric/Behavioral: Negative. Negative for agitation, behavioral problems, confusion, decreased concentration, dysphoric mood, hallucinations, self-injury, sleep disturbance and suicidal ideas. The patient is not nervous/anxious and is not hyperactive. Objective: There were no vitals taken for this visit. Physical Exam  Vitals reviewed. Constitutional:       General: She is not in acute distress. Appearance: Normal appearance. She is underweight. She is not ill-appearing or toxic-appearing. HENT:      Head: Normocephalic and atraumatic. Right Ear: Hearing and external ear normal.      Left Ear: Hearing and external ear normal.      Nose: Nose normal.      Mouth/Throat:      Mouth: Mucous membranes are not pale, not dry and not cyanotic. Eyes:      General: Lids are normal.   Neck:      Trachea: Trachea and phonation normal.   Cardiovascular:      Rate and Rhythm: Normal rate and regular rhythm. Pulses: Normal pulses.       Heart sounds: S1 normal and S2 normal.   Pulmonary:      Effort: Pulmonary effort is normal. No tachypnea, bradypnea, accessory muscle usage or respiratory distress. Breath sounds: Normal breath sounds. No decreased breath sounds, wheezing or rales. Chest:      Chest wall: No tenderness. Abdominal:      General: Bowel sounds are normal. There is no distension. Palpations: Abdomen is soft. There is no mass. Tenderness: There is no abdominal tenderness. Musculoskeletal:         General: No tenderness. Normal range of motion. Cervical back: Normal range of motion and neck supple. Skin:     General: Skin is warm and dry. Findings: No abrasion, bruising, burn, ecchymosis, erythema, laceration, lesion or rash. Neurological:      Mental Status: She is alert and oriented to person, place, and time. Motor: No tremor, atrophy or abnormal muscle tone. Coordination: Coordination normal.      Gait: Gait normal.      Deep Tendon Reflexes: Reflexes are normal and symmetric. Psychiatric:         Speech: Speech normal.         Behavior: Behavior normal.         Thought Content: Thought content normal.       Wt Readings from Last 3 Encounters:   21 81 lb (36.7 kg)   21 75 lb 4.8 oz (34.2 kg)   21 73 lb 9.6 oz (33.4 kg)     Patient Active Problem List   Diagnosis    Single delivery by     Abdominal pain    Crohn's disease of large intestine without complication ([address]    Intra-abdominal abscess ([address]    Crohn's disease of small intestine with abscess ([address]    Severe malnutrition ([address]     Assessment:     1. Status post excisional debridement of abdominal wall non-healing Arturo drain site  2. Status post exploratory laparotomy with washout and drain placement x2  3. Severe malnutrition - improving  4. BMI 16  5. Crohn's disease    Plan:     1. Abdomen benign. Incisions and drain site healing well without issues. 2. Appetite slowly improving. Continue diet as tolerated. High protein supplements encouraged. 3. Continue to check daily. Up 6 lbs since last visit.    4. Bowel function doing well. Stool softeners as needed. 5. Tylenol as needed for discomfort  6. Okay to return to work on 10/4/21 with restrictions noted for 2 weeks. Increase weight restrictions thereafter as tolerated. 7. Follow up in office as needed. We will call patient every 2 weeks to discuss activity restrictions. Signs and symptoms reviewed with patient that would be concerning and need her to return to office for re-evaluation. Patient states she will call if she has questions or concerns.   8. Follow up with GI as directed    Electronically signed by PRAVEENA Mera CNP on 9/21/2021 at 7:55 AM

## 2021-09-22 ENCOUNTER — OFFICE VISIT (OUTPATIENT)
Dept: SURGERY | Age: 31
End: 2021-09-22

## 2021-09-22 VITALS
BODY MASS INDEX: 16.33 KG/M2 | HEIGHT: 59 IN | WEIGHT: 81 LBS | HEART RATE: 100 BPM | RESPIRATION RATE: 12 BRPM | OXYGEN SATURATION: 99 % | TEMPERATURE: 97 F | SYSTOLIC BLOOD PRESSURE: 105 MMHG | DIASTOLIC BLOOD PRESSURE: 67 MMHG

## 2021-09-22 DIAGNOSIS — Z98.890 S/P EXPLORATORY LAPAROTOMY: Primary | ICD-10-CM

## 2021-09-22 PROCEDURE — 99024 POSTOP FOLLOW-UP VISIT: CPT | Performed by: NURSE PRACTITIONER

## 2021-09-22 ASSESSMENT — ENCOUNTER SYMPTOMS
CHEST TIGHTNESS: 0
ANAL BLEEDING: 0
TROUBLE SWALLOWING: 0
APNEA: 0
RHINORRHEA: 0
WHEEZING: 0
ABDOMINAL PAIN: 0
ALLERGIC/IMMUNOLOGIC NEGATIVE: 1
RECTAL PAIN: 0
ABDOMINAL DISTENTION: 0
COUGH: 0
EYE PAIN: 0
EYE ITCHING: 0
BLOOD IN STOOL: 0
FACIAL SWELLING: 0
NAUSEA: 1
COLOR CHANGE: 0
STRIDOR: 0
VOMITING: 1
CONSTIPATION: 0
PHOTOPHOBIA: 0
SHORTNESS OF BREATH: 0
SINUS PRESSURE: 0
VOICE CHANGE: 0
SORE THROAT: 0
CHOKING: 0
EYE REDNESS: 0
EYE DISCHARGE: 0
BACK PAIN: 0
DIARRHEA: 0

## 2021-09-22 NOTE — LETTER
[address] [facility]  [place] [address] [facility] [address]  Phone: [phone]  Fax: [phone]    PRAVEENA Toledo CNP        September 22, 2021     Patient: Saúl Linder   YOB: 1990   Date of Visit: 9/22/2021       To Whom It May Concern: It is my medical opinion that Kailash Jha may return to work on 10/4/21 with restrictions if no lifting, pulling or tugging greater than 25 lbs for at least two weeks. We will re-evaluate every 2 weeks when she can increase weight limitations and resume normal duties. If you have any questions or concerns, please don't hesitate to call.     Sincerely,        PRAVEENA Toledo CNP

## 2021-10-06 ENCOUNTER — TELEPHONE (OUTPATIENT)
Dept: SURGERY | Age: 31
End: 2021-10-06

## 2021-10-06 DIAGNOSIS — L24.A9 WOUND DRAINAGE: Primary | ICD-10-CM

## 2021-10-06 RX ORDER — SULFAMETHOXAZOLE AND TRIMETHOPRIM 800; 160 MG/1; MG/1
1 TABLET ORAL 2 TIMES DAILY
Qty: 10 TABLET | Refills: 0 | Status: SHIPPED | OUTPATIENT
Start: 2021-10-06 | End: 2021-10-11 | Stop reason: ALTCHOICE

## 2021-10-06 NOTE — TELEPHONE ENCOUNTER
S/ps/p excisional debridement of right abdominal wall non-healing wound 8/18/21  Patient calling in stating area where drain tube was and previously debrided is red and swollen. Painful to the touch. She states it looks like a pimple ready to pop. Do you want to see in the office and start on ATB?

## 2021-10-06 NOTE — TELEPHONE ENCOUNTER
Spoke with Dr Ary Chaney in [place] no improvement-see Jayro on Monday in office. Patient aware and meds sent to pharmacy.

## 2021-10-07 ENCOUNTER — OFFICE VISIT (OUTPATIENT)
Dept: SURGERY | Age: 31
End: 2021-10-07

## 2021-10-07 VITALS
BODY MASS INDEX: 16.18 KG/M2 | WEIGHT: 80.1 LBS | TEMPERATURE: 97.1 F | RESPIRATION RATE: 16 BRPM | OXYGEN SATURATION: 100 % | SYSTOLIC BLOOD PRESSURE: 84 MMHG | DIASTOLIC BLOOD PRESSURE: 60 MMHG | HEART RATE: 115 BPM

## 2021-10-07 DIAGNOSIS — L24.A9 WOUND DRAINAGE: Primary | ICD-10-CM

## 2021-10-07 PROCEDURE — 99024 POSTOP FOLLOW-UP VISIT: CPT | Performed by: SURGERY

## 2021-10-07 ASSESSMENT — ENCOUNTER SYMPTOMS
ANAL BLEEDING: 0
SORE THROAT: 0
PHOTOPHOBIA: 0
RECTAL PAIN: 0
ABDOMINAL DISTENTION: 0
VOICE CHANGE: 0
EYE PAIN: 0
CHOKING: 0
COUGH: 0
RHINORRHEA: 0
NAUSEA: 0
CONSTIPATION: 0
FACIAL SWELLING: 0
STRIDOR: 0
EYE DISCHARGE: 0
ABDOMINAL PAIN: 1
SHORTNESS OF BREATH: 0
EYE REDNESS: 0
ALLERGIC/IMMUNOLOGIC NEGATIVE: 1
CHEST TIGHTNESS: 0
BLOOD IN STOOL: 0
EYE ITCHING: 0
SINUS PRESSURE: 0
APNEA: 0
COLOR CHANGE: 0
TROUBLE SWALLOWING: 0
BACK PAIN: 0
WHEEZING: 0
VOMITING: 0
DIARRHEA: 0

## 2021-10-07 NOTE — PROGRESS NOTES
Juan Harmon (:  1990)     ASSESSMENT:  1. Right abdominal wall abscess with drainage  2. History excisional debridement right abdominal wall nonhealing JAYMIE drain site (2021)  3. History exploratory laparotomy with washout and drain placement (2021)    PLAN:  1. Wound care - wound opened spontaneously last night. No need for further I&D today in office. 2.  Cultures obtained in office today. Await final results. Alter antibiotics as needed. 3.  Just started Bactrim yesterday. Continue as directed. 4.  Restrictions discussed. 5.  Follow-up in office next week  6. If no improvement or worsens then CT imaging to see if there is a deeper abscess/fluid collection pocket but clinically seems to be all within the abdominal wall. 7.  Signs and symptoms reviewed with patient that would be concerning and need her to return to office for re-evaluation. Patient states She will call if She has questions or concerns. 8.  Follow-up with GI as directed    SUBJECTIVE/OBJECTIVE:    Chief Complaint   Patient presents with    Post-Op Check     s/p Exploratory laparotomy with washout and drain placement x2-2021    Post-Op Check      s/p excisional debridement of right abdominal wall non-healing wound 21 last seen in the office on 2021-area opened started on Bactrim 10/6/2021     ANIA Rosario is a 61-year-old female who presents for evaluation of a right sided abdominal wall abscess. She has a history of exploratory laparotomy with washout and drain placement from a large intra-abdominal abscess back in 2021. The right side JAYMIE drain site never healed and required debridement with closure 2021. This seemed to heal up but then started to become more irritating recently. Having some night sweats and chills. The area became more firm and started to become more obvious of an infection. She called yesterday and Bactrim was given.   Last night it drained spontaneously. Large amount of purulent foul-smelling liquid. She states she does feel better now. Still having some drainage. Having normal bowel function. No hematochezia or melena. No other skin or subcutaneous concerns for infection. Tolerating diet. Following with GI service secondary to her Crohn's. Review of Systems   Constitutional: Negative for activity change, appetite change, chills, diaphoresis, fatigue, fever and unexpected weight change. HENT: Negative for congestion, dental problem, drooling, ear discharge, ear pain, facial swelling, hearing loss, mouth sores, nosebleeds, postnasal drip, rhinorrhea, sinus pressure, sneezing, sore throat, tinnitus, trouble swallowing and voice change. Eyes: Negative for photophobia, pain, discharge, redness, itching and visual disturbance. Respiratory: Negative for apnea, cough, choking, chest tightness, shortness of breath, wheezing and stridor. Cardiovascular: Negative for chest pain, palpitations and leg swelling. Gastrointestinal: Positive for abdominal pain. Negative for abdominal distention, anal bleeding, blood in stool, constipation, diarrhea, nausea, rectal pain and vomiting. Endocrine: Negative. Genitourinary: Negative for decreased urine volume, difficulty urinating, dyspareunia, dysuria, enuresis, flank pain, frequency, genital sores, hematuria, menstrual problem, pelvic pain, urgency, vaginal bleeding, vaginal discharge and vaginal pain. Musculoskeletal: Negative for arthralgias, back pain, gait problem, joint swelling, myalgias, neck pain and neck stiffness. Skin: Positive for wound. Negative for color change, pallor and rash. Allergic/Immunologic: Negative. Neurological: Negative for dizziness, tremors, seizures, syncope, facial asymmetry, speech difficulty, weakness, light-headedness, numbness and headaches. Hematological: Negative for adenopathy. Does not bruise/bleed easily.    Psychiatric/Behavioral: Negative for agitation, behavioral problems, confusion, decreased concentration, dysphoric mood, hallucinations, self-injury, sleep disturbance and suicidal ideas. The patient is not nervous/anxious and is not hyperactive. Past Medical History:   Diagnosis Date    Crohn's colitis [facility])        Past Surgical History:   Procedure Laterality Date    CT DRAIN SOFT TISSUE ABSCESS  2021    CT DRAIN SOFT TISSUE ABSCESS 2021 [facility] CT SCAN    CT DRAIN SOFT TISSUE ABSCESS  2021    CT DRAIN SOFT TISSUE ABSCESS 2021 [facility] CT SCAN    LAPAROTOMY N/A 2021    LAPAROTOMY EXPLORATORY, WASHOUT, AND DRAIN PLACEMENT performed by Ilda Rodriguez MD at [address],[facility]  2021    I&D right lower abdominal wound in office-Dr. Yanez    SD  DELIVERY ONLY N/A 10/12/2018     SECTION performed by Tawny Egan MD at [address] [facility] [place]         Current Outpatient Medications   Medication Sig Dispense Refill    [address] [address] 0.25-35 MG-MCG per tablet Take 1 tablet by mouth daily      sulfamethoxazole-trimethoprim (BACTRIM DS) 800-160 MG per tablet Take 1 tablet by mouth 2 times daily for 5 days 10 tablet 0    adalimumab (HUMIRA) 40 MG/0.8ML injection Inject into the skin      ondansetron (ZOFRAN) 4 MG tablet Take 1 tablet by mouth every 6 hours as needed for Nausea or Vomiting (Patient not taking: Reported on 2021) 30 tablet 0     No current facility-administered medications for this visit.        Allergies   Allergen Reactions    Duricef [Cefadroxil] Anaphylaxis    Penicillins Rash       Family History   Problem Relation Age of Onset    Other Mother     Crohn's Disease Mother     Heart Disease Father    Keli Ng Disease Sister     Rheum Arthritis Sister        Social History     Socioeconomic History    Marital status:      Spouse name: Not on file    Number of children: Not on file    Years of education: Not on file    Highest education level: Not on file   Occupational History    Not on file   Tobacco Use    Smoking status: Never Smoker    Smokeless tobacco: Never Used   Vaping Use    Vaping Use: Never used   Substance and Sexual Activity    Alcohol use: No    Drug use: No    Sexual activity: Yes     Partners: Male   Other Topics Concern    Not on file   Social History Narrative    Not on file     Social Determinants of Health     Financial Resource Strain:     Difficulty of Paying Living Expenses:    Food Insecurity:     Worried About Running Out of Food in the Last Year:     [address] in the Last Year:    Transportation Needs:     Lack of Transportation (Medical):  Lack of Transportation (Non-Medical):    Physical Activity:     Days of Exercise per Week:     Minutes of Exercise per Session:    Stress:     Feeling of Stress :    Social Connections:     Frequency of Communication with Friends and Family:     Frequency of Social Gatherings with Friends and Family:     Attends Congregational Services:     Active Member of Clubs or Organizations:     Attends Club or Organization Meetings:     Marital Status:    Intimate Partner Violence:     Fear of Current or Ex-Partner:     Emotionally Abused:     Physically Abused:     Sexually Abused:      Vitals:    10/07/21 0920   BP: 84/60   Site: Left Upper Arm   Position: Sitting   Cuff Size: Medium Adult   Pulse: 115   Resp: 16   Temp: 97.1 °F (36.2 °C)   TempSrc: Temporal   SpO2: 100%   Weight: 80 lb 1.6 oz (36.3 kg)     Body mass index is 16.18 kg/m². Wt Readings from Last 3 Encounters:   10/07/21 80 lb 1.6 oz (36.3 kg)   09/22/21 81 lb (36.7 kg)   08/30/21 75 lb 4.8 oz (34.2 kg)     Physical Exam  Vitals reviewed. Constitutional:       General: She is not in acute distress. Appearance: She is well-developed. She is not diaphoretic. HENT:      Head: Normocephalic and atraumatic.       Right Ear: External ear normal.      Left Ear: External ear normal. Nose: Nose normal.   Eyes:      General: No scleral icterus. Right eye: No discharge. Left eye: No discharge. Conjunctiva/sclera: Conjunctivae normal.   Cardiovascular:      Rate and Rhythm: Normal rate and regular rhythm. Heart sounds: Normal heart sounds. Pulmonary:      Effort: Pulmonary effort is normal. No respiratory distress. Breath sounds: Normal breath sounds. No wheezing or rales. Chest:      Chest wall: No tenderness. Abdominal:      General: Bowel sounds are normal. There is no distension. Palpations: Abdomen is soft. There is no mass. Tenderness: There is abdominal tenderness in the right upper quadrant and right lower quadrant. There is no guarding or rebound. Hernia: There is no hernia in the ventral area. Musculoskeletal:         General: No tenderness. Normal range of motion. Cervical back: Normal range of motion and neck supple. Skin:     General: Skin is warm and dry. Coloration: Skin is not pale. Findings: Erythema and wound present. No rash. Neurological:      Mental Status: She is alert and oriented to person, place, and time. Cranial Nerves: No cranial nerve deficit. Psychiatric:         Behavior: Behavior normal.         Thought Content:  Thought content normal.         Judgment: Judgment normal.       Lab Results   Component Value Date    WBC 12.2 (H) 09/22/2021    HGB 11.5 (L) 09/22/2021    HCT 35.4 09/22/2021    MCV 80 09/22/2021     (H) 09/22/2021     Lab Results   Component Value Date     07/19/2021    K 3.9 07/19/2021     07/19/2021    CO2 30 07/19/2021     Lab Results   Component Value Date    CREATININE 0.52 09/22/2021     Lab Results   Component Value Date    ALT 16 09/22/2021    AST 13 09/22/2021    ALKPHOS 175 (H) 09/22/2021    BILITOT 0.3 09/22/2021     Lab Results   Component Value Date    LIPASE 23.2 06/29/2021       Patient Active Problem List   Diagnosis    Single delivery by     Abdominal pain    Crohn's disease of large intestine without complication ([address]    Intra-abdominal abscess ([address]    Crohn's disease of small intestine with abscess ([address]    Severe malnutrition ([address]       An electronic signature was used to authenticate this note.     --Aurora Jones MD

## 2021-10-11 ENCOUNTER — OFFICE VISIT (OUTPATIENT)
Dept: SURGERY | Age: 31
End: 2021-10-11

## 2021-10-11 VITALS
BODY MASS INDEX: 16.55 KG/M2 | HEART RATE: 85 BPM | DIASTOLIC BLOOD PRESSURE: 60 MMHG | SYSTOLIC BLOOD PRESSURE: 120 MMHG | OXYGEN SATURATION: 95 % | TEMPERATURE: 97.5 F | RESPIRATION RATE: 18 BRPM | HEIGHT: 59 IN | WEIGHT: 82.1 LBS

## 2021-10-11 DIAGNOSIS — L24.A9 WOUND DRAINAGE: Primary | ICD-10-CM

## 2021-10-11 PROCEDURE — 99024 POSTOP FOLLOW-UP VISIT: CPT | Performed by: NURSE PRACTITIONER

## 2021-10-11 NOTE — PROGRESS NOTES
[address] [address]  Dept: [phone]  Dept Fax: [phone]  Loc: [phone]    Visit Date: 10/11/2021    Atul Kelly is a 36 y.o. female who presents today for:  Chief Complaint   Patient presents with    Wound Check     check right abdominal wall nonhealing JAYMIE drain site        HPI:     HPI    Nino Mcburney is a 72-year-old female who presents for follow up of a right sided abdominal wall abscess. Saw last week with culture taken. She completed her 7 days of bactrim. She has a history of exploratory laparotomy with washout and drain placement from a large intra-abdominal abscess back in 2021. The right side JAYMIE drain site never healed and required debridement with closure 2021. This seemed to heal up but then started to become more irritating recently. States fevers and chills have resolved. Area is still having some drainage but non-purulent and not as tender. No longer red and indurated. Having normal bowel function. No hematochezia or melena. No other skin or subcutaneous concerns for infection. Tolerating diet. Following with GI service secondary to her Crohn's.     Past Medical History:   Diagnosis Date    Crohn's colitis [facility])       Past Surgical History:   Procedure Laterality Date    CT DRAIN SOFT TISSUE ABSCESS  2021    CT DRAIN SOFT TISSUE ABSCESS 2021 [facility] CT SCAN    CT DRAIN SOFT TISSUE ABSCESS  2021    CT DRAIN SOFT TISSUE ABSCESS 2021 [facility] CT SCAN    LAPAROTOMY N/A 2021    LAPAROTOMY EXPLORATORY, WASHOUT, AND DRAIN PLACEMENT performed by Jaziel Boss MD at [address] [facility]  2021    I&D right lower abdominal wound in office-Dr. Yanez    MD  DELIVERY ONLY N/A 10/12/2018     SECTION performed by Jacques Jackson MD at [address] [place] [address] [place]         Family History   Problem Relation Age of Onset    Other Mother     Crohn's Disease Mother     Heart Disease Father    Gaby Shingles Disease Sister     Rheum Arthritis Sister        Social History     Tobacco Use    Smoking status: Never Smoker    Smokeless tobacco: Never Used   Substance Use Topics    Alcohol use: No      Current Outpatient Medications   Medication Sig Dispense Refill    SPRINTEC 28 0.25-35 MG-MCG per tablet Take 1 tablet by mouth daily      sulfamethoxazole-trimethoprim (BACTRIM DS) 800-160 MG per tablet Take 1 tablet by mouth 2 times daily for 5 days 10 tablet 0    adalimumab (HUMIRA) 40 MG/0.8ML injection Inject into the skin      ondansetron (ZOFRAN) 4 MG tablet Take 1 tablet by mouth every 6 hours as needed for Nausea or Vomiting (Patient not taking: Reported on 8/18/2021) 30 tablet 0     No current facility-administered medications for this visit. Allergies   Allergen Reactions    Duricef [Cefadroxil] Anaphylaxis    Penicillins Rash       Subjective:     Review of Systems   Constitutional: Negative for activity change, appetite change, chills, diaphoresis, fatigue, fever and unexpected weight change. HENT: Negative for congestion, dental problem, drooling, ear discharge, ear pain, facial swelling, hearing loss, mouth sores, nosebleeds, postnasal drip, rhinorrhea, sinus pressure, sneezing, sore throat, tinnitus, trouble swallowing and voice change. Eyes: Negative for photophobia, pain, discharge, redness, itching and visual disturbance. Respiratory: Negative for apnea, cough, choking, chest tightness, shortness of breath, wheezing and stridor. Cardiovascular: Negative for chest pain, palpitations and leg swelling. Gastrointestinal: Negative for abdominal distention, abdominal pain, anal bleeding, blood in stool, constipation, diarrhea, nausea, rectal pain and vomiting. Endocrine: Negative.     Genitourinary: Negative for decreased urine volume, difficulty urinating, dyspareunia, dysuria, enuresis, flank pain, frequency, genital sores, hematuria, menstrual problem, pelvic pain, urgency, vaginal bleeding, vaginal discharge and vaginal pain. Musculoskeletal: Negative for arthralgias, back pain, gait problem, joint swelling, myalgias, neck pain and neck stiffness. Skin: Positive for wound. Negative for color change, pallor and rash. Allergic/Immunologic: Negative. Neurological: Negative for dizziness, tremors, seizures, syncope, facial asymmetry, speech difficulty, weakness, light-headedness, numbness and headaches. Hematological: Negative for adenopathy. Does not bruise/bleed easily. Psychiatric/Behavioral: Negative for agitation, behavioral problems, confusion, decreased concentration, dysphoric mood, hallucinations, self-injury, sleep disturbance and suicidal ideas. The patient is not nervous/anxious and is not hyperactive. Objective: There were no vitals taken for this visit. Physical Exam  Vitals reviewed. Constitutional:       General: She is not in acute distress. Appearance: She is well-developed. She is not diaphoretic. HENT:      Head: Normocephalic and atraumatic. Right Ear: External ear normal.      Left Ear: External ear normal.      Nose: Nose normal.   Eyes:      General: No scleral icterus. Right eye: No discharge. Left eye: No discharge. Conjunctiva/sclera: Conjunctivae normal.   Cardiovascular:      Rate and Rhythm: Normal rate and regular rhythm. Heart sounds: Normal heart sounds. Pulmonary:      Effort: Pulmonary effort is normal. No respiratory distress. Breath sounds: Normal breath sounds. No wheezing or rales. Chest:      Chest wall: No tenderness. Abdominal:      General: Bowel sounds are normal. There is no distension. Palpations: Abdomen is soft. There is no mass. Tenderness: There is no abdominal tenderness. There is no guarding or rebound. Hernia: There is no hernia in the ventral area. Hospital Lab  Source: abdomen       Site: swab          Current Antibiotics: none   Susceptibility    Brevundimonas diminuta/vesicularis (2)    Antibiotic Interpretation SARAH Status    cefTRIAXone Sensitive 8 mcg/mL Final    cefepime Sensitive 8 mcg/mL Final    gentamicin Sensitive 4 mcg/mL Final    piperacillin-tazobactam Sensitive <=4 mcg/mL Final    tobramycin Intermediate 8 mcg/mL Final    trimethoprim-sulfamethoxazole Resistant >=320 mcg/mL Final    ciprofloxacin Resistant >=4 mcg/mL Final    levofloxacin Resistant >=8 mcg/mL Final      Patient Active Problem List   Diagnosis    Single delivery by     Abdominal pain    Crohn's disease of large intestine without complication ([address]    Intra-abdominal abscess ([address]    Crohn's disease of small intestine with abscess ([address]    Severe malnutrition ([address]     Assessment:     1. Right abdominal wall abscess   2. History excisional debridement right abdominal wall nonhealing JAYMIE drain site (2021)  3. History exploratory laparotomy with washout and drain placement (2021)  4. Underweight secondary to crohn's disease    Plan:     1. Culture reviewed. Bactrim not sensitive but clinically wound has significantly improved. Continue good wound care at this time and will hold on further antibiotics. If wound fails to improve or worsens then will treat. 2. Appetite doing well. Continue diet as tolerated. High protein supplements encouraged. 3. Bowel function doing well  4. Patient back to work on light duty still without issues  5. Follow up in 10 days for wound check. Signs and symptoms reviewed with patient that would be concerning and need her to return to office for re-evaluation. Patient states she will call if she has questions or concerns.       Electronically signed by PRAVEENA Hanson CNP on 10/11/2021 at 7:32 AM

## 2021-10-12 LAB
AEROBIC CULTURE: ABNORMAL
ANAEROBIC CULTURE: ABNORMAL
GRAM STAIN RESULT: ABNORMAL
ORGANISM: ABNORMAL
ORGANISM: ABNORMAL

## 2021-10-15 ASSESSMENT — ENCOUNTER SYMPTOMS
DIARRHEA: 0
BACK PAIN: 0
BLOOD IN STOOL: 0
ANAL BLEEDING: 0
EYE DISCHARGE: 0
CHEST TIGHTNESS: 0
RECTAL PAIN: 0
RHINORRHEA: 0
SHORTNESS OF BREATH: 0
COUGH: 0
FACIAL SWELLING: 0
VOMITING: 0
ALLERGIC/IMMUNOLOGIC NEGATIVE: 1
EYE REDNESS: 0
APNEA: 0
COLOR CHANGE: 0
TROUBLE SWALLOWING: 0
SORE THROAT: 0
SINUS PRESSURE: 0
EYE PAIN: 0
PHOTOPHOBIA: 0
WHEEZING: 0
VOICE CHANGE: 0
ABDOMINAL PAIN: 0
ABDOMINAL DISTENTION: 0
CONSTIPATION: 0
EYE ITCHING: 0
STRIDOR: 0
CHOKING: 0
NAUSEA: 0

## 2021-10-18 ENCOUNTER — OFFICE VISIT (OUTPATIENT)
Dept: SURGERY | Age: 31
End: 2021-10-18
Payer: COMMERCIAL

## 2021-10-18 VITALS
DIASTOLIC BLOOD PRESSURE: 62 MMHG | RESPIRATION RATE: 14 BRPM | HEART RATE: 94 BPM | BODY MASS INDEX: 17.12 KG/M2 | TEMPERATURE: 97.2 F | OXYGEN SATURATION: 100 % | HEIGHT: 59 IN | SYSTOLIC BLOOD PRESSURE: 115 MMHG | WEIGHT: 84.9 LBS

## 2021-10-18 DIAGNOSIS — S31.109A OPEN WOUND OF ABDOMINAL WALL, INITIAL ENCOUNTER: Primary | ICD-10-CM

## 2021-10-18 PROCEDURE — 99212 OFFICE O/P EST SF 10 MIN: CPT | Performed by: NURSE PRACTITIONER

## 2021-10-18 ASSESSMENT — ENCOUNTER SYMPTOMS
CHOKING: 0
BACK PAIN: 0
CONSTIPATION: 0
ABDOMINAL DISTENTION: 0
RHINORRHEA: 0
SORE THROAT: 0
NAUSEA: 0
EYE PAIN: 0
WHEEZING: 0
EYE REDNESS: 0
APNEA: 0
VOMITING: 0
EYE ITCHING: 0
COUGH: 0
STRIDOR: 0
EYE DISCHARGE: 0
ANAL BLEEDING: 0
CHEST TIGHTNESS: 0
RECTAL PAIN: 0
ALLERGIC/IMMUNOLOGIC NEGATIVE: 1
COLOR CHANGE: 0
PHOTOPHOBIA: 0
DIARRHEA: 0
VOICE CHANGE: 0
FACIAL SWELLING: 0
SINUS PRESSURE: 0
ABDOMINAL PAIN: 0
SHORTNESS OF BREATH: 0
BLOOD IN STOOL: 0
TROUBLE SWALLOWING: 0

## 2021-10-18 NOTE — LETTER
[address] [facility]  [address] [facility] [address]  Phone: [phone]  Fax: [phone]    PRAVEENA Fountain CNP        October 18, 2021     Patient: Nava Reilly   YOB: 1990   Date of Visit: 10/18/2021       To Whom It May Concern: It is my medical opinion that Awais Ortiz remain on lifting restrictions until 11/8/2021. After 11/8/2021 she may return without restrictions. If you have any questions or concerns, please don't hesitate to call.     Sincerely,        PRAVEENA Fountain CNP

## 2021-10-19 NOTE — PROGRESS NOTES
[address] [place] [address]  Dept: [phone]  Dept Fax: [phone]  Loc: [phone]    Visit Date: 10/18/2021    Dann Hightower is a 36 y.o. female who presents today for:  Chief Complaint   Patient presents with    Wound Check     1 week f/u--check justin drain site       HPI:     ANIA Moraes is a 82-year-old female who presents for wound check secondary to right sided abdominal wall abscess. History of exploratory laparotomy with washout and drain placement from a large intra-abdominal abscess back in 2021.  The right side JUSTIN drain site never healed and required debridement with closure 2021. She was treated with antibiotics and has now been off for over a week. Area is now healed with small scabbed. No longer draining. Non-tender. No fevers or chills. No signs of infection. Having normal bowel function.  No hematochezia or melena.  Tolerating diet. No nausea or vomiting. Following with GI service secondary to her Crohn's. Still back to work on lifting restrictions.     Past Medical History:   Diagnosis Date    Crohn's colitis [facility])       Past Surgical History:   Procedure Laterality Date    CT DRAIN SOFT TISSUE ABSCESS  2021    CT DRAIN SOFT TISSUE ABSCESS 2021 [facility] CT SCAN    CT DRAIN SOFT TISSUE ABSCESS  2021    CT DRAIN SOFT TISSUE ABSCESS 2021 [facility] CT SCAN    LAPAROTOMY N/A 2021    LAPAROTOMY EXPLORATORY, WASHOUT, AND DRAIN PLACEMENT performed by Tonya Durán MD at [place] [address]  2021    I&D right lower abdominal wound in office-Dr. Yanez    [place]  DELIVERY ONLY N/A 10/12/2018     SECTION performed by Ebony Knowles MD at [address] [place] [address] Dr HURST         Family History   Problem Relation Age of Onset    Other Mother     Crohn's Disease Mother     Heart Disease Father    Ole Fanning Disease Sister     Rheum Arthritis Sister        Social History     Tobacco Use    Smoking status: Never Smoker    Smokeless tobacco: Never Used   Substance Use Topics    Alcohol use: No      Current Outpatient Medications   Medication Sig Dispense Refill    SPRINTEC 28 0.25-35 MG-MCG per tablet Take 1 tablet by mouth daily      adalimumab (HUMIRA) 40 MG/0.8ML injection Inject into the skin      ondansetron (ZOFRAN) 4 MG tablet Take 1 tablet by mouth every 6 hours as needed for Nausea or Vomiting (Patient not taking: Reported on 10/18/2021) 30 tablet 0     No current facility-administered medications for this visit. Allergies   Allergen Reactions    Duricef [Cefadroxil] Anaphylaxis    Penicillins Rash       Subjective:     Review of Systems   Constitutional: Negative for activity change, appetite change, chills, diaphoresis, fatigue, fever and unexpected weight change. HENT: Negative for congestion, dental problem, drooling, ear discharge, ear pain, facial swelling, hearing loss, mouth sores, nosebleeds, postnasal drip, rhinorrhea, sinus pressure, sneezing, sore throat, tinnitus, trouble swallowing and voice change. Eyes: Negative for photophobia, pain, discharge, redness, itching and visual disturbance. Respiratory: Negative for apnea, cough, choking, chest tightness, shortness of breath, wheezing and stridor. Cardiovascular: Negative for chest pain, palpitations and leg swelling. Gastrointestinal: Negative for abdominal distention, abdominal pain, anal bleeding, blood in stool, constipation, diarrhea, nausea, rectal pain and vomiting. Endocrine: Negative. Genitourinary: Negative for decreased urine volume, difficulty urinating, dyspareunia, dysuria, enuresis, flank pain, frequency, genital sores, hematuria, menstrual problem, pelvic pain, urgency, vaginal bleeding, vaginal discharge and vaginal pain.    Musculoskeletal: Negative for arthralgias, back pain, gait problem, joint swelling, myalgias, neck pain and neck stiffness. Skin: Positive for wound. Negative for color change, pallor and rash. Allergic/Immunologic: Negative. Neurological: Negative for dizziness, tremors, seizures, syncope, facial asymmetry, speech difficulty, weakness, light-headedness, numbness and headaches. Hematological: Negative for adenopathy. Does not bruise/bleed easily. Psychiatric/Behavioral: Negative for agitation, behavioral problems, confusion, decreased concentration, dysphoric mood, hallucinations, self-injury, sleep disturbance and suicidal ideas. The patient is not nervous/anxious and is not hyperactive. Objective:   /62 (Site: Left Upper Arm, Position: Sitting, Cuff Size: Medium Adult)   Pulse 94   Temp 97.2 °F (36.2 °C) (Tympanic)   Resp 14   Ht 4' 11\" (1.499 m)   Wt 84 lb 14.4 oz (38.5 kg)   SpO2 100%   BMI 17.15 kg/m²      Wt Readings from Last 3 Encounters:   10/18/21 84 lb 14.4 oz (38.5 kg)   10/11/21 82 lb 1.6 oz (37.2 kg)   10/07/21 80 lb 1.6 oz (36.3 kg)     Physical Exam  Vitals reviewed. Constitutional:       General: She is not in acute distress. Appearance: She is well-developed. She is not diaphoretic. HENT:      Head: Normocephalic and atraumatic. Right Ear: External ear normal.      Left Ear: External ear normal.      Nose: Nose normal.   Eyes:      General: No scleral icterus. Right eye: No discharge. Left eye: No discharge. Conjunctiva/sclera: Conjunctivae normal.   Cardiovascular:      Rate and Rhythm: Normal rate and regular rhythm. Heart sounds: Normal heart sounds. Pulmonary:      Effort: Pulmonary effort is normal. No respiratory distress. Breath sounds: Normal breath sounds. No wheezing or rales. Chest:      Chest wall: No tenderness. Abdominal:      General: Bowel sounds are normal. There is no distension. Palpations: Abdomen is soft. There is no mass. Tenderness: There is no abdominal tenderness. There is no guarding or rebound. Hernia: There is no hernia in the ventral area. Musculoskeletal:         General: No tenderness. Normal range of motion. Cervical back: Normal range of motion and neck supple. Skin:     General: Skin is warm and dry. Coloration: Skin is not pale. Findings: Wound present. No erythema or rash. Neurological:      Mental Status: She is alert and oriented to person, place, and time. Cranial Nerves: No cranial nerve deficit. Psychiatric:         Behavior: Behavior normal.         Thought Content: Thought content normal.         Judgment: Judgment normal.         Lab Results   Component Value Date    WBC 12.2 (H) 2021    HGB 11.5 (L) 2021    HCT 35.4 2021    MCV 80 2021     (H) 2021     Lab Results   Component Value Date     2021    K 3.9 2021    K 3.8 2021     2021    CO2 30 2021    BUN 6 2021    CREATININE 0.52 2021    GLUCOSE 105 2021    GLUCOSE 108 2019    CALCIUM 8.9 2021      Patient Active Problem List   Diagnosis    Single delivery by     Abdominal pain    Crohn's disease of large intestine without complication ([address]    Intra-abdominal abscess ([address]    Crohn's disease of small intestine with abscess ([address]    Severe malnutrition ([address]     Assessment:     1. Right abdominal wall abscess   2. History excisional debridement right abdominal wall nonhealing JAYMIE drain site (2021)  3. History exploratory laparotomy with washout and drain placement (2021)  4. Underweight secondary to crohn's disease    Plan:     1. Abdomen benign. Right sided wound healing well. Scab noted but otherwise no signs of infection. 2. No antibiotics  3. Appetite doing well. Continue diet as tolerated. Weight stable. 4. Bowel function doing well. Stool softeners as needed. 5. Continue work on light duty for 2 more weeks  6.  Follow up with GI as directed  1. Follow up as needed. Signs and symptoms reviewed with patient that would be concerning and need her to return to office for re-evaluation. Patient states she will call if she has questions or concerns.       Electronically signed by PRAVEENA Richardson CNP on 10/18/2021 at 10:04 PM

## 2021-11-05 ENCOUNTER — HOSPITAL ENCOUNTER (INPATIENT)
Age: 31
LOS: 4 days | Discharge: HOME | DRG: 871 | End: 2021-11-09
Attending: EMERGENCY MEDICINE | Admitting: SURGERY
Payer: COMMERCIAL

## 2021-11-05 ENCOUNTER — APPOINTMENT (OUTPATIENT)
Dept: CT IMAGING | Age: 31
DRG: 871 | End: 2021-11-05
Payer: COMMERCIAL

## 2021-11-05 ENCOUNTER — TELEPHONE (OUTPATIENT)
Dept: SURGERY | Age: 31
End: 2021-11-05

## 2021-11-05 DIAGNOSIS — K50.919 CROHN'S DISEASE WITH COMPLICATION, UNSPECIFIED GASTROINTESTINAL TRACT LOCATION (HCC): ICD-10-CM

## 2021-11-05 DIAGNOSIS — K65.1 INTRA-ABDOMINAL ABSCESS (HCC): Primary | ICD-10-CM

## 2021-11-05 DIAGNOSIS — A41.9 SEPTICEMIA (HCC): ICD-10-CM

## 2021-11-05 LAB
ALBUMIN SERPL-MCNC: 3.4 G/DL (ref 3.5–5.1)
ALP BLD-CCNC: 233 U/L (ref 38–126)
ALT SERPL-CCNC: 10 U/L (ref 11–66)
ANION GAP SERPL CALCULATED.3IONS-SCNC: 14 MEQ/L (ref 8–16)
AST SERPL-CCNC: 15 U/L (ref 5–40)
BACTERIA: ABNORMAL /HPF
BASOPHILS # BLD: 0.2 %
BASOPHILS ABSOLUTE: 0 THOU/MM3 (ref 0–0.1)
BILIRUB SERPL-MCNC: 0.8 MG/DL (ref 0.3–1.2)
BILIRUBIN URINE: NEGATIVE
BLOOD, URINE: ABNORMAL
BUN BLDV-MCNC: 4 MG/DL (ref 7–22)
C-REACTIVE PROTEIN: 14.31 MG/DL (ref 0–1)
CALCIUM SERPL-MCNC: 9 MG/DL (ref 8.5–10.5)
CASTS 2: ABNORMAL /LPF
CASTS UA: ABNORMAL /LPF
CHARACTER, URINE: CLEAR
CHLORIDE BLD-SCNC: 98 MEQ/L (ref 98–111)
CO2: 23 MEQ/L (ref 23–33)
COLOR: YELLOW
CREAT SERPL-MCNC: 0.5 MG/DL (ref 0.4–1.2)
CRYSTALS, UA: ABNORMAL
EOSINOPHIL # BLD: 0.1 %
EOSINOPHILS ABSOLUTE: 0 THOU/MM3 (ref 0–0.4)
EPITHELIAL CELLS, UA: ABNORMAL /HPF
ERYTHROCYTE [DISTWIDTH] IN BLOOD BY AUTOMATED COUNT: 15.9 % (ref 11.5–14.5)
ERYTHROCYTE [DISTWIDTH] IN BLOOD BY AUTOMATED COUNT: 48.5 FL (ref 35–45)
GFR SERPL CREATININE-BSD FRML MDRD: > 90 ML/MIN/1.73M2
GLUCOSE BLD-MCNC: 146 MG/DL (ref 70–108)
GLUCOSE URINE: NEGATIVE MG/DL
HCT VFR BLD CALC: 32.3 % (ref 37–47)
HEMOGLOBIN: 10 GM/DL (ref 12–16)
IMMATURE GRANS (ABS): 0.13 THOU/MM3 (ref 0–0.07)
IMMATURE GRANULOCYTES: 0.7 %
KETONES, URINE: NEGATIVE
LEUKOCYTE ESTERASE, URINE: NEGATIVE
LYMPHOCYTES # BLD: 7 %
LYMPHOCYTES ABSOLUTE: 1.2 THOU/MM3 (ref 1–4.8)
MCH RBC QN AUTO: 26 PG (ref 26–33)
MCHC RBC AUTO-ENTMCNC: 31 GM/DL (ref 32.2–35.5)
MCV RBC AUTO: 83.9 FL (ref 81–99)
MISCELLANEOUS 2: ABNORMAL
MONOCYTES # BLD: 5.8 %
MONOCYTES ABSOLUTE: 1 THOU/MM3 (ref 0.4–1.3)
NITRITE, URINE: NEGATIVE
NUCLEATED RED BLOOD CELLS: 0 /100 WBC
OSMOLALITY CALCULATION: 269.6 MOSMOL/KG (ref 275–300)
PH UA: 6.5 (ref 5–9)
PLATELET # BLD: 544 THOU/MM3 (ref 130–400)
PMV BLD AUTO: 8.1 FL (ref 9.4–12.4)
POTASSIUM SERPL-SCNC: 3.7 MEQ/L (ref 3.5–5.2)
PREGNANCY, SERUM: NEGATIVE
PROCALCITONIN: 0.05 NG/ML (ref 0.01–0.09)
PROTEIN UA: NEGATIVE
RBC # BLD: 3.85 MILL/MM3 (ref 4.2–5.4)
RBC URINE: ABNORMAL /HPF
RENAL EPITHELIAL, UA: ABNORMAL
SEG NEUTROPHILS: 86.2 %
SEGMENTED NEUTROPHILS ABSOLUTE COUNT: 15.1 THOU/MM3 (ref 1.8–7.7)
SODIUM BLD-SCNC: 135 MEQ/L (ref 135–145)
SPECIFIC GRAVITY, URINE: > 1.03 (ref 1–1.03)
TOTAL PROTEIN: 7.8 G/DL (ref 6.1–8)
UROBILINOGEN, URINE: 1 EU/DL (ref 0–1)
WBC # BLD: 17.5 THOU/MM3 (ref 4.8–10.8)
WBC UA: ABNORMAL /HPF
YEAST: ABNORMAL

## 2021-11-05 PROCEDURE — 86140 C-REACTIVE PROTEIN: CPT

## 2021-11-05 PROCEDURE — 6370000000 HC RX 637 (ALT 250 FOR IP): Performed by: EMERGENCY MEDICINE

## 2021-11-05 PROCEDURE — 36415 COLL VENOUS BLD VENIPUNCTURE: CPT

## 2021-11-05 PROCEDURE — 2500000003 HC RX 250 WO HCPCS: Performed by: PHYSICIAN ASSISTANT

## 2021-11-05 PROCEDURE — 96374 THER/PROPH/DIAG INJ IV PUSH: CPT

## 2021-11-05 PROCEDURE — 1200000000 HC SEMI PRIVATE

## 2021-11-05 PROCEDURE — 96375 TX/PRO/DX INJ NEW DRUG ADDON: CPT

## 2021-11-05 PROCEDURE — 84703 CHORIONIC GONADOTROPIN ASSAY: CPT

## 2021-11-05 PROCEDURE — 84145 PROCALCITONIN (PCT): CPT

## 2021-11-05 PROCEDURE — 99282 EMERGENCY DEPT VISIT SF MDM: CPT

## 2021-11-05 PROCEDURE — 81001 URINALYSIS AUTO W/SCOPE: CPT

## 2021-11-05 PROCEDURE — 6360000002 HC RX W HCPCS: Performed by: PHYSICIAN ASSISTANT

## 2021-11-05 PROCEDURE — 74177 CT ABD & PELVIS W/CONTRAST: CPT

## 2021-11-05 PROCEDURE — 2500000003 HC RX 250 WO HCPCS: Performed by: EMERGENCY MEDICINE

## 2021-11-05 PROCEDURE — 6360000004 HC RX CONTRAST MEDICATION: Performed by: EMERGENCY MEDICINE

## 2021-11-05 PROCEDURE — 6360000002 HC RX W HCPCS: Performed by: EMERGENCY MEDICINE

## 2021-11-05 PROCEDURE — 85025 COMPLETE CBC W/AUTO DIFF WBC: CPT

## 2021-11-05 PROCEDURE — 2580000003 HC RX 258: Performed by: SURGERY

## 2021-11-05 PROCEDURE — 2580000003 HC RX 258: Performed by: EMERGENCY MEDICINE

## 2021-11-05 PROCEDURE — 80053 COMPREHEN METABOLIC PANEL: CPT

## 2021-11-05 RX ORDER — SODIUM CHLORIDE 9 MG/ML
INJECTION, SOLUTION INTRAVENOUS CONTINUOUS
Status: DISCONTINUED | OUTPATIENT
Start: 2021-11-05 | End: 2021-11-09 | Stop reason: HOSPADM

## 2021-11-05 RX ORDER — SODIUM CHLORIDE 9 MG/ML
INJECTION, SOLUTION INTRAVENOUS CONTINUOUS
Status: DISCONTINUED | OUTPATIENT
Start: 2021-11-05 | End: 2021-11-05

## 2021-11-05 RX ORDER — ACETAMINOPHEN 325 MG/1
650 TABLET ORAL ONCE
Status: COMPLETED | OUTPATIENT
Start: 2021-11-05 | End: 2021-11-05

## 2021-11-05 RX ORDER — SODIUM CHLORIDE 9 MG/ML
25 INJECTION, SOLUTION INTRAVENOUS PRN
Status: DISCONTINUED | OUTPATIENT
Start: 2021-11-05 | End: 2021-11-09 | Stop reason: HOSPADM

## 2021-11-05 RX ORDER — 0.9 % SODIUM CHLORIDE 0.9 %
30 INTRAVENOUS SOLUTION INTRAVENOUS ONCE
Status: COMPLETED | OUTPATIENT
Start: 2021-11-05 | End: 2021-11-05

## 2021-11-05 RX ORDER — ONDANSETRON 4 MG/1
4 TABLET, ORALLY DISINTEGRATING ORAL EVERY 8 HOURS PRN
Status: DISCONTINUED | OUTPATIENT
Start: 2021-11-05 | End: 2021-11-06

## 2021-11-05 RX ORDER — CIPROFLOXACIN 2 MG/ML
400 INJECTION, SOLUTION INTRAVENOUS ONCE
Status: DISCONTINUED | OUTPATIENT
Start: 2021-11-05 | End: 2021-11-09 | Stop reason: HOSPADM

## 2021-11-05 RX ORDER — MORPHINE SULFATE 2 MG/ML
2 INJECTION, SOLUTION INTRAMUSCULAR; INTRAVENOUS ONCE
Status: COMPLETED | OUTPATIENT
Start: 2021-11-05 | End: 2021-11-05

## 2021-11-05 RX ORDER — CIPROFLOXACIN 2 MG/ML
400 INJECTION, SOLUTION INTRAVENOUS EVERY 12 HOURS
Status: DISCONTINUED | OUTPATIENT
Start: 2021-11-06 | End: 2021-11-09 | Stop reason: HOSPADM

## 2021-11-05 RX ORDER — SODIUM CHLORIDE 0.9 % (FLUSH) 0.9 %
5-40 SYRINGE (ML) INJECTION PRN
Status: DISCONTINUED | OUTPATIENT
Start: 2021-11-05 | End: 2021-11-09 | Stop reason: HOSPADM

## 2021-11-05 RX ORDER — SODIUM CHLORIDE 0.9 % (FLUSH) 0.9 %
5-40 SYRINGE (ML) INJECTION EVERY 12 HOURS SCHEDULED
Status: DISCONTINUED | OUTPATIENT
Start: 2021-11-05 | End: 2021-11-09 | Stop reason: HOSPADM

## 2021-11-05 RX ORDER — MORPHINE SULFATE 2 MG/ML
2 INJECTION, SOLUTION INTRAMUSCULAR; INTRAVENOUS
Status: DISCONTINUED | OUTPATIENT
Start: 2021-11-05 | End: 2021-11-09 | Stop reason: HOSPADM

## 2021-11-05 RX ORDER — MORPHINE SULFATE 4 MG/ML
4 INJECTION, SOLUTION INTRAMUSCULAR; INTRAVENOUS
Status: DISCONTINUED | OUTPATIENT
Start: 2021-11-05 | End: 2021-11-09 | Stop reason: HOSPADM

## 2021-11-05 RX ORDER — ONDANSETRON 2 MG/ML
4 INJECTION INTRAMUSCULAR; INTRAVENOUS EVERY 6 HOURS PRN
Status: DISCONTINUED | OUTPATIENT
Start: 2021-11-05 | End: 2021-11-09 | Stop reason: HOSPADM

## 2021-11-05 RX ADMIN — MORPHINE SULFATE 2 MG: 2 INJECTION, SOLUTION INTRAMUSCULAR; INTRAVENOUS at 19:24

## 2021-11-05 RX ADMIN — SODIUM CHLORIDE: 9 INJECTION, SOLUTION INTRAVENOUS at 23:07

## 2021-11-05 RX ADMIN — FAMOTIDINE 20 MG: 10 INJECTION, SOLUTION INTRAVENOUS at 23:36

## 2021-11-05 RX ADMIN — MORPHINE SULFATE 4 MG: 4 INJECTION, SOLUTION INTRAMUSCULAR; INTRAVENOUS at 23:37

## 2021-11-05 ASSESSMENT — PAIN DESCRIPTION - FREQUENCY: FREQUENCY: CONTINUOUS

## 2021-11-05 ASSESSMENT — PAIN DESCRIPTION - LOCATION
LOCATION: ABDOMEN

## 2021-11-05 ASSESSMENT — PAIN DESCRIPTION - ORIENTATION
ORIENTATION: RIGHT

## 2021-11-05 ASSESSMENT — PAIN DESCRIPTION - PAIN TYPE
TYPE: ACUTE PAIN

## 2021-11-05 ASSESSMENT — PAIN SCALES - GENERAL
PAINLEVEL_OUTOF10: 8
PAINLEVEL_OUTOF10: 5
PAINLEVEL_OUTOF10: 4
PAINLEVEL_OUTOF10: 8

## 2021-11-05 NOTE — LETTER
[address]  [place] [address]  Phone: [phone]  Surgery    November 9, 2021     Patient: Dann Hightower   MR Number: [id number]   YOB: 1990   Date of Visit: 11/5/2021       To whom it may concern:    Mayte Fields was admitted from 11/6-11/9 to [facility]. Please excuse her from work until at least 11/15/21. If you have questions, please do not hesitate to call our office [phone].     Sincerely,      Rachael Pendleton CNP

## 2021-11-05 NOTE — ED TRIAGE NOTES
Patient presents to ER with complaints of possible abscess that started 2 days ago in right lower abdomen region. Patient reports history of infection at drain tube site from previous surgery.

## 2021-11-05 NOTE — TELEPHONE ENCOUNTER
Patient's  contacted general surgery service line. Patient underwent surgical washout and debridement of abdominal wall abscess with Dr. Ceci Carlton approximately 1 month ago. Patient is been following with Dr. Sonja Vasquez office for nonhealing JAYMIE drain site. Patient's  states that today patient began having pain identical to that which she had when abscess first formed. Patient relays information that her abdomen is now hard, she had nausea and and multiple episodes of vomiting over the past 24 hours, she has night sweats, and patient  is asking how to proceed. Due to similar symptoms as previous abscess, as well as apparent presence of fever and GI symptoms; patient advised to come to ER for evaluation. Concern would be for reformation of abscess possible sepsis. Patient  and patient agreeable to plan. They will be coming to [facility] emergency room as soon as they are able. No further questions at this time. All questions answered.   Electronically signed by Trey Garcia PA-C on 11/5/2021 at 5:28 PM

## 2021-11-05 NOTE — ED PROVIDER NOTES
[address] ENCOUNTER      PATIENT NAME: Nia Bradley  MRN: [id number]  : 1990  RIBEIRO: 2021  PROVIDER: Nilesh Dorantes MD      CHIEF COMPLAINT       Chief Complaint   Patient presents with    Abscess       Patient is seen and evaluated in a timely fashion. Nurses Notes are reviewed and I agree except as noted in the HPI. HISTORY OF PRESENT ILLNESS    Nia Bradley is a 36 y.o. female who presents to Emergency Department with Abscess     This patient 81-year-old female with past medical history of Crohn's disease and intra-abdominal abscess. Patient was admitted on 2021 at [address] [place] [address] because of intra-abdominal free air of unknown etiology, generalized abdominal pain, leukocytosis, and Crohn's disease of small bowel with abscess. Patient underwent exploratory laparotomy with washout of large intra-abdominal abscessand drain placement x2 with Beau on 2021. Patient states over last 2 days, she has been experiencing increasing abdominal wall swelling, pain and abdominal wall tendereness. She is concerned recurrence of abscess. She has no fever. She feels chills. She is tachycardic on arrival.  Her pain is to moderate mild from periumbilical area. This HPI was provided by patient.  *    REVIEW OF SYSTEMS   Ten-point review of systems is negative except those documented in above HPI including constitutional, HEENT, respiratory, cardiovascular, gastrointestinal, genitourinary, musculoskeletal, skin, neurological, hematological and behavioral.      PAST MEDICAL HISTORY     Past Medical History:   Diagnosis Date    Crohn's colitis [facility])        SURGICAL HISTORY       Past Surgical History:   Procedure Laterality Date    CT DRAIN SOFT TISSUE ABSCESS  2021    CT DRAIN SOFT TISSUE ABSCESS 2021 [facility] CT SCAN    CT DRAIN SOFT TISSUE ABSCESS  2021    CT DRAIN SOFT TISSUE ABSCESS 2021 [facility] CT SCAN    LAPAROTOMY N/A 2021    LAPAROTOMY EXPLORATORY, WASHOUT, AND DRAIN PLACEMENT performed by Storm Chung MD at [address]  2021    I&D right lower abdominal wound in office-Dr. Yanez    [place]  DELIVERY ONLY N/A 10/12/2018     SECTION performed by Humaira Cantu MD at [address]       Previous Medications    ADALIMUMAB (HUMIRA) 40 MG/0.8ML INJECTION    Inject into the skin    ONDANSETRON (ZOFRAN) 4 MG TABLET    Take 1 tablet by mouth every 6 hours as needed for Nausea or Vomiting    SPRINTEC 28 0.25-35 MG-MCG PER TABLET    Take 1 tablet by mouth daily       ALLERGIES     Duricef [cefadroxil] and Penicillins    FAMILY HISTORY     She indicated that her mother is alive. She indicated that her father is alive. She indicated that both of her sisters are alive. She indicated that her brother is alive. family history includes Crohn's Disease in her mother; [place] Disease in her sister; Heart Disease in her father; Other in her mother; Rheum Arthritis in her sister. SOCIAL HISTORY      reports that she has never smoked. She has never used smokeless tobacco. She reports that she does not drink alcohol and does not use drugs. PHYSICAL EXAM      height is 4' 11\" (1.499 m) and weight is 84 lb (38.1 kg). Her oral temperature is 100.5 °F (38.1 °C). Her blood pressure is 113/71 and her pulse is 130. Her respiration is 16 and oxygen saturation is 99%. Physical Exam  Vitals and nursing note reviewed. Constitutional:       Appearance: She is well-developed. She is not diaphoretic. HENT:      Head: Normocephalic and atraumatic. Nose: Nose normal.   Eyes:      General: No scleral icterus. Right eye: No discharge. Left eye: No discharge. Conjunctiva/sclera: Conjunctivae normal.      Pupils: Pupils are equal, round, and reactive to light. Neck:      Vascular: No JVD. Trachea: No tracheal deviation. Cardiovascular:      Rate and Rhythm: Regular rhythm. Tachycardia present. Heart sounds: Normal heart sounds. No murmur heard. No friction rub. No gallop. Pulmonary:      Effort: Pulmonary effort is normal. No respiratory distress. Breath sounds: Normal breath sounds. No stridor. No wheezing or rales. Chest:      Chest wall: No tenderness. Abdominal:      General: Bowel sounds are normal. There is no distension. Palpations: Abdomen is soft. There is no mass. Tenderness: There is no abdominal tenderness. There is no guarding or rebound. Hernia: No hernia is present. Comments: Midline surgical scar healing well. Diffuse abdominal tenderness, no guarding or rebound pain. Normal active BS. No surgical abdomen. Musculoskeletal:         General: No tenderness or deformity. Cervical back: Normal range of motion and neck supple. Lymphadenopathy:      Cervical: No cervical adenopathy. Skin:     General: Skin is warm and dry. Capillary Refill: Capillary refill takes less than 2 seconds. Coloration: Skin is not pale. Findings: No erythema or rash. Neurological:      Mental Status: She is alert and oriented to person, place, and time. Cranial Nerves: No cranial nerve deficit. Sensory: No sensory deficit. Motor: No abnormal muscle tone. Coordination: Coordination normal.      Deep Tendon Reflexes: Reflexes normal.   Psychiatric:         Behavior: Behavior normal.         Thought Content: Thought content normal.         Judgment: Judgment normal.       ANCILLARY TEST RESULTS   EKG:    Interpreted by me  Not indicated    LAB RESULTS:  Results for orders placed or performed during the hospital encounter of 11/05/21   CBC Auto Differential   Result Value Ref Range    WBC 17.5 (H) 4.8 - 10.8 thou/mm3    RBC 3.85 (L) 4.20 - 5.40 mill/mm3    Hemoglobin 10.0 (L) 12.0 - 16.0 gm/dl    Hematocrit 32.3 (L) 37.0 - 47.0 %    MCV 83.9 81.0 - 99.0 fL    MCH 26.0 26.0 - 33.0 pg    MCHC 31.0 (L) 32.2 - 35.5 gm/dl    RDW-CV 15.9 (H) 11.5 - 14.5 %    RDW-SD 48.5 (H) 35.0 - 45.0 fL    Platelets 859 (H) 234 - 400 thou/mm3    MPV 8.1 (L) 9.4 - 12.4 fL    Seg Neutrophils 86.2 %    Lymphocytes 7.0 %    Monocytes 5.8 %    Eosinophils 0.1 %    Basophils 0.2 %    Immature Granulocytes 0.7 %    Segs Absolute 15.1 (H) 1 - 7 thou/mm3    Lymphocytes Absolute 1.2 1.0 - 4.8 thou/mm3    Monocytes Absolute 1.0 0.4 - 1.3 thou/mm3    Eosinophils Absolute 0.0 0.0 - 0.4 thou/mm3    Basophils Absolute 0.0 0.0 - 0.1 thou/mm3    Immature Grans (Abs) 0.13 (H) 0.00 - 0.07 thou/mm3    nRBC 0 /100 wbc   Comprehensive Metabolic Panel   Result Value Ref Range    Glucose 146 (H) 70 - 108 mg/dL    CREATININE 0.5 0.4 - 1.2 mg/dL    BUN 4 (L) 7 - 22 mg/dL    Sodium 135 135 - 145 meq/L    Potassium 3.7 3.5 - 5.2 meq/L    Chloride 98 98 - 111 meq/L    CO2 23 23 - 33 meq/L    Calcium 9.0 8.5 - 10.5 mg/dL    AST 15 5 - 40 U/L    Alkaline Phosphatase 233 (H) 38 - 126 U/L    Total Protein 7.8 6.1 - 8.0 g/dL    Albumin 3.4 (L) 3.5 - 5.1 g/dL    Total Bilirubin 0.8 0.3 - 1.2 mg/dL    ALT 10 (L) 11 - 66 U/L   Procalcitonin   Result Value Ref Range    Procalcitonin 0.05 0.01 - 0.09 ng/mL   C-reactive protein   Result Value Ref Range    CRP 14.31 (H) 0.00 - 1.00 mg/dl   HCG Qualitative, Serum   Result Value Ref Range    Preg, Serum NEGATIVE NEGATIVE   Anion Gap   Result Value Ref Range    Anion Gap 14.0 8.0 - 16.0 meq/L   Glomerular Filtration Rate, Estimated   Result Value Ref Range    Est, Glom Filt Rate >90 ml/min/1.73m2   Osmolality   Result Value Ref Range    Osmolality Calc 269.6 (L) 275.0 - 300.0 mOsmol/kg       RADIOLOGY REPORTS  CT ABDOMEN PELVIS W IV CONTRAST Additional Contrast? None   Final Result   1. Multiple fluid collections in the right abdomen suspicious for abscesses.    2. Mural thickening, adjacent inflammatory stranding and ascites in multiple bowel loops, likely secondary to active inflammatory bowel disease. Final report electronically signed by Dr. Claudette Loth on 11/5/2021 8:35 PM          ED [facility]     ED Vitals:  Vitals:    11/05/21 1827   BP: 113/71   Pulse: 130   Resp: 16   Temp: 100.5 °F (38.1 °C)   TempSrc: Oral   SpO2: 99%   Weight: 84 lb (38.1 kg)   Height: 4' 11\" (1.499 m)       6:42 PM EDT   INITIAL ASSESSMENT AND PLAN  Differential Diagnosis: Sepsis, recurrence of intra-abdominal abscess, SBO, Crohn's flareup, dehydration, UTI, pyelonephritis  Plan: Large-bore IV, normal saline 30 mL/kg bolus, IV Morphine for pain. Labs. CT A/P with IV contrast. GS consult. 9:08 PM   REASSESSMENT AND PLAN  DISPOSITION Admitted 11/05/2021 09:21:49 PM    Medications   0.9 % sodium chloride bolus (1,143 mLs IntraVENous New Bag 11/5/21 1924)   ciprofloxacin (CIPRO) IVPB 400 mg (has no administration in time range)   metronidazole (FLAGYL) 500 mg in NaCl 100 mL IVPB premix (has no administration in time range)   acetaminophen (TYLENOL) tablet 650 mg (has no administration in time range)   morphine (PF) injection 2 mg (2 mg IntraVENous Given 11/5/21 1924)   iopamidol (ISOVUE-370) 76 % injection 80 mL (80 mLs IntraVENous Given 11/5/21 2012)     She has SIRS. She was given normal saline bolus 30 ml/kg. Highly suspect she has recurrence of intra-abdominal abscess. WBC 17.5. Metabolic panel unremarkable. CRP 14.3. CT abdomen pelvis shows multiple fluid collection in the right abdomen suspicious for abscesses, mural thickening, adjacent inflammatory stranding and ascites in multiple bowel loops, likely secondary to active inflammatory bowel disease. IV Cipro and Flagyl given. Consult GS and admitted to [facility] SERVICES service. CRITICAL CARE   None    CONSULTS   General surgery    PROCEDURES   None    FINAL IMPRESSION AND DISPOSITION      1. Intra-abdominal abscess ([address]    2. Crohn's disease with complication, unspecified gastrointestinal tract location ([address]    3.  Septicemia [facility])        Admitted    PATIENT REFERRED TO:  No follow-up provider specified.     DISCHARGE MEDICATIONS:  New Prescriptions    No medications on file       (Please note that portions of this note were completed with a voice recognition program.  Efforts were made to edit the dictations but occasionally words aremis-transcribed.)    MD Dalton Scott MD  11/05/21 9797

## 2021-11-06 LAB
ALBUMIN SERPL-MCNC: 3 G/DL (ref 3.5–5.1)
ALP BLD-CCNC: 203 U/L (ref 38–126)
ALT SERPL-CCNC: 12 U/L (ref 11–66)
ANION GAP SERPL CALCULATED.3IONS-SCNC: 13 MEQ/L (ref 8–16)
AST SERPL-CCNC: 11 U/L (ref 5–40)
BASOPHILS # BLD: 0.2 %
BASOPHILS ABSOLUTE: 0 THOU/MM3 (ref 0–0.1)
BILIRUB SERPL-MCNC: 0.6 MG/DL (ref 0.3–1.2)
BUN BLDV-MCNC: 3 MG/DL (ref 7–22)
CALCIUM SERPL-MCNC: 8.3 MG/DL (ref 8.5–10.5)
CHLORIDE BLD-SCNC: 103 MEQ/L (ref 98–111)
CO2: 22 MEQ/L (ref 23–33)
CREAT SERPL-MCNC: 0.5 MG/DL (ref 0.4–1.2)
EOSINOPHIL # BLD: 0.1 %
EOSINOPHILS ABSOLUTE: 0 THOU/MM3 (ref 0–0.4)
ERYTHROCYTE [DISTWIDTH] IN BLOOD BY AUTOMATED COUNT: 15.9 % (ref 11.5–14.5)
ERYTHROCYTE [DISTWIDTH] IN BLOOD BY AUTOMATED COUNT: 15.9 % (ref 11.5–14.5)
ERYTHROCYTE [DISTWIDTH] IN BLOOD BY AUTOMATED COUNT: 48.3 FL (ref 35–45)
ERYTHROCYTE [DISTWIDTH] IN BLOOD BY AUTOMATED COUNT: 50.2 FL (ref 35–45)
GFR SERPL CREATININE-BSD FRML MDRD: > 90 ML/MIN/1.73M2
GLUCOSE BLD-MCNC: 100 MG/DL (ref 70–108)
HCT VFR BLD CALC: 28.8 % (ref 37–47)
HCT VFR BLD CALC: 29.9 % (ref 37–47)
HEMOGLOBIN: 9 GM/DL (ref 12–16)
HEMOGLOBIN: 9.2 GM/DL (ref 12–16)
IMMATURE GRANS (ABS): 0.11 THOU/MM3 (ref 0–0.07)
IMMATURE GRANULOCYTES: 0.6 %
LACTIC ACID, SEPSIS: 0.6 MMOL/L (ref 0.5–1.9)
LYMPHOCYTES # BLD: 7.2 %
LYMPHOCYTES ABSOLUTE: 1.3 THOU/MM3 (ref 1–4.8)
MCH RBC QN AUTO: 26.2 PG (ref 26–33)
MCH RBC QN AUTO: 26.2 PG (ref 26–33)
MCHC RBC AUTO-ENTMCNC: 30.8 GM/DL (ref 32.2–35.5)
MCHC RBC AUTO-ENTMCNC: 31.3 GM/DL (ref 32.2–35.5)
MCV RBC AUTO: 83.7 FL (ref 81–99)
MCV RBC AUTO: 85.2 FL (ref 81–99)
MONOCYTES # BLD: 6.2 %
MONOCYTES ABSOLUTE: 1.1 THOU/MM3 (ref 0.4–1.3)
NUCLEATED RED BLOOD CELLS: 0 /100 WBC
PLATELET # BLD: 413 THOU/MM3 (ref 130–400)
PLATELET # BLD: 445 THOU/MM3 (ref 130–400)
PMV BLD AUTO: 8.1 FL (ref 9.4–12.4)
PMV BLD AUTO: 8.1 FL (ref 9.4–12.4)
POTASSIUM REFLEX MAGNESIUM: 3.7 MEQ/L (ref 3.5–5.2)
RBC # BLD: 3.44 MILL/MM3 (ref 4.2–5.4)
RBC # BLD: 3.51 MILL/MM3 (ref 4.2–5.4)
SEG NEUTROPHILS: 85.7 %
SEGMENTED NEUTROPHILS ABSOLUTE COUNT: 15.4 THOU/MM3 (ref 1.8–7.7)
SODIUM BLD-SCNC: 138 MEQ/L (ref 135–145)
TOTAL PROTEIN: 6 G/DL (ref 6.1–8)
WBC # BLD: 18 THOU/MM3 (ref 4.8–10.8)
WBC # BLD: 18.4 THOU/MM3 (ref 4.8–10.8)

## 2021-11-06 PROCEDURE — 2580000003 HC RX 258: Performed by: SURGERY

## 2021-11-06 PROCEDURE — 85027 COMPLETE CBC AUTOMATED: CPT

## 2021-11-06 PROCEDURE — 2500000003 HC RX 250 WO HCPCS: Performed by: PHYSICIAN ASSISTANT

## 2021-11-06 PROCEDURE — 2580000003 HC RX 258: Performed by: PHYSICIAN ASSISTANT

## 2021-11-06 PROCEDURE — 83605 ASSAY OF LACTIC ACID: CPT

## 2021-11-06 PROCEDURE — 80053 COMPREHEN METABOLIC PANEL: CPT

## 2021-11-06 PROCEDURE — 1200000000 HC SEMI PRIVATE

## 2021-11-06 PROCEDURE — 6360000002 HC RX W HCPCS: Performed by: PHYSICIAN ASSISTANT

## 2021-11-06 PROCEDURE — 36415 COLL VENOUS BLD VENIPUNCTURE: CPT

## 2021-11-06 PROCEDURE — 85025 COMPLETE CBC W/AUTO DIFF WBC: CPT

## 2021-11-06 PROCEDURE — 99222 1ST HOSP IP/OBS MODERATE 55: CPT | Performed by: SURGERY

## 2021-11-06 PROCEDURE — 87040 BLOOD CULTURE FOR BACTERIA: CPT

## 2021-11-06 RX ORDER — 0.9 % SODIUM CHLORIDE 0.9 %
500 INTRAVENOUS SOLUTION INTRAVENOUS ONCE
Status: COMPLETED | OUTPATIENT
Start: 2021-11-06 | End: 2021-11-06

## 2021-11-06 RX ADMIN — FAMOTIDINE 20 MG: 10 INJECTION, SOLUTION INTRAVENOUS at 08:56

## 2021-11-06 RX ADMIN — MORPHINE SULFATE 4 MG: 4 INJECTION, SOLUTION INTRAMUSCULAR; INTRAVENOUS at 04:44

## 2021-11-06 RX ADMIN — METRONIDAZOLE 500 MG: 500 INJECTION, SOLUTION INTRAVENOUS at 04:46

## 2021-11-06 RX ADMIN — SODIUM CHLORIDE, PRESERVATIVE FREE 20 ML: 5 INJECTION INTRAVENOUS at 21:34

## 2021-11-06 RX ADMIN — SODIUM CHLORIDE, PRESERVATIVE FREE 10 ML: 5 INJECTION INTRAVENOUS at 08:56

## 2021-11-06 RX ADMIN — MORPHINE SULFATE 4 MG: 4 INJECTION, SOLUTION INTRAMUSCULAR; INTRAVENOUS at 12:16

## 2021-11-06 RX ADMIN — METRONIDAZOLE 500 MG: 500 INJECTION, SOLUTION INTRAVENOUS at 21:24

## 2021-11-06 RX ADMIN — MORPHINE SULFATE 2 MG: 2 INJECTION, SOLUTION INTRAMUSCULAR; INTRAVENOUS at 21:27

## 2021-11-06 RX ADMIN — CIPROFLOXACIN 400 MG: 2 INJECTION, SOLUTION INTRAVENOUS at 12:16

## 2021-11-06 RX ADMIN — METRONIDAZOLE 500 MG: 500 INJECTION, SOLUTION INTRAVENOUS at 14:30

## 2021-11-06 RX ADMIN — FAMOTIDINE 20 MG: 10 INJECTION, SOLUTION INTRAVENOUS at 21:09

## 2021-11-06 RX ADMIN — SODIUM CHLORIDE 500 ML: 9 INJECTION, SOLUTION INTRAVENOUS at 09:25

## 2021-11-06 RX ADMIN — MORPHINE SULFATE 4 MG: 4 INJECTION, SOLUTION INTRAMUSCULAR; INTRAVENOUS at 08:56

## 2021-11-06 RX ADMIN — CIPROFLOXACIN 400 MG: 2 INJECTION, SOLUTION INTRAVENOUS at 00:23

## 2021-11-06 ASSESSMENT — PAIN SCALES - GENERAL
PAINLEVEL_OUTOF10: 7
PAINLEVEL_OUTOF10: 0
PAINLEVEL_OUTOF10: 8
PAINLEVEL_OUTOF10: 4
PAINLEVEL_OUTOF10: 8
PAINLEVEL_OUTOF10: 8
PAINLEVEL_OUTOF10: 4

## 2021-11-06 ASSESSMENT — PAIN DESCRIPTION - FREQUENCY
FREQUENCY: CONTINUOUS
FREQUENCY: CONTINUOUS

## 2021-11-06 ASSESSMENT — PAIN DESCRIPTION - ORIENTATION
ORIENTATION: RIGHT
ORIENTATION: RIGHT;LOWER

## 2021-11-06 ASSESSMENT — PAIN DESCRIPTION - DESCRIPTORS: DESCRIPTORS: PRESSURE

## 2021-11-06 ASSESSMENT — PAIN DESCRIPTION - LOCATION
LOCATION: ABDOMEN
LOCATION: ABDOMEN

## 2021-11-06 ASSESSMENT — PAIN DESCRIPTION - PAIN TYPE
TYPE: ACUTE PAIN
TYPE: ACUTE PAIN

## 2021-11-06 ASSESSMENT — PAIN DESCRIPTION - PROGRESSION: CLINICAL_PROGRESSION: GRADUALLY WORSENING

## 2021-11-06 ASSESSMENT — PAIN DESCRIPTION - ONSET: ONSET: GRADUAL

## 2021-11-06 NOTE — H&P
General Surgery History and Physical  [facility]   Covering for Dr. Heidi Wilson    Pt Name: Dann Hightower  MRN: [id number]  YOB: 1990  Date of evaluation: 2021  Primary Care Physician: Deana Spencer MD  Patient evaluated at the request of  Dr. Simi Hyde  Reason for evaluation: peritoneal abscess  IMPRESSIONS:   1. Recurrent peritoneal abscess due to Crohn's disease  2. Last Humera therapy 2 weeks ago  3. SIRS   does not have any pertinent problems on file. PLANS:   1. Admit type: Inpatient  2. It is expected this patient's LOS will be: Greater than 2 midnights  3. Anticipated Disposition Upon Discharge: Home  4. Analgesics and antiemetics on a prn basis  5. IV hydration  6. Empiric antibiotic coverage  7. IR for perc drain placement  8. NPO except ice chips, sips of H2O  9. Fluid bolus  10. Sepsis - lactic acid, cultures  11. Dr. Heidi Wilson to resume care   SUBJECTIVE:   History of Chief Complaint:    Maya Moraes is a 36 y. o.female who presents with abdominal pain. The pain is described as sharp, and is moderate to severe in intensity. Pain is located in the RUQ, RLQ with radiation to the right back. Onset was 2 days ago. Symptoms have been gradually worsening since. Aggravating factors include activity. Alleviating factors include pain medications to some degree. She denies chills, fever, nausea and vomiting. She was admitted in 2021 for peritoneal abscess, underwent open drainage and drain placements. The right sided drain had drainage follow its removal, last reported drainage 10/6/21. She is on [place] per GI with the last dose 2 weeks ago. Past Medical History   has a past medical history of Crohn's colitis ([address]   Past Surgical History   has a past surgical history that includes [place] tooth extraction; pr  delivery only (N/A, 10/12/2018); CT ABSCESS DRAINAGE SOFT TISSUE (2021); CT ABSCESS DRAINAGE SOFT TISSUE (2021); laparotomy (N/A, 2021); and other surgical history (08/18/2021). Medications  Prior to Admission medications    Medication Sig Start Date End Date Taking? Authorizing Provider   [address] [address] 0.25-35 MG-MCG per tablet Take 1 tablet by mouth daily 9/3/21   Historical Provider, MD   adalimumab (HUMIRA) 40 MG/0.8ML injection Inject into the skin    Historical Provider, MD   ondansetron (ZOFRAN) 4 MG tablet Take 1 tablet by mouth every 6 hours as needed for Nausea or Vomiting  Patient not taking: Reported on 10/18/2021 8/9/21   Davie Osgood, APRN - CNP    Scheduled Meds:   sodium chloride  500 mL IntraVENous Once    ciprofloxacin  400 mg IntraVENous Once    sodium chloride flush  5-40 mL IntraVENous 2 times per day    famotidine (PEPCID) injection  20 mg IntraVENous BID    ciprofloxacin  400 mg IntraVENous Q12H    metroNIDAZOLE  500 mg IntraVENous Q8H     Continuous Infusions:   sodium chloride 100 mL/hr at 11/05/21 2307    sodium chloride       PRN Meds:.sodium chloride flush, sodium chloride, [DISCONTINUED] ondansetron **OR** ondansetron, morphine **OR** morphine  Allergies  is allergic to duricef [cefadroxil] and penicillins. Family History  family history includes Crohn's Disease in her mother; Ora Dotter Disease in her sister; Heart Disease in her father; Other in her mother; Rheum Arthritis in her sister. Social History   reports that she has never smoked. She has never used smokeless tobacco. She reports that she does not drink alcohol and does not use drugs.   Review of Systems:  General Denies any fever or chills  HEENT Denies any diplopia, tinnitus or vertigo  Resp Denies any shortness of breath, cough or wheezing  Cardiac Denies any chest pain, palpitations, claudication or edema  GI Denies any melena, hematochezia, hematemesis or pyrosis   Denies any frequency, urgency, hesitancy or incontinence  Heme Denies bruising or bleeding easily  Endocrine Denies any history of diabetes or thyroid disease  Neuro Denies any focal motor or sensory deficits  OBJECTIVE:   CURRENT VITALS:  height is 4' 11\" (1.499 m) and weight is 84 lb (38.1 kg). Her oral temperature is 100.7 °F (38.2 °C). Her blood pressure is 104/56 (abnormal) and her pulse is 103. Her respiration is 18 and oxygen saturation is 100%. Body mass index is 16.97 kg/m². Temperature Range (24h):Temp: 100.7 °F (38.2 °C) Temp  Av.6 °F (37.6 °C)  Min: 98.2 °F (36.8 °C)  Max: 100.7 °F (38.2 °C)  BP Range (62U): Systolic (87XGQ), THJ:893 , Min:99 , OBB:486     Diastolic (57TFL), LYK:16, Min:56, Max:71    Pulse Range (24h): Pulse  Av.8  Min: 98  Max: 130  Respiration Range (24h): Resp  Av  Min: 16  Max: 20  Current Pulse Ox (24h):  SpO2: 100 %  Pulse Ox Range (24h):  SpO2  Av.5 %  Min: 98 %  Max: 100 %  Oxygen Amount and Delivery:    CONSTITUTIONAL: Alert and oriented times 3, no acute distress and cooperative to examination with proper mood and affect. SKIN: Skin color, texture, turgor normal. No rashes or lesions. LYMPH: no cervical nodes, no inguinal nodes  HEENT: Head is normocephalic, atraumatic. EOMI, PERRLA. NECK: Supple, symmetrical, trachea midline, no adenopathy, thyroid symmetric, not enlarged and no tenderness, skin normal.  CHEST/LUNGS: chest symmetric with normal A/P diameter, normal respiratory rate and rhythm, lungs clear to auscultation without wheezes, rales or rhonchi. No accessory muscle use. Scars None   CARDIOVASCULAR: Heart sounds are normal.  Regular rate and rhythm without murmur, gallop or rub. Normal S1 and S2. Carotid and femoral pulses 2+/4 and equal bilaterally. ABDOMEN: mildly distended large midline scar scar(s) present. Normal bowel sounds. No bruits. Firm on the right side, no masses or organomegaly. no evidence of hernia. Percussion: Normal without hepatosplenomegally. Tenderness: Diffuse, worse on the right side. RECTAL: deferred, not clinically indicated  NEUROLOGIC: There are no focalizing motor or sensory deficits.  CN II-XII are grossly intact. Verna Fanning EXTREMITIES: no cyanosis, no clubbing and no edema. LABS:     Recent Labs     11/05/21  1906 11/05/21  2320 11/06/21  0444   WBC 17.5*  --  18.0*   HGB 10.0*  --  9.2*   HCT 32.3*  --  29.9*   *  --  445*     --  138   K 3.7  --  3.7   CL 98  --  103   CO2 23  --  22*   BUN 4*  --  3*   CREATININE 0.5  --  0.5   CALCIUM 9.0  --  8.3*   AST 15  --  11   ALT 10*  --  12   BILITOT 0.8  --  0.6   NITRU  --  NEGATIVE  --    COLORU  --  YELLOW  --    BACTERIA  --  NONE SEEN  --      RADIOLOGY:   I have personally reviewed the following films:  CT ABDOMEN PELVIS W IV CONTRAST Additional Contrast? None   Final Result   1. Multiple fluid collections in the right abdomen suspicious for abscesses. 2. Mural thickening, adjacent inflammatory stranding and ascites in multiple bowel loops, likely secondary to active inflammatory bowel disease. Final report electronically signed by Dr. Estela Arango on 11/5/2021 8:35 PM      IR Interventional Radiology Procedure Request    (Results Pending)       Thank you for the interesting evaluation. Further recommendations to follow.     Electronically signed by Mark Glover DO on 11/6/2021 at 9:10 AM

## 2021-11-07 LAB
ERYTHROCYTE [DISTWIDTH] IN BLOOD BY AUTOMATED COUNT: 15.9 % (ref 11.5–14.5)
ERYTHROCYTE [DISTWIDTH] IN BLOOD BY AUTOMATED COUNT: 50.2 FL (ref 35–45)
HCT VFR BLD CALC: 24.4 % (ref 37–47)
HEMOGLOBIN: 7.4 GM/DL (ref 12–16)
MCH RBC QN AUTO: 26.2 PG (ref 26–33)
MCHC RBC AUTO-ENTMCNC: 30.3 GM/DL (ref 32.2–35.5)
MCV RBC AUTO: 86.5 FL (ref 81–99)
PLATELET # BLD: 392 THOU/MM3 (ref 130–400)
PMV BLD AUTO: 8.6 FL (ref 9.4–12.4)
RBC # BLD: 2.82 MILL/MM3 (ref 4.2–5.4)
WBC # BLD: 10.2 THOU/MM3 (ref 4.8–10.8)

## 2021-11-07 PROCEDURE — 2580000003 HC RX 258: Performed by: SURGERY

## 2021-11-07 PROCEDURE — 2500000003 HC RX 250 WO HCPCS: Performed by: PHYSICIAN ASSISTANT

## 2021-11-07 PROCEDURE — 85027 COMPLETE CBC AUTOMATED: CPT

## 2021-11-07 PROCEDURE — 6360000002 HC RX W HCPCS: Performed by: PHYSICIAN ASSISTANT

## 2021-11-07 PROCEDURE — 36415 COLL VENOUS BLD VENIPUNCTURE: CPT

## 2021-11-07 PROCEDURE — 87040 BLOOD CULTURE FOR BACTERIA: CPT

## 2021-11-07 PROCEDURE — 1200000000 HC SEMI PRIVATE

## 2021-11-07 PROCEDURE — 99232 SBSQ HOSP IP/OBS MODERATE 35: CPT | Performed by: SURGERY

## 2021-11-07 PROCEDURE — 2580000003 HC RX 258: Performed by: PHYSICIAN ASSISTANT

## 2021-11-07 PROCEDURE — APPSS45 APP SPLIT SHARED TIME 31-45 MINUTES: Performed by: PHYSICIAN ASSISTANT

## 2021-11-07 RX ADMIN — FAMOTIDINE 20 MG: 10 INJECTION, SOLUTION INTRAVENOUS at 08:09

## 2021-11-07 RX ADMIN — METRONIDAZOLE 500 MG: 500 INJECTION, SOLUTION INTRAVENOUS at 05:40

## 2021-11-07 RX ADMIN — SODIUM CHLORIDE: 9 INJECTION, SOLUTION INTRAVENOUS at 08:12

## 2021-11-07 RX ADMIN — METRONIDAZOLE 500 MG: 500 INJECTION, SOLUTION INTRAVENOUS at 20:50

## 2021-11-07 RX ADMIN — CIPROFLOXACIN 400 MG: 2 INJECTION, SOLUTION INTRAVENOUS at 00:10

## 2021-11-07 RX ADMIN — CIPROFLOXACIN 400 MG: 2 INJECTION, SOLUTION INTRAVENOUS at 11:22

## 2021-11-07 RX ADMIN — MORPHINE SULFATE 4 MG: 4 INJECTION, SOLUTION INTRAMUSCULAR; INTRAVENOUS at 19:42

## 2021-11-07 RX ADMIN — FAMOTIDINE 20 MG: 10 INJECTION, SOLUTION INTRAVENOUS at 20:50

## 2021-11-07 RX ADMIN — METRONIDAZOLE 500 MG: 500 INJECTION, SOLUTION INTRAVENOUS at 14:30

## 2021-11-07 RX ADMIN — SODIUM CHLORIDE, PRESERVATIVE FREE 10 ML: 5 INJECTION INTRAVENOUS at 08:10

## 2021-11-07 RX ADMIN — CIPROFLOXACIN 400 MG: 2 INJECTION, SOLUTION INTRAVENOUS at 23:23

## 2021-11-07 RX ADMIN — SODIUM CHLORIDE: 9 INJECTION, SOLUTION INTRAVENOUS at 23:27

## 2021-11-07 RX ADMIN — MORPHINE SULFATE 4 MG: 4 INJECTION, SOLUTION INTRAMUSCULAR; INTRAVENOUS at 23:27

## 2021-11-07 ASSESSMENT — PAIN DESCRIPTION - DESCRIPTORS
DESCRIPTORS: DULL
DESCRIPTORS: ACHING

## 2021-11-07 ASSESSMENT — PAIN SCALES - GENERAL
PAINLEVEL_OUTOF10: 1
PAINLEVEL_OUTOF10: 5
PAINLEVEL_OUTOF10: 6
PAINLEVEL_OUTOF10: 0
PAINLEVEL_OUTOF10: 1
PAINLEVEL_OUTOF10: 0

## 2021-11-07 ASSESSMENT — PAIN DESCRIPTION - LOCATION
LOCATION: ABDOMEN
LOCATION: ABDOMEN

## 2021-11-07 ASSESSMENT — PAIN DESCRIPTION - ORIENTATION
ORIENTATION: RIGHT;LOWER
ORIENTATION: RIGHT;LOWER

## 2021-11-07 ASSESSMENT — PAIN DESCRIPTION - PROGRESSION
CLINICAL_PROGRESSION: GRADUALLY WORSENING
CLINICAL_PROGRESSION: GRADUALLY WORSENING

## 2021-11-07 ASSESSMENT — PAIN DESCRIPTION - PAIN TYPE
TYPE: VISCERAL PAIN
TYPE: VISCERAL PAIN

## 2021-11-07 NOTE — PROGRESS NOTES
[address], [place]  On behalf of Dr. Dante Villanueva  Daily Progress Note  Pt Name: Priya Figueroa Record Number: [id number]  Date of Birth 1990   Today's Date: 11/7/2021  Chief complaint: Abdominal pain  ASSESSMENT:   1. Hospital day # 2   2. Recurrent peritoneal abscess due to Crohn's disease  3. Last Humera therapy 2 weeks ago  4. SIRS   5.  has a past medical history of Crohn's colitis ([facility] [address] RECOMMENDATIONS:   1. Analgesics and antiemetics on a prn basis  2. IV hydration  3. Empiric antibiotic coverage  4. IR for perc drain placement  5. NPO except ice chips, sips of H2O  6. Fluid bolus  7. Sepsis - lactic acid, cultures  8. Planning IR drain on 11/8    SUBJECTIVE:   Mariann Lloyd is doing well, states abdominal pain is improved, complains of some diarrhea, tolerating clear liquids. She admits to nausea or vomiting, has passed flatus and had a loose bowel movement today. She is tolerating a ADULT DIET; Clear Liquid. Her pain is well controlled on current medications. She has been ambulating in the halls. States yesterday they became an open wound over the top of her abdominal abscess, and a moderate amount of green purulent fluid came out. She has felt improved since then. MEDICATIONS   Scheduled Meds:   ciprofloxacin  400 mg IntraVENous Once    sodium chloride flush  5-40 mL IntraVENous 2 times per day    famotidine (PEPCID) injection  20 mg IntraVENous BID    ciprofloxacin  400 mg IntraVENous Q12H    metroNIDAZOLE  500 mg IntraVENous Q8H     Continuous Infusions:   sodium chloride 100 mL/hr at 11/07/21 7543    sodium chloride       PRN Meds:.sodium chloride flush, sodium chloride, [DISCONTINUED] ondansetron **OR** ondansetron, morphine **OR** morphine  OBJECTIVE   CURRENT VITALS:  height is 4' 11\" (1.499 m) and weight is 84 lb (38.1 kg). Her oral temperature is 98.1 °F (36.7 °C). Her blood pressure is 94/57 (abnormal) and her pulse is 88.  Her respiration is 16 and oxygen saturation is 100%. Temperature Range (24h):Temp: 98.1 °F (36.7 °C) Temp  Av.2 °F (36.8 °C)  Min: 97.5 °F (36.4 °C)  Max: 99.2 °F (37.3 °C)  BP Range (08V): Systolic (53BZP), HRL:78 , Min:93 , APOORVA:110     Diastolic (94IAC), FFK:89, Min:56, Max:59    Pulse Range (24h): Pulse  Av.2  Min: 83  Max: 102  Respiration Range (24h): Resp  Avg: 15.7  Min: 14  Max: 16  Current Pulse Ox (24h):  SpO2: 100 %  Pulse Ox Range (24h):  SpO2  Av.2 %  Min: 98 %  Max: 100 %  Oxygen Amount and Delivery:    Incentive Spirometry Tx:            GENERAL: alert, no distress, smiling  LUNGS: clear to ausculation, without wheezes, rales or rhonci  HEART: normal rate, regular rhythm, normal S1 and S2, no murmurs, rubs, clicks or gallops, distal pulses intact, no carotid bruits  ABDOMEN: non-distended, tenderness present-over right lower quadrant overlying induration, small 0.5 cm wound without active drainage, bowel sounds present in all 4 quadrants and no guarding or peritoneal signs  EXTREMITY: no cyanosis, clubbing or edema    In: 3057.1 [P.O.:320; I.V.:2637.1]  Out: -      Date 21 0000 - 21   Shift 4703-5393 4941-8905 3534-7624 24 Hour Total   INTAKE   P.O. 320   320   I. V.(mL/kg/hr) 567.6(1.9)   567.6   IV Piggyback 100   100   Shift Total(mL/kg) 987.6(25.9)   987.6(25.9)   OUTPUT   Shift Total(mL/kg)       Weight (kg) 38.1 38.1 38.1 38.1     LABS     Recent Labs     21  1906 21  1906 21  0444 21  0918 21  0536   WBC 17.5*   < > 18.0* 18.4* 10.2   HGB 10.0*   < > 9.2* 9.0* 7.4*   HCT 32.3*   < > 29.9* 28.8* 24.4*   *   < > 445* 413* 392     --  138  --   --    K 3.7  --  3.7  --   --    CL 98  --  103  --   --    CO2 23  --  22*  --   --    BUN 4*  --  3*  --   --    CREATININE 0.5  --  0.5  --   --    CALCIUM 9.0  --  8.3*  --   --     < > = values in this interval not displayed. No results for input(s): PTT, INR in the last 72 hours.     Invalid

## 2021-11-08 ENCOUNTER — APPOINTMENT (OUTPATIENT)
Dept: CT IMAGING | Age: 31
DRG: 871 | End: 2021-11-08
Payer: COMMERCIAL

## 2021-11-08 PROCEDURE — 2500000003 HC RX 250 WO HCPCS: Performed by: PHYSICIAN ASSISTANT

## 2021-11-08 PROCEDURE — 87075 CULTR BACTERIA EXCEPT BLOOD: CPT

## 2021-11-08 PROCEDURE — 87077 CULTURE AEROBIC IDENTIFY: CPT

## 2021-11-08 PROCEDURE — 1200000000 HC SEMI PRIVATE

## 2021-11-08 PROCEDURE — 2580000003 HC RX 258: Performed by: SURGERY

## 2021-11-08 PROCEDURE — 87205 SMEAR GRAM STAIN: CPT

## 2021-11-08 PROCEDURE — 74150 CT ABDOMEN W/O CONTRAST: CPT

## 2021-11-08 PROCEDURE — 99232 SBSQ HOSP IP/OBS MODERATE 35: CPT | Performed by: SURGERY

## 2021-11-08 PROCEDURE — APPSS45 APP SPLIT SHARED TIME 31-45 MINUTES: Performed by: NURSE PRACTITIONER

## 2021-11-08 PROCEDURE — 87070 CULTURE OTHR SPECIMN AEROBIC: CPT

## 2021-11-08 PROCEDURE — 6370000000 HC RX 637 (ALT 250 FOR IP): Performed by: PHYSICIAN ASSISTANT

## 2021-11-08 PROCEDURE — 6360000002 HC RX W HCPCS: Performed by: PHYSICIAN ASSISTANT

## 2021-11-08 RX ORDER — ACETAMINOPHEN 325 MG/1
650 TABLET ORAL EVERY 6 HOURS PRN
Status: DISCONTINUED | OUTPATIENT
Start: 2021-11-08 | End: 2021-11-09 | Stop reason: HOSPADM

## 2021-11-08 RX ADMIN — METRONIDAZOLE 500 MG: 500 INJECTION, SOLUTION INTRAVENOUS at 15:24

## 2021-11-08 RX ADMIN — SODIUM CHLORIDE: 9 INJECTION, SOLUTION INTRAVENOUS at 15:22

## 2021-11-08 RX ADMIN — FAMOTIDINE 20 MG: 10 INJECTION, SOLUTION INTRAVENOUS at 09:15

## 2021-11-08 RX ADMIN — METRONIDAZOLE 500 MG: 500 INJECTION, SOLUTION INTRAVENOUS at 04:33

## 2021-11-08 RX ADMIN — METRONIDAZOLE 500 MG: 500 INJECTION, SOLUTION INTRAVENOUS at 21:32

## 2021-11-08 RX ADMIN — CIPROFLOXACIN 400 MG: 2 INJECTION, SOLUTION INTRAVENOUS at 12:51

## 2021-11-08 RX ADMIN — MORPHINE SULFATE 2 MG: 2 INJECTION, SOLUTION INTRAMUSCULAR; INTRAVENOUS at 09:15

## 2021-11-08 RX ADMIN — FAMOTIDINE 20 MG: 10 INJECTION, SOLUTION INTRAVENOUS at 20:29

## 2021-11-08 RX ADMIN — ACETAMINOPHEN 650 MG: 325 TABLET ORAL at 21:44

## 2021-11-08 ASSESSMENT — PAIN DESCRIPTION - ONSET
ONSET: ON-GOING
ONSET: ON-GOING

## 2021-11-08 ASSESSMENT — PAIN DESCRIPTION - FREQUENCY
FREQUENCY: CONTINUOUS
FREQUENCY: CONTINUOUS

## 2021-11-08 ASSESSMENT — PAIN SCALES - GENERAL
PAINLEVEL_OUTOF10: 0
PAINLEVEL_OUTOF10: 2
PAINLEVEL_OUTOF10: 6
PAINLEVEL_OUTOF10: 5
PAINLEVEL_OUTOF10: 0
PAINLEVEL_OUTOF10: 8

## 2021-11-08 ASSESSMENT — PAIN DESCRIPTION - ORIENTATION
ORIENTATION: RIGHT;LOWER
ORIENTATION: RIGHT;LOWER

## 2021-11-08 ASSESSMENT — PAIN DESCRIPTION - DESCRIPTORS
DESCRIPTORS: ACHING
DESCRIPTORS: ACHING

## 2021-11-08 ASSESSMENT — PAIN DESCRIPTION - PAIN TYPE
TYPE: ACUTE PAIN
TYPE: ACUTE PAIN

## 2021-11-08 ASSESSMENT — PAIN DESCRIPTION - LOCATION
LOCATION: ABDOMEN
LOCATION: ABDOMEN

## 2021-11-08 NOTE — PROGRESS NOTES
1000 Pt arrived to CT holding. Skin natural for race, warm, dry. Respirations even and unlabored. Denies c/o pain at this time  200 Dr. Lizbeth Prince in to evaluate pt and explain procedure. Consent obtained. 1020 Pt positioned on CT table. Monitor applied. 1024 Pre scans complete. 1028 Procedure cancelled, fluid collection resolved. Dr Pk Torres updated pt. Gauze and tape to [facility] site  1040 Pt transported to  via bed. Report called to Tash Gibbs RN. Skin natural for race, warm, dry. Respirations even and unlabored.  No complaints voiced at this time

## 2021-11-08 NOTE — PROGRESS NOTES
Physician Progress Note      PATIENT:               Raphael Mcguire  CSN #:                  [id number]  :                       1990  ADMIT DATE:       2021 6:24 PM  [address] DATE:  RESPONDING  PROVIDER #:        Kyle Craig MD          QUERY TEXT:    Patient admitted with recurrent peritoneal abscess due to Crohn's disease. Noted documentation of sepsis in H & P. Please document if the diagnosis of   sepsis has been ruled in or ruled out after further study. The medical record reflects the following:  Risk Factors: Recurrent peritoneal abscess due to Crohn's disease  Clinical Indicators: lactic 0.6,procal 0.05, WBC 17.5,100.5, HR Tachy,   hypotension  Treatment: IVF, lab monitoring, possible drain placement, IV ATB's    Thank You! Roshan Joseph RN, CRCR  RN Clinical   (O) [phone] (N) [phone]  Options provided:  -- Sepsis poa ruled in  -- Sepsis was ruled out after study  -- Other - I will add my own diagnosis  -- Disagree - Not applicable / Not valid  -- Disagree - Clinically unable to determine / Unknown  -- Refer to Clinical Documentation Reviewer    PROVIDER RESPONSE TEXT:    Sepsis poa ruled in.     Query created by: Hailey Gan on 2021 6:25 AM      Electronically signed by:  Kyle Craig MD 2021 10:10 AM

## 2021-11-08 NOTE — CARE COORDINATION
11/8/21, 1:48 PM EST  DISCHARGE PLANNING EVALUATION:    Fuad Neff       Admitted: 11/5/2021/ [facility] day: 3   Location: -18/018-A Reason for admit: Septicemia [facility]) [A41.9]  Intra-abdominal abscess ([facility] [K65.1]  Crohn's disease with complication, unspecified gastrointestinal tract location [facility]) [K50.919]   PMH:  has a past medical history of Crohn's colitis ([facility] [address] Procedure: 11/08/2021 planned drain placement-not placed due to fluid absorbed  Barriers to Discharge:  Patient presents with abdominal wall pain and tenderness. She has a history of an intra-abdominal wall abscess. IV fluids, IV Cipro, Flagyl, and Pepcid, prn Morphine and Zofran, full liquid diet, SCD's, up as tolerated. PCP: Melissa Yoo MD  Readmission Risk Score: 11.8 ( )%    Patient Goals/Plan/Treatment Preferences: Met with [facility]. She resides at home with her . Zander Jacobs verifies her insurance and PCP. She is able to afford her medications and denies a need for DME. She will have transportation to home at discharge. Zander Jacobs is independent managing her health care needs. She declines HH. Transportation/Food Security/Housekeeping Addressed:  No issues identified.

## 2021-11-08 NOTE — H&P
ondansetron (ZOFRAN) injection 4 mg, 4 mg, IntraVENous, Q6H PRN, SARA Mckoy    morphine (PF) injection 2 mg, 2 mg, IntraVENous, Q2H PRN, 2 mg at 11/08/21 0915 **OR** morphine injection 4 mg, 4 mg, IntraVENous, Q2H PRN, SARA Cochran, 4 mg at 11/07/21 2327    famotidine (PEPCID) injection 20 mg, 20 mg, IntraVENous, BID, SARA Cochran, 20 mg at 11/08/21 0915    ciprofloxacin (CIPRO) IVPB 400 mg, 400 mg, IntraVENous, Q12H, SARA Cochran, Stopped at 11/08/21 0225    metronidazole (FLAGYL) 500 mg in NaCl 100 mL IVPB premix, 500 mg, IntraVENous, Q8H, SARA Cochran, Stopped at 11/08/21 0532  Prior to Admission medications    Medication Sig Start Date End Date Taking?  Authorizing Provider   [address] [place] [address] 0.25-35 MG-MCG per tablet Take 1 tablet by mouth daily 9/3/21   Historical Provider, MD   adalimumab (HUMIRA) 40 MG/0.8ML injection Inject into the skin    Historical Provider, MD   ondansetron (ZOFRAN) 4 MG tablet Take 1 tablet by mouth every 6 hours as needed for Nausea or Vomiting  Patient not taking: Reported on 10/18/2021 8/9/21   PRAVEENA Pacheco - CNP     Additional information:       VITAL SIGNS   Vitals:    11/08/21 0912   BP: (!) 95/55   Pulse: 72   Resp: 16   Temp: 97.3 °F (36.3 °C)   SpO2: 100%       PHYSICAL:   Heart:  [x]Regular rate and rhythm  []Other:    Lungs:  [x]Clear    []Other:    Abdomen: [x]Soft    []Other:    Mental Status: [x]Alert & Oriented  []Other:      PLANNED PROCEDURE   []Biospy []Arteriogram              [x]Drainage   []Mediport Insertion  []Fistulogram []IV access       []Vertebroplasty / Augmentation  []IVC filter []Dialysis catheter []Biliary drainage  []Other: []CAPD Catheter []Nephrostomy Tube / Stent  SEDATION  Planned agent:[x]Midazolam []Meperidine [x]Sublimaze []Dilaudid []Morphine     []Diazepam  []Other:     ASA Classification:  []1 [x]2 []3 []4 []5  Class 1: A normal healthy patient  Class 2: Pt with mild to moderate systemic disease  Class 3: Severe systemic disease or disturbance  Class 4: Severe systemic disorders that are already life threatening. Class 5: Moribund pt with little chances of survival, for more than 24 hours. Mallampati I Airway Classification:   []1 []2 [x]3 []4    [x]Pre-procedure diagnostic studies complete and results available. Comment:    [x]Previous sedation/anesthesia experiences assessed. Comment:    [x]The patient is an appropriate candidate to undergo the planned procedure sedation and anesthesia. (Refer to nursing sedation/analgesia documentation record)  [x]Formulation and discussion of sedation/procedure plan, risks, and expectations with patient and/or responsible adult completed. [x]Patient examined immediately prior to the procedure.  (Refer to nursing sedation/analgesia documentation record)    Barbie Rodriguez DO, DO, MD  Electronically signed 11/8/2021 at 10:18 AM

## 2021-11-08 NOTE — PROGRESS NOTES
[facility] - Dr. Osa [place]  Daily Progress Note    Pt Name: [address] Record Number: [id number]  Date of Birth 1990   Today's Date: 11/8/2021    Hospital day # 3     ASSESSMENT   1. Recurrent intra-abdominal abscess secondary to Crohn's disease  2. Sepsis POA   has a past medical history of Crohn's colitis ([address] PLAN   1. NPO for drain placement. Liquids after as tolerated. 2. IV antibiotics. Await cultures. 3. Up as tolerated  4. Pain & nausea control  5. Will touch base with GI as a courtesy. Had last Humira dose 2 weeks ago. 6. Labs reviewed and WBC normal yesterday. Platelets improved. 7. No acute surgical intervention. Abdomen pretty benign. Will attempt CT guided drain placement for cultures. --Addendum since this morning. Patient went down for drain placement and fluid collections seemed to have spontaneously drained on it's own. Procedure aborted. Wound culture obtained at beside. SUBJECTIVE   Chief complaint: Right sided abdominal tenderness around old JAYMIE site    Patient was stable overnight. Chart reviewed. Updated by nursing staff. Up in room by herself. Denies chest discomfort or dyspnea. No N/V; (+) belching, flatus and having BMs. NPO right now. Waiting on CT guided drain placement. No generalized abdominal pain. Soreness around old JAYMIE site. Draining some purulent fluid today. No urinary complaints. No fevers.    CURRENT MEDICATIONS   Scheduled Meds:   ciprofloxacin  400 mg IntraVENous Once    sodium chloride flush  5-40 mL IntraVENous 2 times per day    famotidine (PEPCID) injection  20 mg IntraVENous BID    ciprofloxacin  400 mg IntraVENous Q12H    metroNIDAZOLE  500 mg IntraVENous Q8H     Continuous Infusions:   sodium chloride 100 mL/hr at 11/07/21 2327    sodium chloride       PRN Meds:.sodium chloride flush, sodium chloride, [DISCONTINUED] ondansetron **OR** ondansetron, morphine **OR** morphine  OBJECTIVE   CURRENT VITALS:  height is 4' 11\" (1.499 m) and weight is 84 lb (38.1 kg). Her oral temperature is 97.9 °F (36.6 °C). Her blood pressure is 92/50 (abnormal) and her pulse is 78. Her respiration is 16 and oxygen saturation is 98%. Temperature Range (24h):Temp: 97.9 °F (36.6 °C) Temp  Av °F (36.7 °C)  Min: 97.9 °F (36.6 °C)  Max: 98.1 °F (36.7 °C)  BP Range (61E): Systolic (99HLI), IIS:54 , Min:92 , JPH:663     Diastolic (54YFJ), CX, Min:50, Max:60    Pulse Range (24h): Pulse  Av.4  Min: 75  Max: 93  Respiration Range (24h): Resp  Av  Min: 16  Max: 16  Current Pulse Ox (24h):  SpO2: 98 %  Pulse Ox Range (24h):  SpO2  Av %  Min: 98 %  Max: 100 %  Oxygen Amount and Delivery:    Incentive Spirometry Tx:            GENERAL: alert, cooperative, no distress  SKIN: Skin color, texture, turgor normal.   HEENT: Head is normocephalic, atraumatic. NECK: Supple, symmetrical, trachea midline  LUNGS: clear to ausculation, without wheezes, rales or rhonci  HEART: normal rate and regular rhythm  ABDOMEN: soft, mild tenderness to right lower quadrant around old JAYMIE drain site - minimal erythema and small amount of drainage, non-distended, bowel sounds present  NEUROLOGIC: There are no focalizing motor or sensory deficits. EXTREMITIES: no cyanosis, no clubbing and no edema.   In: 2273.7 [P.O.:340; I.V.:1933.7]  Out: -   Date 21 0000 - 21 8440   Shift  1030-4169 6798-4493 24 Hour Total   INTAKE   I.V.(mL/kg/hr) 636   636   Shift Total(mL/kg) 636(16.7)   636(16.7)   OUTPUT   Shift Total(mL/kg)       Weight (kg) 38.1 38.1 38.1 38.1     LABS     Recent Labs     21  19021  1906 21  0444 21  0918 21  0536   WBC 17.5*   < > 18.0* 18.4* 10.2   HGB 10.0*   < > 9.2* 9.0* 7.4*   HCT 32.3*   < > 29.9* 28.8* 24.4*   *   < > 445* 413* 392     --  138  --   --    K 3.7  --  3.7  --   --    CL 98  --  103  --   --    CO2 23  --  22*  --   --    BUN 4*  --  3*  -- --    CREATININE 0.5  --  0.5  --   --    CALCIUM 9.0  --  8.3*  --   --     < > = values in this interval not displayed. No results for input(s): PTT, INR in the last 72 hours. Invalid input(s): PT  Recent Labs     11/05/21  1906 11/06/21  0444   AST 15 11   ALT 10* 12   BILITOT 0.8 0.6     RADIOLOGY     PROCEDURE: CT ABDOMEN WO CONTRAST       CLINICAL INFORMATION: Peritoneal abscess drain placement . History of Crohn's disease.       COMPARISON: CT abdomen pelvis dated 11/5/2021.       TECHNIQUE: Risks and benefits of the procedure were thoroughly explained and oral and written informed consent obtained. The patient was placed supine on the CT table with guide matrix placed 4 puncture site marking. Initial CT for marking guidance was    obtained. The procedure was then terminated.       All CT scans at this facility use dose modulation, iterative reconstruction, and/or weight-based dosing when appropriate to reduce radiation dose to as low as reasonably achievable.       FINDINGS:    Initial CT images show the anterior abdominal abscesses seen on previous exam 2 have resolved in the interval without targetable lesion. Heterogeneous phlegmon/developing abscess in the right hemipelvis appears to have decreased in size as well although    difficult to compare to the previous contrast enhanced exam.           Impression    Aborted CT-guided drain placement secondary to interval spontaneous drainage to the skin surface.                   **This report has been created using voice recognition software. It may contain minor errors which are inherent in voice recognition technology. **       Final report electronically signed by Dr. Shayy Maxwell MD on 11/8/2021 1:57 PM     Electronically signed by PRAVEENA Chavis CNP on 11/8/2021 at 7:16 AM     Patient seen and examined independently by me earlier this AM. Above discussed and I agree with Dereck Pulliam CNP.  See my additional comments below for

## 2021-11-09 VITALS
DIASTOLIC BLOOD PRESSURE: 63 MMHG | OXYGEN SATURATION: 100 % | HEIGHT: 59 IN | RESPIRATION RATE: 16 BRPM | TEMPERATURE: 97.5 F | HEART RATE: 66 BPM | BODY MASS INDEX: 16.93 KG/M2 | WEIGHT: 84 LBS | SYSTOLIC BLOOD PRESSURE: 98 MMHG

## 2021-11-09 PROCEDURE — 6360000002 HC RX W HCPCS: Performed by: PHYSICIAN ASSISTANT

## 2021-11-09 PROCEDURE — 2500000003 HC RX 250 WO HCPCS: Performed by: PHYSICIAN ASSISTANT

## 2021-11-09 PROCEDURE — 99232 SBSQ HOSP IP/OBS MODERATE 35: CPT | Performed by: SURGERY

## 2021-11-09 PROCEDURE — 2580000003 HC RX 258: Performed by: SURGERY

## 2021-11-09 PROCEDURE — 6370000000 HC RX 637 (ALT 250 FOR IP): Performed by: PHYSICIAN ASSISTANT

## 2021-11-09 RX ORDER — LEVOFLOXACIN 750 MG/1
750 TABLET ORAL DAILY
Qty: 5 TABLET | Refills: 0 | Status: SHIPPED | OUTPATIENT
Start: 2021-11-09 | End: 2021-11-14

## 2021-11-09 RX ORDER — METRONIDAZOLE 500 MG/1
500 TABLET ORAL 3 TIMES DAILY
Qty: 15 TABLET | Refills: 0 | Status: SHIPPED | OUTPATIENT
Start: 2021-11-09 | End: 2021-11-14

## 2021-11-09 RX ADMIN — METRONIDAZOLE 500 MG: 500 INJECTION, SOLUTION INTRAVENOUS at 04:39

## 2021-11-09 RX ADMIN — ACETAMINOPHEN 650 MG: 325 TABLET ORAL at 06:19

## 2021-11-09 RX ADMIN — SODIUM CHLORIDE: 9 INJECTION, SOLUTION INTRAVENOUS at 04:36

## 2021-11-09 RX ADMIN — CIPROFLOXACIN 400 MG: 2 INJECTION, SOLUTION INTRAVENOUS at 11:14

## 2021-11-09 RX ADMIN — FAMOTIDINE 20 MG: 10 INJECTION, SOLUTION INTRAVENOUS at 08:19

## 2021-11-09 RX ADMIN — CIPROFLOXACIN 400 MG: 2 INJECTION, SOLUTION INTRAVENOUS at 00:37

## 2021-11-09 RX ADMIN — METRONIDAZOLE 500 MG: 500 INJECTION, SOLUTION INTRAVENOUS at 13:39

## 2021-11-09 ASSESSMENT — PAIN SCALES - GENERAL
PAINLEVEL_OUTOF10: 4
PAINLEVEL_OUTOF10: 4
PAINLEVEL_OUTOF10: 0
PAINLEVEL_OUTOF10: 2

## 2021-11-09 NOTE — CARE COORDINATION
Patient is discharged to home today with no services added/requested. 11/9/21, 2:37 PM EST    Patient goals/plan/ treatment preferences discussed by  and . Patient goals/plan/ treatment preferences reviewed with patient/ family. Patient/ family verbalize understanding of discharge plan and are in agreement with goal/plan/treatment preferences. Understanding was demonstrated using the teach back method. AVS provided by RN at time of discharge, which includes all necessary medical information pertaining to the patients current course of illness, treatment, post-discharge goals of care, and treatment preferences.

## 2021-11-09 NOTE — PROGRESS NOTES
Edward Costello Failing  Daily Progress Note    Pt Name: [address] Record Number: [id number]  Date of Birth 1990   Today's Date: 11/9/2021    Hospital day # 4     ASSESSMENT   1. Recurrent intra-abdominal abscess secondary to Crohn's disease  2. Sepsis POA - resolved    has a past medical history of Crohn's colitis ([address] PLAN   1. Regular diet as tolerated  2. IV antibiotics. Await cultures. No growth prelim. 3. Up as tolerated  4. Pain & nausea control  5. Discussed case with GI yesterday. No changes from their standpoint. Continue humira and follow up as scheduled. 6. Ct imaging yesterday with no noted fluid collection. Collection spontaneously drained on it's own. 7. Drainage from wound has much improved. Clinically, looks good. Hopefully home later today if does well with lunch. Planning repeat imaging outpatient as needed. Follow up in 1 week. SUBJECTIVE   Chief complaint: Right sided abdominal tenderness around old JAYMIE site    Patient was stable overnight. Chart reviewed. Updated by nursing staff. Up in room by herself. Denies chest discomfort or dyspnea. No N/V; (+) belching, flatus and having BMs. Tolerating regular diet so far. No generalized abdominal pain. Soreness around old JAYMIE site. Minimal drainage today. No urinary complaints. No fevers.    CURRENT MEDICATIONS   Scheduled Meds:   ciprofloxacin  400 mg IntraVENous Once    sodium chloride flush  5-40 mL IntraVENous 2 times per day    famotidine (PEPCID) injection  20 mg IntraVENous BID    ciprofloxacin  400 mg IntraVENous Q12H    metroNIDAZOLE  500 mg IntraVENous Q8H     Continuous Infusions:   sodium chloride 100 mL/hr at 11/09/21 0732    sodium chloride       PRN Meds:.acetaminophen, sodium chloride flush, sodium chloride, [DISCONTINUED] ondansetron **OR** ondansetron, morphine **OR** morphine  OBJECTIVE   CURRENT VITALS:  height is 4' 11\" (1.499 m) and weight is 84 lb (38.1 kg). Her oral temperature is 97.5 °F (36.4 °C). Her blood pressure is 98/63 and her pulse is 66. Her respiration is 16 and oxygen saturation is 100%. Temperature Range (24h):Temp: 97.5 °F (36.4 °C) Temp  Av.9 °F (36.6 °C)  Min: 97.5 °F (36.4 °C)  Max: 98.2 °F (36.8 °C)  BP Range (99A): Systolic (63KFV), SVU:447 , Min:98 , DBM:803     Diastolic (94IPV), UMZ:83, Min:61, Max:63    Pulse Range (24h): Pulse  Av.3  Min: 66  Max: 81  Respiration Range (24h): Resp  Av  Min: 16  Max: 16  Current Pulse Ox (24h):  SpO2: 100 %  Pulse Ox Range (24h):  SpO2  Av.7 %  Min: 99 %  Max: 100 %  Oxygen Amount and Delivery:    Incentive Spirometry Tx:            GENERAL: alert, cooperative, no distress  SKIN: Skin color, texture, turgor normal.   HEENT: Head is normocephalic, atraumatic. NECK: Supple, symmetrical, trachea midline  LUNGS: clear to ausculation, without wheezes, rales or rhonci  HEART: normal rate and regular rhythm  ABDOMEN: soft, mild tenderness to right lower quadrant around old JAYMIE drain site - minimal erythema and small amount of drainage, non-distended, bowel sounds present  NEUROLOGIC: There are no focalizing motor or sensory deficits. EXTREMITIES: no cyanosis, no clubbing and no edema. In: 7113.8 [P.O.:240; I.V.:4563.5]  Out: -   Date 21 0000 - 21   Shift 9464-8422 7136-5085 9786-2359 24 Hour Total   INTAKE   I.V.(mL/kg/hr) 4448. 5(14.6)   4448.5   IV Piggyback 2310.2   2310.2   Shift Total(mL/kg) 9587.5(069.0)   4500.0(037.4)   OUTPUT   Shift Total(mL/kg)       Weight (kg) 38.1 38.1 38.1 38.1     LABS     Recent Labs     21  0536   WBC 10.2   HGB 7.4*   HCT 24.4*         RADIOLOGY     PROCEDURE: CT ABDOMEN WO CONTRAST       CLINICAL INFORMATION: Peritoneal abscess drain placement .  History of Crohn's disease.       COMPARISON: CT abdomen pelvis dated 2021.       TECHNIQUE: Risks and benefits of the procedure were thoroughly explained and oral and written Ana Laura Fisher MD on 11/9/21 at 5:23 PM EST

## 2021-11-09 NOTE — PROGRESS NOTES
Discharge instructions explained to patient with verbalized understanding. Patient denied questions. Patient returning home with all personal belongings.

## 2021-11-11 LAB — BLOOD CULTURE, ROUTINE: NORMAL

## 2021-11-11 NOTE — DISCHARGE SUMMARY
[facility]  Dr. Molina Sparks [address] [facility]       Pt Name: Tiffanie Worthington  MRN: [id number]  YOB: 1990  Primary Care Physician: Bethanie Burnette MD    Admit date:  11/5/2021  6:24 PM      Discharge date:  11/9/2021  3:20 PM    Admitting Diagnosis:   1. Intra-abdominal abscess ([address]    2. Crohn's disease with complication, unspecified gastrointestinal tract location ([address]    3. Septicemia [facility])      Discharge Diagnosis:   1. Intra-abdominal abscess ([address]    2. Crohn's disease with complication, unspecified gastrointestinal tract location ([address]    3. Septicemia [facility])      Admitting Service: General Surgery, Ilda Rodriguez MD.    Consultants:  None    History and Physical:  General Surgery History and Physical  Harlan Juarez DO   Covering for Dr. Christiano Parker Name: Tiffanie Worthington  MRN: [id number]  YOB: 1990  Date of evaluation: 11/6/2021  Primary Care Physician: Bethanie Burnette MD  Patient evaluated at the request of  Dr. Laverne Barragan  Reason for evaluation: peritoneal abscess  IMPRESSIONS:   1. Recurrent peritoneal abscess due to Crohn's disease  2. Last Humera therapy 2 weeks ago  3. SIRS   4. does not have any pertinent problems on file. PLANS:   1. Admit type: Inpatient  2. It is expected this patient's LOS will be: Greater than 2 midnights  3. Anticipated Disposition Upon Discharge: Home  4. Analgesics and antiemetics on a prn basis  5. IV hydration  6. Empiric antibiotic coverage  7. IR for perc drain placement  8. NPO except ice chips, sips of H2O  9. Fluid bolus  10. Sepsis - lactic acid, cultures  11. Dr. Miguel Angel Ramos to resume care 11/7  SUBJECTIVE:   History of Chief Complaint:    Oneal Scruggs is a 36 y. o.female who presents with abdominal pain. The pain is described as sharp, and is moderate to severe in intensity. Pain is located in the RUQ, RLQ with radiation to the right back. Onset was 2 days ago.  Symptoms have been gradually worsening since. Aggravating factors include activity. Alleviating factors include pain medications to some degree. She denies chills, fever, nausea and vomiting. She was admitted in 2021 for peritoneal abscess, underwent open drainage and drain placements. The right sided drain had drainage follow its removal, last reported drainage 10/6/21. She is on [place] per GI with the last dose 2 weeks ago. Past Medical History   has a past medical history of Crohn's colitis ([facility] [address] Past Surgical History   has a past surgical history that includes Transfer tooth extraction; pr  delivery only (N/A, 10/12/2018); CT ABSCESS DRAINAGE SOFT TISSUE (2021); CT ABSCESS DRAINAGE SOFT TISSUE (2021); laparotomy (N/A, 2021); and other surgical history (2021). Medications  Home Medications           Prior to Admission medications    Medication Sig Start Date End Date Taking? Authorizing Provider   [address] [place] [address] 0.25-35 MG-MCG per tablet Take 1 tablet by mouth daily 9/3/21     Historical Provider, MD   adalimumab (HUMIRA) 40 MG/0.8ML injection Inject into the skin       Historical Provider, MD   ondansetron (ZOFRAN) 4 MG tablet Take 1 tablet by mouth every 6 hours as needed for Nausea or Vomiting  Patient not taking: Reported on 10/18/2021 8/9/21     Sarai Rushing, APRN - CNP       Scheduled Meds:  Scheduled Medications    sodium chloride  500 mL IntraVENous Once    ciprofloxacin  400 mg IntraVENous Once    sodium chloride flush  5-40 mL IntraVENous 2 times per day    famotidine (PEPCID) injection  20 mg IntraVENous BID    ciprofloxacin  400 mg IntraVENous Q12H    metroNIDAZOLE  500 mg IntraVENous Q8H         Continuous Infusions:  Infusions Meds    sodium chloride 100 mL/hr at 21 2307    sodium chloride           PRN Meds:.   PRN Medications   sodium chloride flush, sodium chloride, [DISCONTINUED] ondansetron **OR** ondansetron, morphine **OR** morphine     Allergies  is allergic to duricef [cefadroxil] and penicillins. Family History  family history includes Crohn's Disease in her mother; [place] Neat Disease in her sister; Heart Disease in her father; Other in her mother; Rheum Arthritis in her sister. Social History   reports that she has never smoked. She has never used smokeless tobacco. She reports that she does not drink alcohol and does not use drugs. Review of Systems:  General Denies any fever or chills  HEENT Denies any diplopia, tinnitus or vertigo  Resp Denies any shortness of breath, cough or wheezing  Cardiac Denies any chest pain, palpitations, claudication or edema  GI Denies any melena, hematochezia, hematemesis or pyrosis   Denies any frequency, urgency, hesitancy or incontinence  Heme Denies bruising or bleeding easily  Endocrine Denies any history of diabetes or thyroid disease  Neuro Denies any focal motor or sensory deficits  OBJECTIVE:   CURRENT VITALS:  height is 4' 11\" (1.499 m) and weight is 84 lb (38.1 kg). Her oral temperature is 100.7 °F (38.2 °C). Her blood pressure is 104/56 (abnormal) and her pulse is 103. Her respiration is 18 and oxygen saturation is 100%. Body mass index is 16.97 kg/m². Temperature Range (24h):Temp: 100.7 °F (38.2 °C) Temp  Av.6 °F (37.6 °C)  Min: 98.2 °F (36.8 °C)  Max: 100.7 °F (38.2 °C)  BP Range (29M): Systolic (72OSJ), MFC:467 , Min:99 , ZEQ:934     Diastolic (06OKL), APD:43, Min:56, Max:71     Pulse Range (24h): Pulse  Av.8  Min: 98  Max: 130  Respiration Range (24h): Resp  Av  Min: 16  Max: 20  Current Pulse Ox (24h):  SpO2: 100 %  Pulse Ox Range (24h):  SpO2  Av.5 %  Min: 98 %  Max: 100 %  Oxygen Amount and Delivery:    CONSTITUTIONAL: Alert and oriented times 3, no acute distress and cooperative to examination with proper mood and affect. SKIN: Skin color, texture, turgor normal. No rashes or lesions. LYMPH: no cervical nodes, no inguinal nodes  HEENT: Head is normocephalic, atraumatic. EOMI, PERRLA.   NECK: Supple, symmetrical, trachea midline, no adenopathy, thyroid symmetric, not enlarged and no tenderness, skin normal.  CHEST/LUNGS: chest symmetric with normal A/P diameter, normal respiratory rate and rhythm, lungs clear to auscultation without wheezes, rales or rhonchi. No accessory muscle use. Scars None   CARDIOVASCULAR: Heart sounds are normal.  Regular rate and rhythm without murmur, gallop or rub. Normal S1 and S2. Carotid and femoral pulses 2+/4 and equal bilaterally. ABDOMEN: mildly distended large midline scar scar(s) present. Normal bowel sounds. No bruits. Firm on the right side, no masses or organomegaly. no evidence of hernia. Percussion: Normal without hepatosplenomegally. Tenderness: Diffuse, worse on the right side. RECTAL: deferred, not clinically indicated  NEUROLOGIC: There are no focalizing motor or sensory deficits. CN II-XII are grossly intact. [place] EXTREMITIES: no cyanosis, no clubbing and no edema. LABS:            Recent Labs     11/05/21  1906 11/05/21  2320 11/06/21  0444   WBC 17.5*  --  18.0*   HGB 10.0*  --  9.2*   HCT 32.3*  --  29.9*   *  --  445*     --  138   K 3.7  --  3.7   CL 98  --  103   CO2 23  --  22*   BUN 4*  --  3*   CREATININE 0.5  --  0.5   CALCIUM 9.0  --  8.3*   AST 15  --  11   ALT 10*  --  12   BILITOT 0.8  --  0.6   NITRU  --  NEGATIVE  --    COLORU  --  YELLOW  --    BACTERIA  --  NONE SEEN  --       RADIOLOGY:   I have personally reviewed the following films:  CT ABDOMEN PELVIS W IV CONTRAST Additional Contrast? None   Final Result   1. Multiple fluid collections in the right abdomen suspicious for abscesses.    2. Mural thickening, adjacent inflammatory stranding and ascites in multiple bowel loops, likely secondary to active inflammatory bowel disease.       Final report electronically signed by Dr. Beto Campbell on 11/5/2021 8:35 PM       IR Interventional Radiology Procedure Request    (Results Pending)         Thank you for the interesting and increased redness to abdominal wall. CT imaging demonstrated rlq fluid collection. She was admitted for IV antibiotics, pain control, and IV hydration. After the weekend she was taken to radiology to attempt to drain collection but area had already spontaneously drained and no fluid present during procedure. Wound culture pending but clinically patient doing and feeling better. GI discussed continuing Humira at discharge and follow up as already directed.     Discharge Condition: stable    Disposition: home    Labs:  Lab Results   Component Value Date    WBC 10.2 11/07/2021    HGB 7.4 (L) 11/07/2021    HCT 24.4 (L) 11/07/2021    MCV 86.5 11/07/2021     11/07/2021     Lab Results   Component Value Date     11/06/2021    K 3.7 11/06/2021     11/06/2021    CO2 22 11/06/2021    BUN 3 11/06/2021    CREATININE 0.5 11/06/2021    GLUCOSE 100 11/06/2021    GLUCOSE 108 11/08/2019    CALCIUM 8.3 11/06/2021      Discharge Medications:        Medication List      START taking these medications    levoFLOXacin 750 MG tablet  Commonly known as: LEVAQUIN  Take 1 tablet by mouth daily for 5 days     metroNIDAZOLE 500 MG tablet  Commonly known as: Flagyl  Take 1 tablet by mouth 3 times daily for 5 days        CONTINUE taking these medications    Humira 40 MG/0.8ML injection  Generic drug: adalimumab     ondansetron 4 MG tablet  Commonly known as: Zofran  Take 1 tablet by mouth every 6 hours as needed for Nausea or Vomiting     Sprintec 28 0.25-35 MG-MCG per tablet  Generic drug: norgestimate-ethinyl estradiol           Where to Get Your Medications      These medications were sent to [address] [place] [address]  [address] [facility] [address]    Phone: [phone]   · levoFLOXacin 750 MG tablet  · metroNIDAZOLE 500 MG tablet         Diet: General/Regular diet as tolerated    Activity: activity as tolerated    Wound Care: as directed     Follow-up:  in the next few weeks with Tyrone Spence MD  Follow up: in 1 week with PRAVEENA Noriega - CNP, CNP   Electronincally signed 11/11/2021 at 1:47 PM    A total of 35 minutes was spent in preparing the patient for discharge with greater than 50% of the time involved with education, counseling and coordinating care

## 2021-11-12 LAB
AEROBIC CULTURE: ABNORMAL
AEROBIC CULTURE: ABNORMAL
ANAEROBIC CULTURE: ABNORMAL
BLOOD CULTURE, ROUTINE: NORMAL
GRAM STAIN RESULT: ABNORMAL
ORGANISM: ABNORMAL

## 2022-07-18 NOTE — FLOWSHEET NOTE
small Ventralex patch texted dr Juan M Aguirre; returned text with a phone call. Notified of pt admit and evaluation. Concerned about sinusoidal fetal  heart rate pattern. Vag exam, 1+/70/-1, very anterior, bulge to membranes. Coming over to talk with pt.

## 2023-04-17 ENCOUNTER — NURSE ONLY (OUTPATIENT)
Dept: LAB | Age: 33
End: 2023-04-17

## 2023-04-30 LAB — CYTOLOGY THIN PREP PAP: NORMAL

## 2024-04-18 ENCOUNTER — NURSE ONLY (OUTPATIENT)
Dept: LAB | Age: 34
End: 2024-04-18

## 2024-04-30 LAB — CYTOLOGY THIN PREP PAP: NORMAL

## 2025-04-21 ENCOUNTER — LAB (OUTPATIENT)
Dept: LAB | Age: 35
End: 2025-04-21

## 2025-04-29 LAB — CYTOLOGY THIN PREP PAP: NORMAL

## (undated) DEVICE — SPONGE LAP W18XL18IN WHT COT 4 PLY FLD STRUNG RADPQ DISP ST

## (undated) DEVICE — GLOVE ORANGE PI 8   MSG9080

## (undated) DEVICE — SUTURE ABSORBABLE MONOFILAMENT 0 CTX 60 IN VIO PDS + PDP990G

## (undated) DEVICE — SPONGE GZ W4XL4IN COT 12 PLY TYP VII WVN C FLD DSGN

## (undated) DEVICE — SUTURE VCRL SZ 3-0 L18IN ABSRB VLT L26MM SH 1/2 CIR J774D

## (undated) DEVICE — PENCIL SMK EVAC ALL IN 1 DSGN ENH VISIBILITY IMPROVED AIR

## (undated) DEVICE — TTL1LYR 16FR10ML 100%SIL TMPST TR: Brand: MEDLINE

## (undated) DEVICE — SUTURE VCRL + SZ 0 L18IN ABSRB TIE VCP106G

## (undated) DEVICE — GLOVE ORANGE PI 7   MSG9070

## (undated) DEVICE — YANKAUER,POOLE TIP,STERILE,50/CS: Brand: MEDLINE

## (undated) DEVICE — BREAST HERNIA PACK: Brand: MEDLINE INDUSTRIES, INC.